# Patient Record
Sex: MALE | Race: BLACK OR AFRICAN AMERICAN | NOT HISPANIC OR LATINO | Employment: UNEMPLOYED | URBAN - METROPOLITAN AREA
[De-identification: names, ages, dates, MRNs, and addresses within clinical notes are randomized per-mention and may not be internally consistent; named-entity substitution may affect disease eponyms.]

---

## 2018-01-15 ENCOUNTER — HISTORICAL (OUTPATIENT)
Dept: ADMINISTRATIVE | Facility: HOSPITAL | Age: 38
End: 2018-01-15

## 2018-01-30 ENCOUNTER — HISTORICAL (OUTPATIENT)
Dept: RADIOLOGY | Facility: HOSPITAL | Age: 38
End: 2018-01-30

## 2018-05-04 ENCOUNTER — HISTORICAL (OUTPATIENT)
Dept: RADIOLOGY | Facility: HOSPITAL | Age: 38
End: 2018-05-04

## 2018-05-04 LAB — CORTIS SERPL-SCNC: 19 MCG/DL

## 2018-06-04 ENCOUNTER — HISTORICAL (OUTPATIENT)
Dept: RADIOLOGY | Facility: HOSPITAL | Age: 38
End: 2018-06-04

## 2018-06-18 ENCOUNTER — HISTORICAL (OUTPATIENT)
Dept: LAB | Facility: HOSPITAL | Age: 38
End: 2018-06-18

## 2018-06-18 LAB
BUN SERPL-MCNC: 9.6 MG/DL (ref 7–18)
CALCIUM SERPL-MCNC: 9.2 MG/DL (ref 8.5–10.1)
CHLORIDE SERPL-SCNC: 105 MMOL/L (ref 98–107)
CO2 SERPL-SCNC: 32.9 MMOL/L (ref 21–32)
CREAT SERPL-MCNC: 0.94 MG/DL (ref 0.6–1.3)
CREAT/UREA NIT SERPL: 10
GLUCOSE SERPL-MCNC: 73 MG/DL (ref 74–106)
POTASSIUM SERPL-SCNC: 4.2 MMOL/L (ref 3.5–5.1)
SODIUM SERPL-SCNC: 144 MMOL/L (ref 136–145)

## 2019-03-25 ENCOUNTER — HISTORICAL (OUTPATIENT)
Dept: LAB | Facility: HOSPITAL | Age: 39
End: 2019-03-25

## 2019-03-25 LAB
ABS NEUT (OLG): 4.48 X10(3)/MCL (ref 2.1–9.2)
ALBUMIN SERPL-MCNC: 4.4 GM/DL (ref 3.4–5)
ALBUMIN/GLOB SERPL: 1.3 RATIO (ref 1.1–2)
ALP SERPL-CCNC: 68 UNIT/L (ref 46–116)
ALT SERPL-CCNC: 31 UNIT/L (ref 12–78)
AST SERPL-CCNC: 11 UNIT/L (ref 15–37)
BASOPHILS # BLD AUTO: 0 X10(3)/MCL (ref 0–0.2)
BASOPHILS NFR BLD AUTO: 0 %
BILIRUB SERPL-MCNC: 0.8 MG/DL (ref 0.2–1)
BILIRUBIN DIRECT+TOT PNL SERPL-MCNC: 0.19 MG/DL (ref 0–0.2)
BILIRUBIN DIRECT+TOT PNL SERPL-MCNC: 0.61 MG/DL (ref 0–0.8)
BUN SERPL-MCNC: 22 MG/DL (ref 7–18)
CALCIUM SERPL-MCNC: 9.3 MG/DL (ref 8.5–10.1)
CHLORIDE SERPL-SCNC: 100 MMOL/L (ref 98–107)
CHOLEST SERPL-MCNC: 211 MG/DL (ref 0–200)
CHOLEST/HDLC SERPL: 5.1 {RATIO} (ref 0–5)
CO2 SERPL-SCNC: 32.1 MMOL/L (ref 21–32)
CREAT SERPL-MCNC: 0.94 MG/DL (ref 0.6–1.3)
EOSINOPHIL # BLD AUTO: 1 X10(3)/MCL (ref 0–0.9)
EOSINOPHIL NFR BLD AUTO: 11 %
ERYTHROCYTE [DISTWIDTH] IN BLOOD BY AUTOMATED COUNT: 12.8 % (ref 11.5–17)
GLOBULIN SER-MCNC: 3.3 GM/DL (ref 2.4–3.5)
GLUCOSE SERPL-MCNC: 105 MG/DL (ref 74–106)
HCT VFR BLD AUTO: 44.7 % (ref 42–52)
HDLC SERPL-MCNC: 41 MG/DL (ref 40–60)
HGB BLD-MCNC: 15.1 GM/DL (ref 14–18)
IMM GRANULOCYTES # BLD AUTO: 0.01 % (ref 0–0.02)
IMM GRANULOCYTES NFR BLD AUTO: 0.1 % (ref 0–0.43)
LDLC SERPL CALC-MCNC: 148 MG/DL (ref 0–129)
LYMPHOCYTES # BLD AUTO: 2.6 X10(3)/MCL (ref 0.6–4.6)
LYMPHOCYTES NFR BLD AUTO: 30 %
MCH RBC QN AUTO: 28.9 PG (ref 27–31)
MCHC RBC AUTO-ENTMCNC: 33.8 GM/DL (ref 33–36)
MCV RBC AUTO: 85.5 FL (ref 80–94)
MONOCYTES # BLD AUTO: 0.5 X10(3)/MCL (ref 0.1–1.3)
MONOCYTES NFR BLD AUTO: 6 %
NEUTROPHILS # BLD AUTO: 4.48 X10(3)/MCL (ref 1.4–7.9)
NEUTROPHILS NFR BLD AUTO: 52 %
PLATELET # BLD AUTO: 230 X10(3)/MCL (ref 130–400)
PMV BLD AUTO: 11.8 FL (ref 9.4–12.4)
POTASSIUM SERPL-SCNC: 4 MMOL/L (ref 3.5–5.1)
PROT SERPL-MCNC: 7.7 GM/DL (ref 6.4–8.2)
RBC # BLD AUTO: 5.23 X10(6)/MCL (ref 4.7–6.1)
SODIUM SERPL-SCNC: 139 MMOL/L (ref 136–145)
TRIGL SERPL-MCNC: 108 MG/DL
VLDLC SERPL CALC-MCNC: 22 MG/DL
WBC # SPEC AUTO: 8.6 X10(3)/MCL (ref 4.5–11.5)

## 2021-06-23 ENCOUNTER — HISTORICAL (OUTPATIENT)
Dept: ENDOSCOPY | Facility: HOSPITAL | Age: 41
End: 2021-06-23

## 2021-06-28 ENCOUNTER — HISTORICAL (OUTPATIENT)
Dept: ADMINISTRATIVE | Facility: HOSPITAL | Age: 41
End: 2021-06-28

## 2021-06-28 LAB
ABS NEUT (OLG): 4.97 X10(3)/MCL (ref 2.1–9.2)
BASOPHILS # BLD AUTO: 0 X10(3)/MCL (ref 0–0.2)
BASOPHILS NFR BLD AUTO: 0 %
EOSINOPHIL # BLD AUTO: 0.6 X10(3)/MCL (ref 0–0.9)
EOSINOPHIL NFR BLD AUTO: 8 %
ERYTHROCYTE [DISTWIDTH] IN BLOOD BY AUTOMATED COUNT: 14.1 % (ref 11.5–14.5)
FERRITIN SERPL-MCNC: 10.26 NG/ML (ref 21.81–274.66)
HCT VFR BLD AUTO: 35.6 % (ref 40–51)
HGB BLD-MCNC: 11.3 GM/DL (ref 13.5–17.5)
IMM GRANULOCYTES # BLD AUTO: 0.02 10*3/UL
IMM GRANULOCYTES NFR BLD AUTO: 0 %
IRON SATN MFR SERPL: 6 % (ref 20–50)
IRON SERPL-MCNC: 21 UG/DL (ref 65–175)
LYMPHOCYTES # BLD AUTO: 2.4 X10(3)/MCL (ref 0.6–4.6)
LYMPHOCYTES NFR BLD AUTO: 29 %
MCH RBC QN AUTO: 28 PG (ref 26–34)
MCHC RBC AUTO-ENTMCNC: 31.7 GM/DL (ref 31–37)
MCV RBC AUTO: 88.3 FL (ref 80–100)
MONOCYTES # BLD AUTO: 0.5 X10(3)/MCL (ref 0.1–1.3)
MONOCYTES NFR BLD AUTO: 5 %
NEUTROPHILS # BLD AUTO: 4.97 X10(3)/MCL (ref 2.1–9.2)
NEUTROPHILS NFR BLD AUTO: 58 %
NRBC BLD AUTO-RTO: 0 % (ref 0–0.2)
PLATELET # BLD AUTO: 348 X10(3)/MCL (ref 130–400)
PMV BLD AUTO: 11.7 FL (ref 7.4–10.4)
RBC # BLD AUTO: 4.03 X10(6)/MCL (ref 4.5–5.9)
TIBC SERPL-MCNC: 339 UG/DL (ref 69–240)
TIBC SERPL-MCNC: 360 UG/DL (ref 250–450)
TRANSFERRIN SERPL-MCNC: 319 MG/DL (ref 174–364)
WBC # SPEC AUTO: 8.6 X10(3)/MCL (ref 4.5–11)

## 2022-01-19 ENCOUNTER — PATIENT OUTREACH (OUTPATIENT)
Dept: EMERGENCY MEDICINE | Facility: HOSPITAL | Age: 42
End: 2022-01-19

## 2022-02-18 ENCOUNTER — PATIENT OUTREACH (OUTPATIENT)
Dept: EMERGENCY MEDICINE | Facility: HOSPITAL | Age: 42
End: 2022-02-18

## 2022-03-30 ENCOUNTER — HISTORICAL (OUTPATIENT)
Dept: ENDOSCOPY | Facility: HOSPITAL | Age: 42
End: 2022-03-30

## 2022-03-30 LAB — CRC RECOMMENDATION EXT: NORMAL

## 2022-04-10 ENCOUNTER — HISTORICAL (OUTPATIENT)
Dept: ADMINISTRATIVE | Facility: HOSPITAL | Age: 42
End: 2022-04-10
Payer: MEDICAID

## 2022-04-26 VITALS
SYSTOLIC BLOOD PRESSURE: 156 MMHG | BODY MASS INDEX: 28.93 KG/M2 | DIASTOLIC BLOOD PRESSURE: 98 MMHG | WEIGHT: 195.31 LBS | HEIGHT: 69 IN

## 2022-05-12 ENCOUNTER — HOSPITAL ENCOUNTER (EMERGENCY)
Facility: HOSPITAL | Age: 42
Discharge: HOME | End: 2022-05-13
Attending: FAMILY MEDICINE
Payer: MEDICAID

## 2022-05-12 VITALS
SYSTOLIC BLOOD PRESSURE: 152 MMHG | WEIGHT: 194 LBS | DIASTOLIC BLOOD PRESSURE: 116 MMHG | OXYGEN SATURATION: 97 % | RESPIRATION RATE: 18 BRPM | TEMPERATURE: 98 F | HEART RATE: 67 BPM | BODY MASS INDEX: 28.73 KG/M2 | HEIGHT: 69 IN

## 2022-05-12 DIAGNOSIS — R07.9 CHEST PAIN: ICD-10-CM

## 2022-05-12 DIAGNOSIS — I10 HYPERTENSION: ICD-10-CM

## 2022-05-12 DIAGNOSIS — R42 VERTIGO: Primary | ICD-10-CM

## 2022-05-12 LAB
ALBUMIN SERPL-MCNC: 4.2 GM/DL (ref 3.5–5)
ALBUMIN/GLOB SERPL: 1.2 RATIO (ref 1.1–2)
ALP SERPL-CCNC: 76 UNIT/L (ref 40–150)
ALT SERPL-CCNC: 30 UNIT/L (ref 0–55)
AST SERPL-CCNC: 21 UNIT/L (ref 5–34)
BASOPHILS # BLD AUTO: 0.06 X10(3)/MCL (ref 0–0.2)
BASOPHILS NFR BLD AUTO: 0.5 %
BILIRUBIN DIRECT+TOT PNL SERPL-MCNC: 0.3 MG/DL
BUN SERPL-MCNC: 15.6 MG/DL (ref 8.9–20.6)
CALCIUM SERPL-MCNC: 9.5 MG/DL (ref 8.4–10.2)
CHLORIDE SERPL-SCNC: 103 MMOL/L (ref 98–107)
CO2 SERPL-SCNC: 27 MMOL/L (ref 22–29)
CREAT SERPL-MCNC: 1.06 MG/DL (ref 0.73–1.18)
EOSINOPHIL # BLD AUTO: 0.76 X10(3)/MCL (ref 0–0.9)
EOSINOPHIL NFR BLD AUTO: 6.8 %
ERYTHROCYTE [DISTWIDTH] IN BLOOD BY AUTOMATED COUNT: 13.5 % (ref 11.5–17)
GLOBULIN SER-MCNC: 3.5 GM/DL (ref 2.4–3.5)
GLUCOSE SERPL-MCNC: 93 MG/DL (ref 74–100)
HCT VFR BLD AUTO: 42.6 % (ref 42–52)
HGB BLD-MCNC: 14 GM/DL (ref 14–18)
IMM GRANULOCYTES # BLD AUTO: 0.03 X10(3)/MCL (ref 0–0.02)
IMM GRANULOCYTES NFR BLD AUTO: 0.3 % (ref 0–0.43)
LYMPHOCYTES # BLD AUTO: 3.54 X10(3)/MCL (ref 0.6–4.6)
LYMPHOCYTES NFR BLD AUTO: 31.5 %
MCH RBC QN AUTO: 27.5 PG (ref 27–31)
MCHC RBC AUTO-ENTMCNC: 32.9 MG/DL (ref 33–36)
MCV RBC AUTO: 83.7 FL (ref 80–94)
MONOCYTES # BLD AUTO: 0.52 X10(3)/MCL (ref 0.1–1.3)
MONOCYTES NFR BLD AUTO: 4.6 %
NEUTROPHILS # BLD AUTO: 6.3 X10(3)/MCL (ref 2.1–9.2)
NEUTROPHILS NFR BLD AUTO: 56.3 %
NRBC BLD AUTO-RTO: 0 %
PLATELET # BLD AUTO: 293 X10(3)/MCL (ref 130–400)
PMV BLD AUTO: 12.1 FL (ref 9.4–12.4)
POTASSIUM SERPL-SCNC: 3.9 MMOL/L (ref 3.5–5.1)
PROT SERPL-MCNC: 7.7 GM/DL (ref 6.4–8.3)
RBC # BLD AUTO: 5.09 X10(6)/MCL (ref 4.7–6.1)
SODIUM SERPL-SCNC: 136 MMOL/L (ref 136–145)
TROPONIN I SERPL-MCNC: 0 NG/ML (ref 0–0.04)
WBC # SPEC AUTO: 11.2 X10(3)/MCL (ref 4.5–11.5)

## 2022-05-12 PROCEDURE — 99285 EMERGENCY DEPT VISIT HI MDM: CPT | Mod: 25

## 2022-05-12 PROCEDURE — 36415 COLL VENOUS BLD VENIPUNCTURE: CPT | Performed by: FAMILY MEDICINE

## 2022-05-12 PROCEDURE — 85025 COMPLETE CBC W/AUTO DIFF WBC: CPT | Performed by: FAMILY MEDICINE

## 2022-05-12 PROCEDURE — 93005 ELECTROCARDIOGRAM TRACING: CPT

## 2022-05-12 PROCEDURE — 84484 ASSAY OF TROPONIN QUANT: CPT | Performed by: FAMILY MEDICINE

## 2022-05-12 PROCEDURE — 25000003 PHARM REV CODE 250: Performed by: FAMILY MEDICINE

## 2022-05-12 PROCEDURE — 25500020 PHARM REV CODE 255: Performed by: FAMILY MEDICINE

## 2022-05-12 PROCEDURE — 80053 COMPREHEN METABOLIC PANEL: CPT | Performed by: FAMILY MEDICINE

## 2022-05-12 RX ORDER — PANTOPRAZOLE SODIUM 40 MG/1
40 TABLET, DELAYED RELEASE ORAL DAILY
COMMUNITY
Start: 2022-01-26 | End: 2023-01-03 | Stop reason: SDUPTHER

## 2022-05-12 RX ORDER — MECLIZINE HYDROCHLORIDE 25 MG/1
25 TABLET ORAL 3 TIMES DAILY PRN
Qty: 20 TABLET | Refills: 0 | Status: SHIPPED | OUTPATIENT
Start: 2022-05-12 | End: 2023-01-27 | Stop reason: ALTCHOICE

## 2022-05-12 RX ORDER — LISINOPRIL AND HYDROCHLOROTHIAZIDE 12.5; 2 MG/1; MG/1
1 TABLET ORAL DAILY
COMMUNITY
Start: 2022-01-31 | End: 2023-01-27 | Stop reason: ALTCHOICE

## 2022-05-12 RX ORDER — MECLIZINE HYDROCHLORIDE 25 MG/1
25 TABLET ORAL
Status: COMPLETED | OUTPATIENT
Start: 2022-05-12 | End: 2022-05-12

## 2022-05-12 RX ADMIN — IOPAMIDOL 100 ML: 755 INJECTION, SOLUTION INTRAVENOUS at 09:05

## 2022-05-12 RX ADMIN — MECLIZINE HYDROCHLORIDE 25 MG: 25 TABLET ORAL at 11:05

## 2022-05-13 NOTE — DISCHARGE INSTRUCTIONS
You came into the emergency department for vertigo and lightheadedness.  Your EKG, labs, chest x-ray were all normal.  We did a CT scan and a CT angio of your head and neck both of which were normal.  You will be discharged with a prescription call meclizine.    Follow-up with your primary care doctor for re-evaluation.    Return to the emergency department if you have double vision, constant ringing in the ears, numbness/tingling/weakness in your arms or legs, not able to walk like normal.

## 2022-05-13 NOTE — ED PROVIDER NOTES
Kidney Name: Fredi Ricci   Age: 42 y.o.  Sex: male    Chief complaint:   Chief Complaint   Patient presents with    Hypertension    Headache     Since Sunday, pt's blood pressure has been elevated with dizziness, headache, n/v. Current bp 192/123.       Patient arrived with: Private  History obtained from: Patient    Subjective:   42-year-old male with a history of hypertension that presents to the emergency department with multiple complaints.  Patient said his symptoms started approximately 5 days ago.  He says he has intermittent lightheadedness and vertigo like sensations where the room is spinning around him.  When he stands up he occasionally feels a loss of balance.  None of this has been normal form.  He also had some intermittent tingling in bilateral fingers.  Patient denies any falls or trauma.  Denies headache.  Denies blurry vision, double vision, tinnitus, numbness/tingling in extremities otherwise.  Denies fever, chills, sweats, cough, sore throat, runny nose, chest pain, shortness of breath, abdominal pain, nausea, vomiting, diarrhea, dysuria, hematuria.    Past Medical History:   Diagnosis Date    Hypertension      History reviewed. No pertinent surgical history.  Social History     Socioeconomic History    Marital status: Single     Review of patient's allergies indicates:  No Known Allergies     Review of Systems   Constitutional: Negative for diaphoresis and fever.   HENT: Negative for congestion and sore throat.    Eyes: Negative for pain and discharge.   Respiratory: Negative for cough and shortness of breath.    Cardiovascular: Negative for chest pain and palpitations.   Gastrointestinal: Negative for diarrhea and vomiting.   Genitourinary: Negative for dysuria and hematuria.   Musculoskeletal: Negative for back pain and myalgias.   Skin: Negative for itching and rash.   Neurological: Positive for dizziness. Negative for weakness and headaches.          Objective:     Vitals:     05/12/22 2230   BP: (!) 167/113   Pulse: 66   Resp:    Temp:         Physical Exam  Constitutional:       Appearance: He is not toxic-appearing.   HENT:      Head: Normocephalic and atraumatic.   Cardiovascular:      Rate and Rhythm: Regular rhythm.      Pulses: Normal pulses.   Pulmonary:      Effort: Pulmonary effort is normal.      Breath sounds: Normal breath sounds.   Abdominal:      General: Abdomen is flat. Bowel sounds are normal.      Palpations: Abdomen is soft.   Musculoskeletal:         General: No deformity. Normal range of motion.      Cervical back: Normal range of motion and neck supple.   Skin:     General: Skin is warm and dry.   Neurological:      General: No focal deficit present.      Mental Status: He is alert and oriented to person, place, and time.      Comments: Mental status: Alert, oriented x4 (person, place, time, situation). Normal speech and comprehension  Cranial nerves:  II, III - Pupils equal and reactive to light. Normal accommodation  III, IV, VI - EOMI  V - Normal facial sensation bilaterally  VII - Normal facial movement bilaterally in the upper and lower face   VIII - Normal hearing bilaterally  IX, X - Normal palate movement with no uvula deviation  XI - Normal strength while shrugging shoulders  XII - No tongue deviation  Motor: Normal motor and strength in the upper and lower ext bilaterally. Normal tone. No pronator drift.  Sensation: Normal sensation in upper and lower ext bilaterally  Coordination: Normal finger-nose-finger, rapid finger tapping bilaterally, and heel-shin bilaterally  Gait: Normal gait   Psychiatric:         Mood and Affect: Mood normal.         Behavior: Behavior normal.          Records:  Nursing records and triage records reviewed  Prior records reviewed    Labs:  Recent Results (from the past 24 hour(s))   Comprehensive Metabolic Panel    Collection Time: 05/12/22  7:00 PM   Result Value Ref Range    Sodium Level 136 136 - 145 mmol/L    Potassium Level  3.9 3.5 - 5.1 mmol/L    Chloride 103 98 - 107 mmol/L    Carbon Dioxide 27 22 - 29 mmol/L    Glucose Level 93 74 - 100 mg/dL    Blood Urea Nitrogen 15.6 8.9 - 20.6 mg/dL    Creatinine 1.06 0.73 - 1.18 mg/dL    Calcium Level Total 9.5 8.4 - 10.2 mg/dL    Protein Total 7.7 6.4 - 8.3 gm/dL    Albumin Level 4.2 3.5 - 5.0 gm/dL    Globulin 3.5 2.4 - 3.5 gm/dL    Albumin/Globulin Ratio 1.2 1.1 - 2.0 ratio    Bilirubin Total 0.3 <=1.5 mg/dL    Alkaline Phosphatase 76 40 - 150 unit/L    Alanine Aminotransferase 30 0 - 55 unit/L    Aspartate Aminotransferase 21 5 - 34 unit/L    Estimated GFR- >60 mls/min/1.73/m2   Troponin I    Collection Time: 05/12/22  7:00 PM   Result Value Ref Range    Troponin-I 0.003 0.000 - 0.045 ng/mL   CBC with Differential    Collection Time: 05/12/22  7:00 PM   Result Value Ref Range    WBC 11.2 4.5 - 11.5 x10(3)/mcL    RBC 5.09 4.70 - 6.10 x10(6)/mcL    Hgb 14.0 14.0 - 18.0 gm/dL    Hct 42.6 42.0 - 52.0 %    MCV 83.7 80.0 - 94.0 fL    MCH 27.5 27.0 - 31.0 pg    MCHC 32.9 (L) 33.0 - 36.0 mg/dL    RDW 13.5 11.5 - 17.0 %    Platelet 293 130 - 400 x10(3)/mcL    MPV 12.1 9.4 - 12.4 fL    Neut % 56.3 %    Lymph % 31.5 %    Mono Auto 4.6 %    Eos Auto 6.8 %    Basophil Auto 0.5 %    Lymph # 3.54 0.6 - 4.6 x10(3)/mcL    Neut # 6.3 2.1 - 9.2 x10(3)/mcL    Abs Mono 0.52 0.1 - 1.3 x10(3)/mcL    Abs Eos 0.76 0 - 0.9 x10(3)/mcL    Abs Baso 0.06 0 - 0.2 x10(3)/mcL    IG# 0.03 (H) 0 - 0.0155 x10(3)/mcL    IG% 0.3 0 - 0.43 %    NRBC% 0.0 %        Images:  CT Head Without Contrast    Result Date: 5/12/2022  Technique:CT of the head was performed without intravenous contrast with axial as well as coronal and sagittal images. Comparison:Comparison is with study dated ?2016-11-11 10:07:10?. Dosage Information:Automated exposure control was utilized.  DLP 9 Clinical history:Hypertension, headache. Findings: Hemorrhage:No acute intracranial hemorrhage is seen. CSF spaces:The ventricles sulci and basal  cisterns are within normal limits. Brain parenchyma:Unremarkable with preservation of the grey white junction throughout. Cerebellum:Unremarkable. Calvarium:No acute linear or depressed skull fracture is seen. Paranasal sinuses:The visualized paranasal sinuses appear clear with no mucoperiosteal thickening or air fluid levels identified.     Impression: No acute intracranial process identified. Details and other findings as noted above. No significant discrepancy with overnight report. Electronically signed by: Nishant Osullivan Date:    05/12/2022 Time:    20:33     Imaging Results          CTA Head and Neck (xpd) (Preliminary result)  Result time 05/12/22 21:41:20    Preliminary result by Seth Perez MD (05/12/22 21:41:20)                 Narrative:    START OF REPORT:  Technique: CT angiogram of the intracranial vessels was performed without and with intravenous contrast with direct axial as well as sagittal and coronal reformations. CT angiogram of the neck vessels was performed without and with intravenous contrast with direct axial as well as sagittal and coronal reformations.    Comparison: Comparison is with study dated2022-05-12 20:10:25.    Clinical history: Hypertension Headache (Since Sunday, pt's blood pressure has been elevated with dizziness, headache, n/v. Current bp 192/123. ).    Findings:  Intracranial Vascular structures:  Internal carotid arteries: Unremarkable.  Middle cerebral arteries: Unremarkable.  Anterior cerebral arteries: Mild hypoplasia of A1 segment of the right anterior cerebral artery is seen. The left anterior cerebral artery appears unremarkable.  Vertebral arteries: Left vertebral artery is dominant.  Basilar artery: Unremarkable.  Posterior cerebral arteries: Fetal origin of the right posterior cerebral artery is seen with hypoplastic P1 segment. The left posterior cerebral artery appears unremarkable.  Neck Vascular structures: The visualized aorta and origin of the great  vessels of the neck appear unremarkable.  Carotids:  Common carotid arteries: The right and the left common carotid arteries appear unremarkable.  Internal carotid artery: The right and the left internal carotid arteries appear unremarkable.  Vertebral arteries: Left vertebral artery is dominant. The right vertebral artery is patent.  Brain parenchyma: No abnormal enhancement is seen.      Impression:  1. Unremarkable CT angiogram of the head and neck. No acute intracranial process identified. Details and other findings as described above.                                 CT Head Without Contrast (Final result)  Result time 05/12/22 20:33:32    Final result by Nishant Osullivan MD (05/12/22 20:33:32)                 Impression:    Impression:    No acute intracranial process identified. Details and other findings as noted above.    No significant discrepancy with overnight report.      Electronically signed by: Nishant Osullivan  Date:    05/12/2022  Time:    20:33             Narrative:      Technique:CT of the head was performed without intravenous contrast with axial as well as coronal and sagittal images.    Comparison:Comparison is with study dated ?2016-11-11 10:07:10?.    Dosage Information:Automated exposure control was utilized.  DLP 9    Clinical history:Hypertension, headache.    Findings:    Hemorrhage:No acute intracranial hemorrhage is seen.    CSF spaces:The ventricles sulci and basal cisterns are within normal limits.    Brain parenchyma:Unremarkable with preservation of the grey white junction throughout.    Cerebellum:Unremarkable.    Calvarium:No acute linear or depressed skull fracture is seen.    Paranasal sinuses:The visualized paranasal sinuses appear clear with no mucoperiosteal thickening or air fluid levels identified.                    Preliminary result by Nishant Osullivan MD (05/12/22 20:12:05)                 Narrative:    START OF REPORT:  Technique: CT of the head was performed without  intravenous contrast with axial as well as coronal and sagittal images.    Comparison: Comparison is with study dated â2016-11-11 10:07:10â.    Dosage Information: Automated exposure control was utilized.    Clinical history: Hypertension, headache.    Findings:  Hemorrhage: No acute intracranial hemorrhage is seen.  CSF spaces: The ventricles sulci and basal cisterns are within normal limits.  Brain parenchyma: Unremarkable with preservation of the grey white junction throughout.  Cerebellum: Unremarkable.  Sella and skull base: The sella appears to be within normal limits for age.  Cerebellopontine angles: Within normal limits.  Herniation: None.  Intracranial calcifications: Incidental note is made of bilateral choroid plexus calcification. Incidental note is made of some pineal region calcification.  Calvarium: No acute linear or depressed skull fracture is seen.    Maxillofacial Structures:  Paranasal sinuses: The visualized paranasal sinuses appear clear with no mucoperiosteal thickening or air fluid levels identified.  Orbits: The orbits appear unremarkable.  Zygomatic arches: The zygomatic arches are intact and unremarkable.  Temporal bones and mastoids: The temporal bones and mastoids appear unremarkable.  TMJ: The mandibular condyles appear normally placed with respect to the mandibular fossa.      Impression:  1. No acute intracranial process identified. Details and other findings as noted above.                                 X-Ray Chest PA And Lateral (Preliminary result)  Result time 05/12/22 22:09:07    ED Interpretation by Evaristo Tobias MD (05/12/22 22:09:07, [facility], Emergency Medicine)    No cardiomegaly, pneumothorax, consolidations                                 Medical decision making:   Presents to the emergency department for vertigo, presyncopal episodes, loss of balance.  Patient is stable and nontoxic appearing.  Afebrile, not tachycardic,  hypertensive 175/111, saturating well on room air.  On physical exam patient has no neurological deficits.  Will get cardiac labs and CT head for further evaluation.  Patient will get meclizine seen for symptomatic management.    ED Course as of 05/12/22 2310   Thu May 12, 2022   2208 My interpretation of EKG and labs.  EKG is nonischemic.  Troponin negative.  No leukocytosis, anemia, thrombocytopenia.  No severe electrolyte abnormality.  No GALA hyperglycemia.  Liver enzymes within normal limits. [RK]   2209 Chest x-ray within normal limits.  CT head within normal limits.    Because patient had concerning history vertigo, presyncopal episodes, loss of balance will get a CTA of the head and neck for further evaluation.  Patient is okay with this plan. [RK]   2308 CTA shows no signs of dissection or any other acute abnormalities.  On re-evaluation patient said his symptoms her currently resolved, unknown of meclizine helped.    Patient will be okay for discharge and follow up with his primary care doctor for re-evaluation.  Will get a prescription for meclizine.  We discussed return precautions and a list of the discharge instructions.  Patient understands the plan, no further questions, felt safe for discharge. [RK]      ED Course User Index  [RK] Evaristo Tobias MD          EKG:  ECG Results          EKG 12-lead (Preliminary result)  Result time 05/12/22 22:08:24    ED Interpretation by Evaristo Tobias MD (05/12/22 22:08:24, [facility], Emergency Medicine)    EKG normal sinus rhythm at a rate of 65, no signs of ST elevation or depression, normal axis, normal intervals with a QTC of 422                               Procedures       Diagnosis:  Final diagnoses:  [R07.9] Chest pain  [I10] Hypertension  [R42] Vertigo (Primary)       Evaristo Tobias M.D.  Emergency Medicine Physician     (Please note that this chart was completed via voice to text dictation. There may be typographical  errors or substitutions that are unintentional, or uncorrected. Every attempt was made to proofread the chart prior to completion. If there are any questions, please contact the physician for final clarification).       Evaristo Tobias MD  05/12/22 7226       Evaristo Tobias MD  05/12/22 6032

## 2022-05-21 NOTE — HISTORICAL [facility] CERNER
This is a historical note converted from [facility]. Formatting and pictures may have been removed.  Please reference Elijah for original formatting and attached multimedia. History of Present Illness  Mr. Ricci is a 42 year old AAM with migraines, GERD here for an EGD and colonoscopy.  ?   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 ?He was taking 2-3 BC powders daily at that time for migraine headaches. ?He did not require blood transfusion during hospitalization.??He underwent EGD with Dr. Ashley Cooper that showed 8 mm clean based gastric ulcer, HP negative on biopsies.?He was started on Protonix 40 mg twice daily?and advised?avoidance of NSAIDs.??His hemoglobin was 10.5g/dL on discharge.  ?   He was started on iron supplementation due to persistent MALLORIE.? Most recent hemoglobin 12.9 g/dL, iron 44, iron sat 13, ferritin 37 in January 2022  ?   He continues to take Protonix?40 mg?twice daily.? He takes ferrous sulfate daily.?? His appetite is good and his weight is stable. ?He denies fever, chills, nausea, vomiting, hematemesis, odynophagia, dysphagia, acid reflux, heartburn,?or early satiety. ?He has 2 bowel movements daily without melena, hematochezia, fecal urgency, fecal incontinence, or pain with defecation. ?He continues to take BC powder although infrequently for migraine headaches.  ?   He takes aspirin 81 mg daily.? He denies tobacco use and drinks 1 beer?every other day. ?He denies illicit drug use. ?He states his father and grandfather had colon cancer.? His father was in his 50s?when he was diagnosed.  Review of Systems  Comprehensive Review of Systems performed with no exceptions other than as noted in HPI.  ?  Physical Exam  Vitals & Measurements  T:?36.6? ?C (Oral)? HR:?71(Monitored)? RR:?18? BP:?146/95? SpO2:?98%? WT:?84.6?kg? BMI:?27.43?  General:?well-developed well-nourished in no acute distress  Eye: PERRLA, EOMI, clear  conjunctiva  HENT:? oropharynx without erythema/exudate, oropharynx and nasal mucosal surfaces moist  Neck:? no thyromegaly or lymphadenopathy, trachea midline  Respiratory:?symmetrical chest expansion and respiratory effort, clear to auscultation bilaterally  Cardiovascular:?regular rate and rhythm without murmurs, gallops or rubs  Gastrointestinal:?soft, non-tender, non-distended with normal bowel sounds, without masses to palpation  Integumentary: no rashes or skin lesions present  Neurologic: cranial nerves intact, no asterixis, awake, alert, and oriented  Psych: good insight, appropriate mood, normal affect  ?  Assessment/Plan  Mr. Ricci is a 42 year old AAM with migraines, GERD here for an EGD and colonoscopy.  ?  Risks, benefits, and alternatives of the procedure discussed.?  Will proceed with endoscopic procedure as scheduled.   Problem List/Past Medical History  Ongoing  Elbow pain, right  Hypertension  Obesity  Historical  No qualifying data  Procedure/Surgical History  Biopsy Gastrointestinal (06/23/2021)  Esophagogastroduodenoscopy (06/23/2021)  Esophagogastroduodenoscopy, flexible, transoral; with biopsy, single or multiple (06/23/2021)  Excision of Stomach, Via Natural or Artificial Opening Endoscopic, Diagnostic (06/23/2021)  Inspection of Upper Intestinal Tract, Via Natural or Artificial Opening Endoscopic (06/23/2021)  Simple repair of superficial wounds of face, ears, eyelids, nose, lips and/or mucous membranes; 2.6 cm to 5.0 cm. (08/26/2014)  Suture of laceration of lip (08/26/2014)  Tonsillectomy (1994)   Medications  Inpatient  buffered lidocaine 2% - 0.5 ml syringe, 10 mg= 0.5 mL, Subcutaneous, As Directed  IVF Lactated Ringers LR Infusion 1,000 mL, 1000 mL, IV  Home  aspirin 81 mg oral tablet, 81 mg= 1 tab(s), Oral, Daily  ferrous sulfate 325 mg (65 mg elemental iron) oral delayed release tablet, 325 mg= 1 tab(s), Oral, Daily, 5 refills  hydrochlorothiazide-lisinopril 12.5 mg-20 mg oral  tablet, 1 tab(s), Oral, Daily  ketorolac 10 mg oral tablet, 10 mg= 1 tab(s), Oral, q6hr  Protonix 40 mg ORAL enteric coated tablet, 40 mg= 1 tab(s), Oral, BID, 5 refills  SUMAtriptan 50 mg oral tablet, 50 mg= 1 tab(s), Oral, Once  Topamax 25 mg oral tablet, 25 mg= 1 tab(s), Oral, Daily  Allergies  No Known Allergies  Social History  Abuse/Neglect  No, 03/30/2022  Alcohol - Denies Alcohol Use, 12/05/2013  Past, Beer, 06/28/2021  Daily, 06/10/2019  Current, Beer, 1-2 times per month, 10/05/2017  Substance Use - Denies Substance Abuse, 12/05/2013  Never, 10/05/2017  Tobacco - Denies Tobacco Use, 12/05/2013  Never (less than 100 in lifetime), N/A, 03/30/2022  Immunizations  Vaccine Date Status Comments   tetanus/diphtheria/pertussis, acel(Tdap) 08/26/2014 Given other (see comment)

## 2022-07-27 ENCOUNTER — PATIENT OUTREACH (OUTPATIENT)
Dept: EMERGENCY MEDICINE | Facility: HOSPITAL | Age: 42
End: 2022-07-27
Payer: MEDICAID

## 2022-07-27 NOTE — PROGRESS NOTES
Original encounter date 1/19/22 in [facility] EMR.  Information placed in Epic for continuity purposes.  HealthE Care Entered On:  1/19/2022 9:12 CST    Performed On:  1/19/2022 8:52 CST by Samantha Amaral LPN               Discharge Past 30 Days   Visit to the Hospital in the Last 30 Days :   Emergency department (ED) visit   Reason for Choosing ED for Care :   Convenience   Perception of Change in Health Status DC :   Improving   Discharged To :   Home independently   DC Instructions :   Received discharge instructions , Understands discharge instructions    Education Provided :   ED utilization, Importance of keeping appointments, Community resources, Medication Compliance, Diet Compliance, Other: BENEFITS OF PCP, ALL PROVIDERS AND ANCILLARY CARE. ORAL CARE/DENTAL PROVIDER OPTIONS   (Comment: DASH DIET [Samantha Amaral LPN - 1/19/2022 8:52 CST] )   Discharge Past 30 Days Addntl Comments :   SPOKE TO PT FOR F/U ED VISIT, PT REPORTS HE IS FEELING BETTER AND WENT TO [facility] AND HAD TWO TEETH PULLED ON YESTERDAY, AND WENT WELL. DISCUSSED MEDICATION AND DASH DIET COMPLIANCE AND BENEFITS OF PCP AND ALL PROVIDERS AND ANCILLARY CARE AND ORAL CARE.  ADVISED PT TO CALL AND OBTAIN PCP APPT, PT REPORTS LAST SEEN PCP OVER A YEAR AGO, REPORTS HE MEDICAL CONDITION OF HIGH BLOOD PRESSURE AND MIGRAINE AND IS ON LISINOPRIL AND HCTZ AND TOPAMAX FOR MIGRAINE WHEN NEEDED. HE ALSO REPORTS THAT HE IS RUNNING LOW IN RXS AND WILL CALL TO OBTAIN PCP APPT. STRESSED IMPORTANCE OF OBTAINING F/U APPT WITH PCP, ADVISED PT OF NOTED UPCOMING SCHEDULED APPT WITH [facility] ON 1/26/22 AT 9AM AND 3/30/22 AT 2:15PM WITH [facility]. ADVISED PT TO UTILIZE [facility] FOR NON-EMERGENCY ISSUES WHEN PCP IS NOT AVAILABLE. PT REQUEST TO MAIL [facility] EDUCATION AND RESOURCE DISCUSSED TO HIM, VERIFIED PT ADDRESS WITH PT. PT CONSENT TO ENROLL IN [facility] AND CONTINUE F/U CALLS. PT VOICES UNDERSTANDING TO INSTRUCTIONS GIVEN AND  APPRECIATION.     Cristin LPSamantha AGUILAR - 1/19/2022 8:52 CST   Barriers to Care   SDoH Eat Less Than You Should :   No   SDoH Shut Off Services to Your Home :   No   SDoH No Regular Place to Live :   No   SDoH Needed Provider but Costs too Much :   No   SDoH Help Reading and Writing Paper Work :   No   SDoH Feel Lonely Often :   No   SDoH Missed Appt/Meds- No Transportation :   No   SDoH Call Dr To Be Seen Right Away :   Yes   SDoH Medical Problems Cause ED Visit :   No   SDoH Other Problems Affecting Health :   No   SDoH Reviewed :   Yes   SDoH Dentist Seen in Last Year :   Yes   (Comment: PT REPORTS WAS SEEN AND DENTAL WORK DONE ON YESTERDAY 1/18/22 AT [facility], PT REQUEST TO MAIL DENTAL PROVIDER OPTIONS THAT ACCEPT INSURANCE [Cristin HONGSamantha - 1/19/2022 8:52 CST] )   Cristin MIGUELSamantha AGUILAR - 1/19/2022 8:52 CST   Prescriptions   Medication Reconcilation Completed :   Unknown   Medication Prescriptions Filled After DC :   Confirms all medications filled , Confirms taking medications as prescribed    Questions About Meds Prescribed at DC :   Denies any questions or concerns                    Cristin HONGSamantha - 1/19/2022 8:52 CST   Appointments   Follow-Up Appt Scheduled :   No   (Comment: PT REPORTS HIS PCP IS OTILIA ORTIZ NP AND HAS NOT SEEN IN ABOUT A YEAR, STRESSED IMPORTANCE TO CALL AND OBTAIN F/U APPT WITH PCP AND BENEFITS OF PCP [Cristin HONGSamantha - 1/19/2022 8:52 CST] )   Follow-Up Appointment Status :   Has not had opportunity to call for appointment   PCP Visit Within Year :   No   Follow-Up Specialist Appt Scheduled :   No   Cristin HONGSamantha - 1/19/2022 8:52 CST   Navigation   Initial Assesment Completed By :   Phone   ED FIN :   [id number]   MCIP Navigation Call Log :   Initial MCIP contact   Referral To  Care :   MCIP Navigation   Transportation Arrangements Made :   Yes   Providers Patient Visited Last Year :    [facility]   Root Causes for High-ED Utilization :   Lack of access to care   Plan: :   Educated patient on process of establishing PCP, Educated on appropriate ED utilization, Educated on alternate means of health care; ie urgent care when PCP not available, Educated on importance of follow up care with PCP, Educated on importance of follow up preventative dental care with dentist, Other: MAIL SDOH EDUCATON/RESOURCE TO PT PER PT REQUEST.   Samantha Amaral LPN - 1/19/2022 8:52 CST     Result type: HealthE Care - Text  Result date: January 19, 2022 8:52 CST  Result status: Auth (Verified)  Result title: HealthE Care  Performed by: Samantha Amaral LPN on January 19, 2022 8:52 CST  Verified by: Samantha Amaral LPN on January 19, 2022 8:52 CST  Encounter info: [id number], [facility] COMMUNITY CARE MANAGEMENT, Utilization Coordination, 1/19/2022 -

## 2022-07-27 NOTE — PATIENT INSTRUCTIONS
* Final Report *    Education Note (Verified)  Patient Education Materials Follows:  Why Should I Have My Own Doctor or Nurse Practitioner (PCP) to Take Care of Me  What is a PCP (Primary Care Provider)?                    A primary care provider is a doctor or nurse practitioner who you can call for an appointment and will see you when you are sick.    You will also be seen at scheduled appointment times during the year to check on your diabetes, or high blood pressure, or heart disease.    Why see the same PCP (doctor/nurse practitioner)?  You can be seen faster when you are sick                                                                                                                                                                                                                                                                                                          You, the PCP (doctor/nurse practitioner) and the office staff get to know each other; you begin to trust them to care for you. You take part in your health choices.   All of you together are a team.    Your medicine is looked at every time you visit, to be sure you are taking the medicine, as the PCP (doctor/nurse practitioner) ordered.  Your PCP (doctor/nurse practitioner) and their staff help keep you healthy and out of the hospital.  They can catch sicknesses earlier by ordering tests once a year to stop or prevent the sickness from getting worse.                                                     Your PCP (doctor/nurse practitioner) can send you to providers who specialize (heart/bone/lung) if you need.  They and their office staff help keep track of your seeing other providers (doctors/nurse practitioners) and tests (CT/ MRIs/ X Rays)) taken.                                          PCPs want you to stay healthy.  Let us care for you.                                           DASH Meal Plan  (Healthy eating plan)    Why use this meal plan?     This healthy meal plan lowers blood pressure.  This meal plan lowers the chance of you getting Diabetes, heart disease and stroke.  This meal plan also helps you lose weight.    DASH balanced meal plannin or more servings of fruits and vegetables every day.  At each meal, try to fill half of your plated with fruits and vegetables                    Eat 6-8 servings of whole grains every day.  Whole grains are:  Brown rice, oatmeal, whole wheat bread, whole grain, low salt cereals, Grace bread, whole wheat flour tortillas                5 ounces of meat, chicken, or fish each day (3-ounce size of serving of meat is the same size as a deck of playing cards)  Fish like sardines, salmon and mackerel are also good for your heart.            2 servings of low-fat dairy each day (Milk, cottage cheese, yogurt)          Do Not:  Use More than 1,500 milligrams of salt a day if you have high blood pressure (2/3 teaspoon)      You would look at labels and total milligrams of salt per day    Do Not Add salt when cooking      Do Not Salt food before eating    Do Not Eat fried foods  Do Not Cook using butter, stick margarine, lard, shortening, coconut oil    Do Not Buy regular canned vegetables, pickles, or olives (too much salt; use low salt or no salt)  Do not buy premade or frozen meals    Do Not Eat fast food/buffet           Speak with your Doctor/Nurse practitioner about exercising 30 minutes a day      Modified from DASH eating plan education    Hypertension    There is no specific number that should cause you to return to the emergency room. Only come back if you start to have any symptoms. Only take your blood pressure two or three times per day. Do not keep retaking your blood pressure over and over if you get an elevated reading.    Hypertension, commonly called high blood pressure, is when the force of blood pumping through your arteries is too strong. Your arteries are the blood vessels that carry blood from  your heart throughout your body. A blood pressure reading consists of a higher number over a lower number, such as 110/72. The higher number (systolic) is the pressure inside your arteries when your heart pumps. The lower number (diastolic) is the pressure inside your arteries when your heart relaxes. Ideally you want your blood pressure below 120/80.  Hypertension forces your heart to work harder to pump blood. Your arteries may become narrow or stiff. Having untreated or uncontrolled hypertension can cause heart attack, stroke, kidney disease, and other problems.        RISK FACTORS  Some risk factors for high blood pressure are controllable. Others are not.   Risk factors you cannot control include:     Race. You may be at higher risk if you are .    Age. Risk increases with age.     Gender. Men are at higher risk than women before age 45 years. After age 65, women are at higher risk than men.  Risk factors you can control include:    Not getting enough exercise or physical activity.    Being overweight.    Getting too much fat, sugar, calories, or salt in your diet.    Drinking too much alcohol.    SIGNS AND SYMPTOMS  Hypertension does not usually cause signs or symptoms. Extremely high blood pressure (hypertensive crisis) may cause headache, anxiety, shortness of breath, and nosebleed.    DIAGNOSIS  To check if you have hypertension, your health care provider will measure your blood pressure while you are seated, with your arm held at the level of your heart. It should be measured at least twice using the same arm. Certain conditions can cause a difference in blood pressure between your right and left arms. A blood pressure reading that is higher than normal on one occasion does not mean that you need treatment. If it is not clear whether you have high blood pressure, you may be asked to return on a different day to have your blood pressure checked again. Or, you may be asked to monitor your  blood pressure at home for 1 or more weeks.    TREATMENT  Treating high blood pressure includes making lifestyle changes and possibly taking medicine. Living a healthy lifestyle can help lower high blood pressure. You may need to change some of your habits.  Lifestyle changes may include:    Following the DASH diet. This diet is high in fruits, vegetables, and whole grains. It is low in salt, red meat, and added sugars.    Keep your sodium intake below 2,300 mg per day.    Getting at least 30-45 minutes of aerobic exercise at least 4 times per week.    Losing weight if necessary.    Not smoking.    Limiting alcoholic beverages.    Learning ways to reduce stress.  Your health care provider may prescribe medicine if lifestyle changes are not enough to get your blood pressure under control, and if one of the following is true:    You are 18-59 years of age and your systolic blood pressure is above 140.    You are 60 years of age or older, and your systolic blood pressure is above 150.    Your diastolic blood pressure is above 90.    You have diabetes, and your systolic blood pressure is over 140 or your diastolic blood pressure is over 90.    You have kidney disease and your blood pressure is above 140/90.    You have heart disease and your blood pressure is above 140/90.  Your personal target blood pressure may vary depending on your medical conditions, your age, and other factors.    HOME CARE INSTRUCTIONS    Have your blood pressure rechecked as directed by your health care provider.      Take medicines only as directed by your health care provider. Follow the directions carefully. Blood pressure medicines must be taken as prescribed. The medicine does not work as well when you skip doses. Skipping doses also puts you at risk for problems.     Do not smoke.      Monitor your blood pressure at home as directed by your health care provider.     SEEK MEDICAL CARE IF:    You think you are having a reaction to medicines  taken.    You have recurrent headaches or feel dizzy.    You have swelling in your ankles.    You have trouble with your vision.    SEEK IMMEDIATE MEDICAL CARE IF:    You develop a severe headache or confusion.    You have unusual weakness, numbness, or feel faint.    You have severe chest or abdominal pain.    You vomit repeatedly.    You have trouble breathing.    MAKE SURE YOU:    Understand these instructions.     Will watch your condition.    Will get help right away if you are not doing well or get worse.  This information is not intended to replace advice given to you by your health care provider. Make sure you discuss any questions you have with your health care provider.    Document Released: 12/18/2006 Document Revised: 05/03/2016 Document Reviewed: 10/10/2014  [email or web address] Interactive Patient Education ©2016 [email or web address]                    Why is taking care of your mouth/teeth/gums important?                                                                             Your mouth is the opening to your body.  If not kept clean, it can let in sickness to the rest of your body.                                                 Oral Health Care (Dentists)  [facility], [address] [address], [phone]  [facility],             [address] [place] [address] [phone]  [facility]            [address] [place] [address], [phone]  [facility]            [address] [place] [address], [phone]  [facility]            [address] [place] [address] [phone]  [address] [address] [phone]  [facility], [facility] [facility] [address], [place], [place] [id number] [phone]  [facility], [facility]  [address] [address] [phone]  [facility]             [address] [place] [address] [phone]  [facility]; [place]: [phone]   [facility]; [place]: [phone]    [facility], [address] [facility] [address], [phone]  (Accepts LHC, HB and [employer])  HELEN RODRIGUEZ DDS;[address] [place] [address]; [phone]  (ACCEPTS LHC, HB AND AETNA)             Forms  Blood Pressure Record Sheet  To take your blood pressure, you will need a blood pressure machine. You can buy a blood pressure machine (blood pressure monitor) at your clinic, drug store, or online. When choosing one, consider:   An automatic monitor that has an arm cuff.   A cuff that wraps snugly around your upper arm. You should be able to fit only one finger between your arm and the cuff.   A device that stores blood pressure reading results.   Do not choose a monitor that measures your blood pressure from your wrist or finger.  Follow your health care provider's instructions for how to take your blood pressure. To use this form:   Get one reading in the morning (a.m.) before you take any medicines.   Get one reading in the evening (p.m.) before supper.   Take at least 2 readings with each blood pressure check. This makes sure the results are correct. Wait 1-2 minutes between measurements.   Write down the results in the spaces on this form.   Repeat this once a week, or as told by your health care provider.   Make a follow-up appointment with your health care provider to discuss the results.    Blood pressure log  Date: _______________________   a.m. _____________________(1st reading) _____________________(2nd reading)   p.m. _____________________(1st reading) _____________________(2nd reading)  Date: _______________________   a.m. _____________________(1st reading) _____________________(2nd reading)   p.m.  _____________________(1st reading) _____________________(2nd reading)  Date: _______________________   a.m. _____________________(1st reading) _____________________(2nd reading)   p.m. _____________________(1st reading) _____________________(2nd reading)  Date: _______________________   a.m. _____________________(1st reading) _____________________(2nd reading)   p.m. _____________________(1st reading) _____________________(2nd reading)  Date: _______________________   a.m. _____________________(1st reading) _____________________(2nd reading)   p.m. _____________________(1st reading) _____________________(2nd reading)  This information is not intended to replace advice given to you by your health care provider. Make sure you discuss any questions you have with your health care provider.  Document Released: 09/15/2004 Document Revised: 12/18/2018 Document Reviewed: 12/18/2018  [employer] Interactive Patient Education © 2019 [employer]        Result type: Education Note  Result date: January 19, 2022 8:41 CST  Result status: Auth (Verified)  Result title: Education Note  Performed by: Samantha Amaral LPN on January 19, 2022 8:41 CST  Verified by: Samantha Amaral LPN on January 19, 2022 8:41 CST  Encounter info: [id number], [facility]NAG[facility], Utilization Coordination, 1/19/2022 -

## 2022-07-27 NOTE — PROGRESS NOTES
Original encounter date 2/18/2022 in [email or web address] EMR.  Information placed in Epic for continuity purposes.  HealthE Care Entered On:  2/18/2022 9:46 CST    Performed On:  2/18/2022 9:37 CST by Samantha Amaral LPN               Discharge Past 30 Days   Visit to the Hospital in the Last 30 Days :   None   Perception of Change in Health Status DC :   Improving   Education Provided :   Chronic disease process, ED utilization, Importance of keeping appointments, Community resources, Medication Compliance, Diet Compliance, Other: benefits of pcp and all providers and ancillary care.   (Comment: dash diet [Samantha Amaral LPN - 2/18/2022 9:37 CST] )   Discharge Past 30 Days Addntl Comments :   spoke to pt for f/u call, doing well, no compliants. discussed upcoming appts. stressed importance of f/u care with all providers. pt reports seen pcp on 1/31/22 and next appt 2/28/22. applaud pt on attending pcp appt, pt reports he did not check to see if mail came in, will check. pt voices understanding to instructions given and appreciation.     Patient pregnant :   N/A   Samantha Amaral LPN - 2/18/2022 9:37 CST   Barriers to Care   SDoH Eat Less Than You Should :   No   SDoH Shut Off Services to Your Home :   No   SDoH No Regular Place to Live :   No   SDoH Needed Provider but Costs too Much :   No   SDoH Help Reading and Writing Paper Work :   No   SDoH Feel Lonely Often :   No   SDoH Missed Appt/Meds- No Transportation :   No   SDoH Call Dr To Be Seen Right Away :   Yes   SDoH Medical Problems Cause ED Visit :   No   SDoH Other Problems Affecting Health :   No   SDoH Reviewed :   Yes   SDoH Dentist Seen in Last Year :   Yes   Samantha Amaral LPN - 2/18/2022 9:37 CST   Appointments   Follow-Up Appt Scheduled :   Yes   Follow-Up Provider Appt Scheduled By :   Other: office staff   PCP Name :   OTILIA ORTIZ NP    Follow-Up Date :   2/28/2022 CST   PCP Visit Within Year :   Yes   PCP Visit One  Year Date :   1/31/2022 CST   Follow-Up Specialist Appt Scheduled :   Yes   Type of Specialist :   [facility]      07/27/22 09:30[facility]   Samantha Amaral LPN - 2/18/2022 9:37 CST   Navigation   ED FIN :   [id number]   Providers Patient Visited Last Year :   [facility]   OTILIA ORTIZ NP  (PCP)   Root Causes for High-ED Utilization :   Lack of access to care   Plan: :   Educated on appropriate ED utilization, Educated on alternate means of health care; ie urgent care when PCP not available, Educated on importance of follow up care with PCP, Referred to community resources; ie [place], Educated on importance of follow up preventative dental care with dentist, Other: ENCOURAGED PT TO MONITOR AND LOG B/P   Participation in Activity Designed to Address Lack of Annual Ambulatory or Preventative Care Visit? :   Yes   Participation in Activity Designed to Address Avoidable ED Utilization? :   Yes   During Current Measurement Year, Did Enrollee Receive Education Regarding Outpatient Primary Care Options? :   Yes   During Current Measurement Year, Did Enrollee Receive an Appt Reminder 24-48 Hours Before a Scheduled Appt? :   Yes   During Current Measurement Year, Did the Network Provider Schedule and Appt or Provide a Referral to Enrollee? :   Yes   Education Provided :   Verbal   Samantha Amaral LPN - 2/18/2022 9:37 CST     Result type: HealthE Care - Text  Result date: February 18, 2022 9:37 CST  Result status: Auth (Verified)  Result title: HealthE Care  Performed by: Samantha Amaral LPN on February 18, 2022 9:37 CST  Verified by: Samantha Amaral LPN on February 18, 2022 9:37 CST  Encounter info: [id number], [facility] COMMUNITY CARE MANAGEMENT, Utilization Coordination, 1/19/2022 -

## 2022-07-31 ENCOUNTER — HOSPITAL ENCOUNTER (EMERGENCY)
Facility: HOSPITAL | Age: 42
Discharge: HOME | End: 2022-07-31
Attending: STUDENT IN AN ORGANIZED HEALTH CARE EDUCATION/TRAINING PROGRAM
Payer: MEDICAID

## 2022-07-31 VITALS
RESPIRATION RATE: 16 BRPM | TEMPERATURE: 98 F | WEIGHT: 191.38 LBS | SYSTOLIC BLOOD PRESSURE: 142 MMHG | HEIGHT: 69 IN | BODY MASS INDEX: 28.35 KG/M2 | HEART RATE: 74 BPM | DIASTOLIC BLOOD PRESSURE: 82 MMHG | OXYGEN SATURATION: 98 %

## 2022-07-31 DIAGNOSIS — M25.561 RIGHT KNEE PAIN: ICD-10-CM

## 2022-07-31 PROCEDURE — 99284 EMERGENCY DEPT VISIT MOD MDM: CPT | Mod: 25

## 2022-07-31 PROCEDURE — 25000003 PHARM REV CODE 250: Performed by: NURSE PRACTITIONER

## 2022-07-31 RX ORDER — BACLOFEN 10 MG/1
10 TABLET ORAL 3 TIMES DAILY
Qty: 21 TABLET | Refills: 0 | Status: SHIPPED | OUTPATIENT
Start: 2022-07-31 | End: 2023-01-27 | Stop reason: ALTCHOICE

## 2022-07-31 RX ORDER — INDOMETHACIN 25 MG/1
25 CAPSULE ORAL 2 TIMES DAILY WITH MEALS
Qty: 14 CAPSULE | Refills: 0 | Status: SHIPPED | OUTPATIENT
Start: 2022-07-31 | End: 2022-08-07

## 2022-07-31 RX ORDER — KETOROLAC TROMETHAMINE 10 MG/1
10 TABLET, FILM COATED ORAL
Status: COMPLETED | OUTPATIENT
Start: 2022-07-31 | End: 2022-07-31

## 2022-07-31 RX ADMIN — KETOROLAC TROMETHAMINE 10 MG: 10 TABLET, FILM COATED ORAL at 10:07

## 2022-08-01 NOTE — ED PROVIDER NOTES
Encounter Date: 7/31/2022       History     Chief Complaint   Patient presents with    Knee Pain     Pt reports right knee pain and swelling that started last Monday and has gradually gotten worse. Pt states he tried to rest, ice and elevate extremity and it has not helped. Pt states pain is sharp when trying to bend knee and is 10/10. Pt took 2 Aleve this AM and has been using biofreeze on knee every few hours with no relief.      Pt is a 42 y.o. male who presents to the [facility] ED complaining of Rt knee pain. Symptoms since Monday of this week. Denies relief with OTC medications. Denies trauma to affected joint, redness to affected joint, chest pain, SOB, weakness, or dizziness.         Review of patient's allergies indicates:  No Known Allergies  Past Medical History:   Diagnosis Date    Hypertension      No past surgical history on file.  No family history on file.     Review of Systems   Constitutional: Negative for chills, diaphoresis, fatigue and fever.   HENT: Negative for facial swelling, postnasal drip, rhinorrhea, sinus pressure, sinus pain, sore throat and trouble swallowing.    Respiratory: Negative for cough, chest tightness, shortness of breath and wheezing.    Cardiovascular: Negative for chest pain, palpitations and leg swelling.   Gastrointestinal: Negative for abdominal pain, diarrhea, nausea and vomiting.   Genitourinary: Negative for dysuria, flank pain, hematuria and urgency.   Musculoskeletal: Positive for arthralgias, joint swelling and myalgias. Negative for back pain.   Skin: Negative for color change and rash.   Neurological: Negative for dizziness, syncope, weakness and headaches.   Hematological: Does not bruise/bleed easily.   All other systems reviewed and are negative.      Physical Exam     Initial Vitals [07/31/22 2053]   BP Pulse Resp Temp SpO2   (!) 147/89 89 18 97.7 °F (36.5 °C) 97 %      MAP       --         Physical Exam    Nursing note and vitals reviewed.  Constitutional:  Vital signs are normal. He appears well-developed and well-nourished.   HENT:   Head: Normocephalic.   Nose: Nose normal.   Mouth/Throat: Oropharynx is clear and moist.   Eyes: Conjunctivae and EOM are normal. Pupils are equal, round, and reactive to light.   Neck: Neck supple.   Normal range of motion.  Cardiovascular: Normal rate, regular rhythm, normal heart sounds and intact distal pulses.   Pulmonary/Chest: Effort normal and breath sounds normal. No respiratory distress. He has no wheezes. He has no rhonchi. He has no rales. He exhibits no tenderness.   Abdominal: Abdomen is soft and flat. Bowel sounds are normal. There is no abdominal tenderness. There is no rebound, no guarding, no tenderness at McBurney's point and negative Choudhary's sign.   Musculoskeletal:         General: Normal range of motion.      Cervical back: Normal range of motion and neck supple.      Right knee: Swelling and crepitus present. Normal range of motion. Tenderness present. Normal pulse.     Neurological: He is alert and oriented to person, place, and time. He has normal strength.   Skin: Skin is warm and dry. Capillary refill takes less than 2 seconds.   Psychiatric: He has a normal mood and affect. His behavior is normal. Judgment and thought content normal.         ED Course   Procedures  Labs Reviewed - No data to display       Imaging Results          X-Ray Knee 3 View Right (Preliminary result)  Result time 07/31/22 22:06:48    ED Interpretation by Sudhakar Lima Jr., ROSELINE (07/31/22 22:06:48, [facility], Emergency Medicine)    No acute fracture noted.                               Medications   ketorolac tablet 10 mg (has no administration in time range)     Medical Decision Making:   Differential Diagnosis:   Muscle strain  Arthritis  Clinical Tests:   Radiological Study: Ordered and Reviewed  ED Management:  10:06 PM Reassessed patient at this time. Reports condition has improved. Discussed with patient  all pertinent ED information and results. Discussed diagnosis and treatment plan with patient. Follow up instructions and return to ED instruction have been given. All questions and concerns were addressed at this time. Patient voices understanding of information and instructions. Patient is comfortable with plan and discharge. Patient is stable for discharge.                         Clinical Impression:   Final diagnoses:  [M25.561] Right knee pain          ED Disposition Condition    Discharge Stable        ED Prescriptions     Medication Sig Dispense Start Date End Date Auth. Provider    indomethacin (INDOCIN) 25 MG capsule Take 1 capsule (25 mg total) by mouth 2 (two) times daily with meals. for 7 days 14 capsule 7/31/2022 8/7/2022 ROSELINE Fonseca Jr.    baclofen (LIORESAL) 10 MG tablet Take 1 tablet (10 mg total) by mouth 3 (three) times daily. for 7 days 21 tablet 7/31/2022 8/7/2022 ROSELINE Fonseca Jr.        Follow-up Information     Follow up With Specialties Details Why Contact Info    [facility] Emergency Dept Emergency Medicine In 3 days As needed, If symptoms worsen [address] [facility] [address] [phone]           ROSELINE Fonseca Jr.  07/31/22 [id number]

## 2022-09-12 ENCOUNTER — HOSPITAL ENCOUNTER (EMERGENCY)
Facility: HOSPITAL | Age: 42
Discharge: HOME | End: 2022-09-13
Attending: SPECIALIST
Payer: MEDICAID

## 2022-09-12 DIAGNOSIS — R52 GENERALIZED BODY ACHES: ICD-10-CM

## 2022-09-12 DIAGNOSIS — B34.9 VIRAL SYNDROME: Primary | ICD-10-CM

## 2022-09-12 LAB
ALBUMIN SERPL-MCNC: 4.3 GM/DL (ref 3.5–5)
ALBUMIN/GLOB SERPL: 1.2 RATIO (ref 1.1–2)
ALP SERPL-CCNC: 83 UNIT/L (ref 40–150)
ALT SERPL-CCNC: 41 UNIT/L (ref 0–55)
AST SERPL-CCNC: 23 UNIT/L (ref 5–34)
BASOPHILS # BLD AUTO: 0.03 X10(3)/MCL (ref 0–0.2)
BASOPHILS NFR BLD AUTO: 0.2 %
BILIRUBIN DIRECT+TOT PNL SERPL-MCNC: 0.6 MG/DL
BUN SERPL-MCNC: 12.8 MG/DL (ref 8.9–20.6)
CALCIUM SERPL-MCNC: 9.7 MG/DL (ref 8.4–10.2)
CHLORIDE SERPL-SCNC: 101 MMOL/L (ref 98–107)
CO2 SERPL-SCNC: 24 MMOL/L (ref 22–29)
CREAT SERPL-MCNC: 1.14 MG/DL (ref 0.73–1.18)
EOSINOPHIL # BLD AUTO: 0.12 X10(3)/MCL (ref 0–0.9)
EOSINOPHIL NFR BLD AUTO: 0.7 %
ERYTHROCYTE [DISTWIDTH] IN BLOOD BY AUTOMATED COUNT: 13.8 % (ref 11.5–17)
FLUAV AG UPPER RESP QL IA.RAPID: NOT DETECTED
FLUBV AG UPPER RESP QL IA.RAPID: NOT DETECTED
GFR SERPLBLD CREATININE-BSD FMLA CKD-EPI: >60 MLS/MIN/1.73/M2
GLOBULIN SER-MCNC: 3.7 GM/DL (ref 2.4–3.5)
GLUCOSE SERPL-MCNC: 110 MG/DL (ref 74–100)
HCT VFR BLD AUTO: 44.8 % (ref 42–52)
HGB BLD-MCNC: 14.1 GM/DL (ref 14–18)
IMM GRANULOCYTES # BLD AUTO: 0.03 X10(3)/MCL (ref 0–0.04)
IMM GRANULOCYTES NFR BLD AUTO: 0.2 %
LYMPHOCYTES # BLD AUTO: 0.99 X10(3)/MCL (ref 0.6–4.6)
LYMPHOCYTES NFR BLD AUTO: 5.8 %
MCH RBC QN AUTO: 27.6 PG (ref 27–31)
MCHC RBC AUTO-ENTMCNC: 31.5 MG/DL (ref 33–36)
MCV RBC AUTO: 87.7 FL (ref 80–94)
MONOCYTES # BLD AUTO: 0.92 X10(3)/MCL (ref 0.1–1.3)
MONOCYTES NFR BLD AUTO: 5.4 %
NEUTROPHILS # BLD AUTO: 15 X10(3)/MCL (ref 2.1–9.2)
NEUTROPHILS NFR BLD AUTO: 87.7 %
PLATELET # BLD AUTO: 249 X10(3)/MCL (ref 130–400)
PMV BLD AUTO: 10.8 FL (ref 7.4–10.4)
POTASSIUM SERPL-SCNC: 4 MMOL/L (ref 3.5–5.1)
PROT SERPL-MCNC: 8 GM/DL (ref 6.4–8.3)
RBC # BLD AUTO: 5.11 X10(6)/MCL (ref 4.7–6.1)
SARS-COV-2 RNA RESP QL NAA+PROBE: NOT DETECTED
SODIUM SERPL-SCNC: 138 MMOL/L (ref 136–145)
STREP A PCR (OHS): NOT DETECTED
WBC # SPEC AUTO: 17.1 X10(3)/MCL (ref 4.5–11.5)

## 2022-09-12 PROCEDURE — 99284 EMERGENCY DEPT VISIT MOD MDM: CPT | Mod: 25

## 2022-09-12 PROCEDURE — 85025 COMPLETE CBC W/AUTO DIFF WBC: CPT | Performed by: SPECIALIST

## 2022-09-12 PROCEDURE — 80053 COMPREHEN METABOLIC PANEL: CPT | Performed by: SPECIALIST

## 2022-09-12 PROCEDURE — 96361 HYDRATE IV INFUSION ADD-ON: CPT

## 2022-09-12 PROCEDURE — 36415 COLL VENOUS BLD VENIPUNCTURE: CPT | Performed by: SPECIALIST

## 2022-09-12 PROCEDURE — 63600175 PHARM REV CODE 636 W HCPCS: Performed by: SPECIALIST

## 2022-09-12 PROCEDURE — 87636 SARSCOV2 & INF A&B AMP PRB: CPT | Mod: 59 | Performed by: SPECIALIST

## 2022-09-12 PROCEDURE — 25000003 PHARM REV CODE 250: Performed by: SPECIALIST

## 2022-09-12 PROCEDURE — 96374 THER/PROPH/DIAG INJ IV PUSH: CPT

## 2022-09-12 PROCEDURE — 81001 URINALYSIS AUTO W/SCOPE: CPT | Performed by: SPECIALIST

## 2022-09-12 PROCEDURE — 87631 RESP VIRUS 3-5 TARGETS: CPT | Performed by: SPECIALIST

## 2022-09-12 RX ORDER — IBUPROFEN 800 MG/1
800 TABLET ORAL EVERY 6 HOURS PRN
Qty: 20 TABLET | Refills: 0 | OUTPATIENT
Start: 2022-09-12 | End: 2022-11-15

## 2022-09-12 RX ORDER — IBUPROFEN 800 MG/1
800 TABLET ORAL
Status: COMPLETED | OUTPATIENT
Start: 2022-09-12 | End: 2022-09-12

## 2022-09-12 RX ORDER — ACETAMINOPHEN 500 MG
1000 TABLET ORAL
Status: COMPLETED | OUTPATIENT
Start: 2022-09-12 | End: 2022-09-12

## 2022-09-12 RX ORDER — PROMETHAZINE HYDROCHLORIDE AND DEXTROMETHORPHAN HYDROBROMIDE 6.25; 15 MG/5ML; MG/5ML
5 SYRUP ORAL EVERY 4 HOURS PRN
Qty: 120 ML | Refills: 0 | Status: SHIPPED | OUTPATIENT
Start: 2022-09-12 | End: 2022-09-22

## 2022-09-12 RX ORDER — ONDANSETRON 2 MG/ML
4 INJECTION INTRAMUSCULAR; INTRAVENOUS
Status: COMPLETED | OUTPATIENT
Start: 2022-09-12 | End: 2022-09-12

## 2022-09-12 RX ORDER — ONDANSETRON 4 MG/1
4 TABLET, FILM COATED ORAL EVERY 6 HOURS PRN
Qty: 12 TABLET | Refills: 0 | Status: SHIPPED | OUTPATIENT
Start: 2022-09-12 | End: 2023-01-27 | Stop reason: ALTCHOICE

## 2022-09-12 RX ORDER — GUAIFENESIN/DEXTROMETHORPHAN 100-10MG/5
10 SYRUP ORAL ONCE
Status: COMPLETED | OUTPATIENT
Start: 2022-09-13 | End: 2022-09-12

## 2022-09-12 RX ADMIN — GUAIFENESIN AND DEXTROMETHORPHAN 10 ML: 100; 10 SYRUP ORAL at 11:09

## 2022-09-12 RX ADMIN — ACETAMINOPHEN 1000 MG: 500 TABLET, FILM COATED ORAL at 09:09

## 2022-09-12 RX ADMIN — ONDANSETRON 4 MG: 2 INJECTION INTRAMUSCULAR; INTRAVENOUS at 11:09

## 2022-09-12 RX ADMIN — SODIUM CHLORIDE 1000 ML: 9 INJECTION, SOLUTION INTRAVENOUS at 11:09

## 2022-09-12 RX ADMIN — IBUPROFEN 800 MG: 800 TABLET, FILM COATED ORAL at 11:09

## 2022-09-12 NOTE — Clinical Note
"Fredi Meza" Keiry was seen and treated in our emergency department on 9/12/2022.  He may return to work on 09/16/2022.       If you have any questions or concerns, please don't hesitate to call.       RN    "

## 2022-09-13 VITALS
OXYGEN SATURATION: 96 % | SYSTOLIC BLOOD PRESSURE: 140 MMHG | DIASTOLIC BLOOD PRESSURE: 84 MMHG | HEART RATE: 101 BPM | TEMPERATURE: 99 F | RESPIRATION RATE: 20 BRPM

## 2022-09-13 LAB
AMPHET UR QL SCN: NEGATIVE
APPEARANCE UR: CLEAR
BACTERIA #/AREA URNS AUTO: NORMAL /HPF
BARBITURATE SCN PRESENT UR: NEGATIVE
BENZODIAZ UR QL SCN: NEGATIVE
BILIRUB UR QL STRIP.AUTO: NEGATIVE MG/DL
CANNABINOIDS UR QL SCN: NEGATIVE
COCAINE UR QL SCN: NEGATIVE
COLOR UR AUTO: YELLOW
GLUCOSE UR QL STRIP.AUTO: NEGATIVE MG/DL
KETONES UR QL STRIP.AUTO: NEGATIVE MG/DL
LEUKOCYTE ESTERASE UR QL STRIP.AUTO: NEGATIVE UNIT/L
NITRITE UR QL STRIP.AUTO: NEGATIVE
OPIATES UR QL SCN: NEGATIVE
PCP UR QL: NEGATIVE
PH UR STRIP.AUTO: 8.5 [PH]
PH UR: 8.5 [PH] (ref 3–11)
PROT UR QL STRIP.AUTO: 30 MG/DL
RBC #/AREA URNS AUTO: NORMAL /HPF
RBC UR QL AUTO: NEGATIVE UNIT/L
SP GR UR STRIP.AUTO: 1.01
SPECIFIC GRAVITY, URINE AUTO (.000) (OHS): 1.01 (ref 1–1.03)
SQUAMOUS #/AREA URNS AUTO: NORMAL /HPF
UROBILINOGEN UR STRIP-ACNC: 2 MG/DL
WBC #/AREA URNS AUTO: NORMAL /HPF

## 2022-09-13 PROCEDURE — 25000003 PHARM REV CODE 250: Performed by: SPECIALIST

## 2022-09-13 PROCEDURE — 80307 DRUG TEST PRSMV CHEM ANLYZR: CPT | Performed by: SPECIALIST

## 2022-09-13 RX ORDER — KETOROLAC TROMETHAMINE 30 MG/ML
30 INJECTION, SOLUTION INTRAMUSCULAR; INTRAVENOUS
Status: DISCONTINUED | OUTPATIENT
Start: 2022-09-13 | End: 2022-09-13

## 2022-09-13 RX ORDER — HYDROCODONE BITARTRATE AND ACETAMINOPHEN 5; 325 MG/1; MG/1
1 TABLET ORAL
Status: DISCONTINUED | OUTPATIENT
Start: 2022-09-13 | End: 2022-09-13

## 2022-09-13 NOTE — ED NOTES
Patient now states in June he had a heat stroke sent to [facility] inpatient 3 days had kidney failure. States he was out in heat yesterday and started with headache and bilateral flank pain today.

## 2022-09-13 NOTE — ED PROVIDER NOTES
Encounter Date: 9/12/2022       History     Chief Complaint   Patient presents with    Generalized Body Aches     C/o sore throat started yesterday body aches started today      Patient presents with fever, dry cough, backache and generalized body aches that began today; slight cough, nausea and nasal congestion    The history is provided by the patient.   Review of patient's allergies indicates:  No Known Allergies  Past Medical History:   Diagnosis Date    Hypertension      No past surgical history on file.  No family history on file.     Review of Systems   Constitutional: Negative.    Respiratory: Negative.     Cardiovascular: Negative.    Gastrointestinal: Negative.    Genitourinary: Negative.    Musculoskeletal: Negative.    Neurological: Negative.      Physical Exam     Initial Vitals   BP Pulse Resp Temp SpO2   09/12/22 2150 09/12/22 2150 09/12/22 2150 09/12/22 2148 09/12/22 2150   (!) 173/105 (!) 125 20 (!) 102.7 °F (39.3 °C) 95 %      MAP       --                Physical Exam    Nursing note and vitals reviewed.  Constitutional: He appears well-developed and well-nourished.   HENT:   Head: Normocephalic and atraumatic.   Mouth/Throat: Oropharynx is clear and moist.   Mild-to-moderate nasal congestion   Eyes: EOM are normal. Pupils are equal, round, and reactive to light.   Neck: Neck supple.   Normal range of motion.  Cardiovascular:  Regular rhythm and normal heart sounds.   Tachycardia present.         Pulmonary/Chest: Breath sounds normal. No respiratory distress. He has no wheezes.   Abdominal: Abdomen is soft. Bowel sounds are normal. He exhibits no distension. There is no abdominal tenderness.   Musculoskeletal:         General: Normal range of motion.      Cervical back: Normal range of motion and neck supple.     Neurological: He is alert and oriented to person, place, and time.   Skin: Skin is warm and dry. No rash noted.       ED Course   Procedures  Labs Reviewed   COMPREHENSIVE METABOLIC PANEL  - Abnormal; Notable for the following components:       Result Value    Glucose Level 110 (*)     Globulin 3.7 (*)     All other components within normal limits   URINALYSIS, REFLEX TO URINE CULTURE - Abnormal; Notable for the following components:    Protein, UA 30 (*)     Urobilinogen, UA 2.0 (*)     All other components within normal limits   CBC WITH DIFFERENTIAL - Abnormal; Notable for the following components:    WBC 17.1 (*)     MCHC 31.5 (*)     MPV 10.8 (*)     Neut # 15.0 (*)     All other components within normal limits   COVID/FLU A&B PCR - Normal   STREP GROUP A BY PCR - Normal   URINALYSIS, MICROSCOPIC - Normal   CBC W/ AUTO DIFFERENTIAL    Narrative:     The following orders were created for panel order CBC auto differential.  Procedure                               Abnormality         Status                     ---------                               -----------         ------                     CBC with Differential[[id number]]        Abnormal            Final result                 Please view results for these tests on the individual orders.   DRUG SCREEN, URINE (BEAKER)          Imaging Results              X-Ray Chest AP Portable (Preliminary result)  Result time 09/12/22 23:13:27      ED Interpretation by Avery Collins MD (09/12/22 23:13:27, [facility], Emergency Medicine)    Lungs clear                                     Medications   sodium chloride 0.9% bolus 1,000 mL (0 mLs Intravenous Stopped 9/13/22 0046)   ketorolac injection 30 mg (has no administration in time range)   HYDROcodone-acetaminophen 5-325 mg per tablet 1 tablet (has no administration in time range)   acetaminophen tablet 1,000 mg (1,000 mg Oral Given 9/12/22 2148)   ibuprofen tablet 800 mg (800 mg Oral Given 9/12/22 2307)   dextromethorphan-guaiFENesin  mg/5 ml liquid 10 mL (10 mLs Oral Given 9/12/22 2329)   ondansetron injection 4 mg (4 mg Intravenous Given 9/12/22 2346)                           Patient Vitals for the past 24 hrs:   BP Temp Temp src Pulse Resp SpO2   09/13/22 0012 (!) 140/84 99.2 °F (37.3 °C) Oral 101 20 96 %   09/12/22 2307 -- (!) 102.8 °F (39.3 °C) -- -- -- --   09/12/22 2150 (!) 173/105 (!) 102.7 °F (39.3 °C) Oral (!) 125 20 95 %   09/12/22 2148 -- (!) 102.7 °F (39.3 °C) -- -- -- --         Clinical Impression:   Final diagnoses:  [B34.9] Viral syndrome (Primary)  [R52] Generalized body aches        ED Disposition Condition    Discharge Stable          ED Prescriptions       Medication Sig Dispense Start Date End Date Auth. Provider    ibuprofen (ADVIL,MOTRIN) 800 MG tablet Take 1 tablet (800 mg total) by mouth every 6 (six) hours as needed for Pain. 20 tablet 9/12/2022 -- Avery Collins MD    ondansetron (ZOFRAN) 4 MG tablet Take 1 tablet (4 mg total) by mouth every 6 (six) hours as needed for Nausea. 12 tablet 9/12/2022 -- Avery Collins MD    promethazine-dextromethorphan (PROMETHAZINE-DM) 6.25-15 mg/5 mL Syrp Take 5 mLs by mouth every 4 (four) hours as needed. 120 mL 9/12/2022 9/22/2022 Avery Collins MD          Follow-up Information       Follow up With Specialties Details Why Contact Info    [facility] Emergency Medicine  As needed [address] [facility] [address] [phone]             Avery Collins MD  09/13/22 0027

## 2022-11-15 ENCOUNTER — HOSPITAL ENCOUNTER (EMERGENCY)
Facility: HOSPITAL | Age: 42
Discharge: HOME | End: 2022-11-15
Attending: FAMILY MEDICINE
Payer: MEDICAID

## 2022-11-15 VITALS
DIASTOLIC BLOOD PRESSURE: 101 MMHG | WEIGHT: 189.63 LBS | BODY MASS INDEX: 28.08 KG/M2 | OXYGEN SATURATION: 97 % | TEMPERATURE: 98 F | SYSTOLIC BLOOD PRESSURE: 144 MMHG | RESPIRATION RATE: 16 BRPM | HEIGHT: 69 IN | HEART RATE: 77 BPM

## 2022-11-15 DIAGNOSIS — M79.673 HEEL PAIN: Primary | ICD-10-CM

## 2022-11-15 PROCEDURE — 63600175 PHARM REV CODE 636 W HCPCS: Performed by: PHYSICIAN ASSISTANT

## 2022-11-15 PROCEDURE — 99284 EMERGENCY DEPT VISIT MOD MDM: CPT | Mod: 25

## 2022-11-15 PROCEDURE — 96372 THER/PROPH/DIAG INJ SC/IM: CPT | Performed by: PHYSICIAN ASSISTANT

## 2022-11-15 RX ORDER — INDOMETHACIN 50 MG/1
50 CAPSULE ORAL
Qty: 15 CAPSULE | Refills: 0 | Status: SHIPPED | OUTPATIENT
Start: 2022-11-15 | End: 2023-01-27 | Stop reason: ALTCHOICE

## 2022-11-15 RX ORDER — METHYLPREDNISOLONE SOD SUCC 125 MG
125 VIAL (EA) INJECTION
Status: COMPLETED | OUTPATIENT
Start: 2022-11-15 | End: 2022-11-15

## 2022-11-15 RX ORDER — METHYLPREDNISOLONE 4 MG/1
TABLET ORAL
Qty: 21 TABLET | Refills: 0 | Status: SHIPPED | OUTPATIENT
Start: 2022-11-15 | End: 2023-01-27 | Stop reason: ALTCHOICE

## 2022-11-15 RX ORDER — KETOROLAC TROMETHAMINE 30 MG/ML
30 INJECTION, SOLUTION INTRAMUSCULAR; INTRAVENOUS
Status: COMPLETED | OUTPATIENT
Start: 2022-11-15 | End: 2022-11-15

## 2022-11-15 RX ADMIN — KETOROLAC TROMETHAMINE 30 MG: 30 INJECTION, SOLUTION INTRAMUSCULAR at 08:11

## 2022-11-15 RX ADMIN — METHYLPREDNISOLONE SODIUM SUCCINATE 125 MG: 125 INJECTION, POWDER, FOR SOLUTION INTRAMUSCULAR; INTRAVENOUS at 08:11

## 2022-11-15 NOTE — ED PROVIDER NOTES
Encounter Date: 11/15/2022       History     Chief Complaint   Patient presents with    Heel Pain     CO lt heel pain since yesterday, denies injury.       43 yo M presents to ED c/o 1 day hx of pain in L heel. Denies any known injury to heel. Reports pain is exacerbated w/ weight bearing & ambulation. Denies joint swelling/erythema, wound or systemic symptoms. VSS on arrival w/ NAD.    Review of patient's allergies indicates:  No Known Allergies  Past Medical History:   Diagnosis Date    Hypertension      No past surgical history on file.  No family history on file.  Social History     Tobacco Use    Smoking status: Never    Smokeless tobacco: Never     Review of Systems   Constitutional:  Negative for chills and fever.   HENT:  Negative for congestion, rhinorrhea and sore throat.    Respiratory:  Negative for cough and shortness of breath.    Cardiovascular:  Negative for chest pain, palpitations and leg swelling.   Gastrointestinal:  Negative for abdominal pain, diarrhea, nausea and vomiting.   Genitourinary:  Negative for dysuria and hematuria.   Musculoskeletal:  Positive for arthralgias. Negative for back pain, gait problem, joint swelling and myalgias.   Skin:  Negative for color change, pallor, rash and wound.   Allergic/Immunologic: Negative for immunocompromised state.   Neurological:  Negative for dizziness, syncope, numbness and headaches.   All other systems reviewed and are negative.    Physical Exam     Initial Vitals [11/15/22 0710]   BP Pulse Resp Temp SpO2   (!) 144/101 77 16 97.9 °F (36.6 °C) 97 %      MAP       --         Physical Exam    Nursing note and vitals reviewed.  Constitutional: He appears well-developed and well-nourished. He is not diaphoretic. No distress.   HENT:   Head: Normocephalic and atraumatic.   Eyes: Conjunctivae and EOM are normal. Pupils are equal, round, and reactive to light.   Neck: Neck supple.   Normal range of motion.  Cardiovascular:  Normal rate, regular rhythm,  normal heart sounds and intact distal pulses.     Exam reveals no gallop and no friction rub.       No murmur heard.  Pulmonary/Chest: Breath sounds normal. No respiratory distress. He has no wheezes. He has no rhonchi. He has no rales.   Abdominal: Abdomen is soft. Bowel sounds are normal. He exhibits no distension. There is no abdominal tenderness. There is no rebound and no guarding.   Musculoskeletal:         General: No edema. Normal range of motion.      Cervical back: Normal range of motion and neck supple.      Right foot: Normal.      Left foot: Normal range of motion and normal capillary refill. Tenderness and bony tenderness present. No swelling, deformity, laceration or crepitus. Normal pulse.        Feet:      Neurological: He is alert and oriented to person, place, and time. No cranial nerve deficit or sensory deficit.   Skin: Skin is warm and dry. Capillary refill takes less than 2 seconds. No rash noted. No erythema. No pallor.   Psychiatric: He has a normal mood and affect.       ED Course   Procedures  Labs Reviewed - No data to display       Imaging Results              X-Ray Foot Complete Left (Final result)  Result time 11/15/22 07:42:16      Final result by Palomo Ramos MD (11/15/22 07:42:16)                   Narrative:    EXAMINATION  XR FOOT COMPLETE 3 VIEW LEFT    CLINICAL HISTORY  Pain in unspecified foot    TECHNIQUE  A total of 3 images submitted of the foot.    COMPARISON  None available at the time of initial interpretation.    FINDINGS  No displaced fracture or dislocation is identified. The visualized joint spaces are preserved. No aggressive osseous lesion or periosteal reaction is evident.    The included soft tissues are without acute abnormality.    IMPRESSION  No evidence of acute abnormality.      Electronically signed by: Palomo Ramos  Date:    11/15/2022  Time:    07:42                                     Medications   ketorolac injection 30 mg (has no administration in  time range)   methylPREDNISolone sodium succinate injection 125 mg (has no administration in time range)     Medical Decision Making:   Clinical Tests:   Radiological Study: Ordered and Reviewed  XR unremarkable for acute abnormality. No evidence for infectious process, gout or DVT on physical exam. Presentation consistent w/ plantar fasciitis. Discussed stretches & cold therapy. Will discharge w/ meds for symptom relief. Will refer to IM clinic to establish PCP. ED precautions given.                        Clinical Impression:   Final diagnoses:  [M79.673] Heel pain (Primary)        ED Disposition Condition    Discharge Stable          ED Prescriptions       Medication Sig Dispense Start Date End Date Auth. Provider    methylPREDNISolone (MEDROL DOSEPACK) 4 mg tablet Take all tablets as directed on packaging 21 tablet 11/15/2022 -- PETER Torres    indomethacin (INDOCIN) 50 MG capsule Take 1 capsule (50 mg total) by mouth 3 (three) times daily with meals. 15 capsule 11/15/2022 -- PETER Torres          Follow-up Information       Follow up With Specialties Details Why Contact Info Additional Information    [facility] Internal Medicine In 2 weeks  [address] [facility] [address] [phone] Internal Medicine Clinic Entrance #1    [facility] Emergency Medicine  If symptoms worsen [address] [facility] [address] [phone]              PETER Torres  11/15/22 1771

## 2022-11-15 NOTE — DISCHARGE INSTRUCTIONS
Report to Emergency Department if symptoms return or worsen; [facility] - Medicine Clinic Within 1 to 2 days, It is important that you follow up with your primary care provider or specialist if indicated for further evaluation, workup, and treatment as necessary. The exam and treatment you received in Emergency Department was for an urgent problem and NOT INTENDED AS COMPLETE CARE. It is important that you FOLLOW UP with a doctor for ongoing care. If your symptoms become WORSE or you DO NOT IMPROVE and you are unable to reach your health care provider, you should RETURN to the Emergency Department. The Emergency Department provider has provided a PRELIMINARY INTERPRETATION of all your studies. A final interpretation may be done after you are discharged. If a change in your diagnosis or treatment is needed WE WILL CONTACT YOU. It is critical that we have a CURRENT PHONE NUMBER FOR YOU.

## 2022-11-23 ENCOUNTER — TELEPHONE (OUTPATIENT)
Dept: ADMINISTRATIVE | Facility: HOSPITAL | Age: 42
End: 2022-11-23
Payer: MEDICAID

## 2022-11-24 NOTE — TELEPHONE ENCOUNTER
Patient is on my template as a new patient 11/30/22. Per documentation, he follows Melanie Ellis NP. Last visit 6/21/22 with a RTC 6 months/around 12/21/22. This referral was from the ED. If patient still seeing Ms. Ellis, I'd recommend he contact her office for an ED f/u.    Thanks

## 2022-11-25 NOTE — TELEPHONE ENCOUNTER
Called pt to inform him and he stated that Melanie Ellis NP is retired which is why he was referred.    Please advise

## 2022-12-19 ENCOUNTER — OFFICE VISIT (OUTPATIENT)
Dept: GASTROENTEROLOGY | Facility: CLINIC | Age: 42
End: 2022-12-19
Payer: MEDICAID

## 2022-12-19 VITALS
SYSTOLIC BLOOD PRESSURE: 133 MMHG | HEIGHT: 68 IN | WEIGHT: 195 LBS | OXYGEN SATURATION: 96 % | DIASTOLIC BLOOD PRESSURE: 83 MMHG | HEART RATE: 78 BPM | BODY MASS INDEX: 29.55 KG/M2 | TEMPERATURE: 98 F | RESPIRATION RATE: 16 BRPM

## 2022-12-19 DIAGNOSIS — T39.395A GASTRIC ULCER DUE TO NONSTEROIDAL ANTIINFLAMMATORY DRUG (NSAID) THERAPY: ICD-10-CM

## 2022-12-19 DIAGNOSIS — K59.09 CHRONIC CONSTIPATION: ICD-10-CM

## 2022-12-19 DIAGNOSIS — K25.9 GASTRIC ULCER DUE TO NONSTEROIDAL ANTIINFLAMMATORY DRUG (NSAID) THERAPY: ICD-10-CM

## 2022-12-19 DIAGNOSIS — D64.9 NORMOCYTIC ANEMIA: Primary | ICD-10-CM

## 2022-12-19 PROCEDURE — 99214 OFFICE O/P EST MOD 30 MIN: CPT | Mod: S$PBB,,, | Performed by: NURSE PRACTITIONER

## 2022-12-19 PROCEDURE — 99214 PR OFFICE/OUTPT VISIT, EST, LEVL IV, 30-39 MIN: ICD-10-PCS | Mod: S$PBB,,, | Performed by: NURSE PRACTITIONER

## 2022-12-19 PROCEDURE — 4010F PR ACE/ARB THEARPY RXD/TAKEN: ICD-10-PCS | Mod: CPTII,,, | Performed by: NURSE PRACTITIONER

## 2022-12-19 PROCEDURE — 4010F ACE/ARB THERAPY RXD/TAKEN: CPT | Mod: CPTII,,, | Performed by: NURSE PRACTITIONER

## 2022-12-19 PROCEDURE — 3079F PR MOST RECENT DIASTOLIC BLOOD PRESSURE 80-89 MM HG: ICD-10-PCS | Mod: CPTII,,, | Performed by: NURSE PRACTITIONER

## 2022-12-19 PROCEDURE — 3075F SYST BP GE 130 - 139MM HG: CPT | Mod: CPTII,,, | Performed by: NURSE PRACTITIONER

## 2022-12-19 PROCEDURE — 3008F BODY MASS INDEX DOCD: CPT | Mod: CPTII,,, | Performed by: NURSE PRACTITIONER

## 2022-12-19 PROCEDURE — 3075F PR MOST RECENT SYSTOLIC BLOOD PRESS GE 130-139MM HG: ICD-10-PCS | Mod: CPTII,,, | Performed by: NURSE PRACTITIONER

## 2022-12-19 PROCEDURE — 1160F PR REVIEW ALL MEDS BY PRESCRIBER/CLIN PHARMACIST DOCUMENTED: ICD-10-PCS | Mod: CPTII,,, | Performed by: NURSE PRACTITIONER

## 2022-12-19 PROCEDURE — 99215 OFFICE O/P EST HI 40 MIN: CPT | Mod: PBBFAC | Performed by: NURSE PRACTITIONER

## 2022-12-19 PROCEDURE — 3008F PR BODY MASS INDEX (BMI) DOCUMENTED: ICD-10-PCS | Mod: CPTII,,, | Performed by: NURSE PRACTITIONER

## 2022-12-19 PROCEDURE — 1159F MED LIST DOCD IN RCRD: CPT | Mod: CPTII,,, | Performed by: NURSE PRACTITIONER

## 2022-12-19 PROCEDURE — 1160F RVW MEDS BY RX/DR IN RCRD: CPT | Mod: CPTII,,, | Performed by: NURSE PRACTITIONER

## 2022-12-19 PROCEDURE — 3079F DIAST BP 80-89 MM HG: CPT | Mod: CPTII,,, | Performed by: NURSE PRACTITIONER

## 2022-12-19 PROCEDURE — 1159F PR MEDICATION LIST DOCUMENTED IN MEDICAL RECORD: ICD-10-PCS | Mod: CPTII,,, | Performed by: NURSE PRACTITIONER

## 2022-12-19 NOTE — ASSESSMENT & PLAN NOTE
Hospitalized June 21, 2021 to June 23, 2021 due to GI bleed secondary NSAID use  Physical exam revealed moderate epigastric abdominal tenderness and positive guaiac.   Laboratory results revealed negative Covid; glucose 104, BUN 42, and otherwise unremarkable CMP; lipase 20; WBC 13.7 and otherwise unremarkable CBC; unremarkable urinalysis.   Chest x-ray was unremarkable.   CT scan abdomen and pelvis with contrast revealed a large amount of stool seen throughout the colon which may be due to constipation, questionable irregular wall thickening of the antrum of the stomach possibly related to peristalsis however gastritis is not excluded, and normal-appearing appendix.   Hemoglobin dropped 14.2--14.0--11.7--10.2--10.5.   He was transferred to [facility] for EGD due to no GI services available at [facility].   He underwent EGD with Dr. Ramirez June 23, 2021 with findings of normal esophagus, nonbleeding gastric ulcer with no stigmata of bleeding, and normal examined duodenum. Pathology revealed acute and chronic gastritis with ulceration and fibrinopurulent exudate, negative for intestinal metaplasia, dysplasia or Helicobacter pylori.   Laboratory results June 28, 2021 revealed iron level 21, transferrin 319, TIBC 360, iron saturation 6%, ferritin 10.26; hemoglobin 11.3, hematocrit 35.6, MPV 11.7, and otherwise unremarkable CBC.   Laboratory results July 2, 2021 revealed hemoglobin 11.3, hematocrit 34.9, platelets 403, MPV 11.3, and otherwise unremarkable CBC.  He underwent EGD and colonoscopy March 30, 2022.  EGD revealed scar in the gastric antrum and erythematous mucosa in the antrum with an otherwise unremarkable exam.  He was advised to decrease pantoprazole to 40 mg once daily at that time.  Colonoscopy revealed examined portion of ileum normal and internal hemorrhoids with an otherwise unremarkable exam and recommended recall of 5 years.  Hemoglobin and hematocrit were 14.1 and 44.8% September 12, 2022.  Continue pantoprazole  40 mg daily  Repeat CBC, CMP, iron profile, and ferritin  Hemorrhoidal banding discussed  Avoid NSAID use  GERD lifestyle modifications  Reflux precautions  Call with updates  F/u clinic visit with NP in 4 months

## 2022-12-19 NOTE — ASSESSMENT & PLAN NOTE
See above  Recommend soluble fiber supplementation  Avoid straining or sitting on the toilet for long periods of time  Linzess 145 mg daily  Call with updates  F/u clinic visit with NP in 4 months

## 2022-12-19 NOTE — PROGRESS NOTES
Subjective:       Patient ID: Fredi Ricci is a 42 y.o. male.    Chief Complaint: Abdominal Pain (Lower abdomen) and Anemia    This 42-year-old  male with a history of hypertension, migraine headaches, constipation, and GERD presents unaccompanied for a follow-up visit.  He presented to the ED at [facility] June 21, 2021 with reports of shortness of breath that started 2 hours prior to arrival with severe bilateral lower abdominal and epigastric abdominal pain that started the previous week.  The abdominal pain progressively worsened.  He denied nausea, vomiting, hematemesis, fever, dizziness, diarrhea, constipation, unintentional weight loss, dysphagia, chest pain, or palpitations.  His last bowel movement was earlier that morning and described as black in color and formed in consistency.  He has been feeling tired for approximately 1 week.  He took 1 BC powder for a migraine headache and Prilosec for abdominal pain.  He endorsed taking 2-3 BC powders daily for migraine headaches for the past 10 years.  Upon arrival to ED, blood pressure was mildly elevated and he was mildly tachycardic.  Physical exam revealed moderate epigastric abdominal tenderness and positive guaiac.  Laboratory results revealed negative Covid; glucose 104, BUN 42, and otherwise unremarkable CMP; lipase 20; WBC 13.7 and otherwise unremarkable CBC; unremarkable urinalysis.  Chest x-ray was unremarkable.  CT scan abdomen and pelvis with contrast revealed a large amount of stool seen throughout the colon which may be due to constipation, questionable irregular wall thickening of the antrum of the stomach possibly related to peristalsis however gastritis is not excluded, and normal-appearing appendix.  Hemoglobin dropped 14.2--14.0--11.7--10.2--10.5.  He was transferred to [facility] for EGD due to no GI services available at [facility].  He underwent EGD with Dr. Ramirez June 23, 2021 with findings of normal esophagus, nonbleeding gastric ulcer  with no stigmata of bleeding, and normal examined duodenum.  Pathology revealed acute and chronic gastritis with ulceration and fibrinopurulent exudate, negative for intestinal metaplasia, dysplasia or Helicobacter pylori.  He was started on Protonix 40 mg twice daily and advised avoidance of NSAIDs.  He was referred to GI clinic at [facility] for follow-up and ultimately discharged home June 23, 2021.    When seen June 28, 2021, he continued to take pantoprazole 40 mg twice daily.  He continued with intermittent postprandial mid epigastric abdominal burning and dark stools since leaving the hospital.  He no longer took NSAIDs regularly.  He had persistent intermittent fatigue and shortness of breath with exertion.  His appetite was good and his weight was stable.  He denied fever, chills, nausea, vomiting, hematemesis, odynophagia, dysphagia, acid reflux, heartburn, or early satiety.  He had 2 bowel movements daily without hematochezia, fecal urgency, fecal incontinence, or pain with defecation.    Laboratory results June 28, 2021 revealed iron level 21, transferrin 319, TIBC 360, iron saturation 6%, ferritin 10.26; hemoglobin 11.3, hematocrit 35.6, MPV 11.7, and otherwise unremarkable CBC.  Laboratory results July 2, 2021 revealed hemoglobin 11.3, hematocrit 34.9, platelets 403, MPV 11.3, and otherwise unremarkable CBC.    He was last seen for a follow-up visit January 26, 2022.  He continued to take Protonix 40 mg twice daily and requested a refill.  He had been out of oral iron supplementation for several weeks and also requested a refill.  He reported feeling well unless he did not take his PPI medication.  At that time, he would experience epigastric abdominal burning.  His appetite was good and his weight was stable.  He denied fever, chills, nausea, vomiting, hematemesis, odynophagia, dysphagia, acid reflux, heartburn, or early satiety.  He had 2 bowel movements daily without melena, hematochezia, fecal urgency,  fecal incontinence, or pain with defecation.      He underwent EGD and colonoscopy March 30, 2022.  EGD revealed scar in the gastric antrum and erythematous mucosa in the antrum with an otherwise unremarkable exam.  He was advised to decrease pantoprazole to 40 mg once daily at that time.  Colonoscopy revealed examined portion of ileum normal and internal hemorrhoids with an otherwise unremarkable exam and recommended recall of 5 years.    Hemoglobin and hematocrit were 14.1 and 44.8% September 12, 2022.    Today, he presents for a follow-up visit.  He reports intermittent mild lower abdominal pain for the past several months that is mostly relieved with defecation.  He reports one bowel movement every 1-2 days and not feeling completely evacuated with defecation.  He has frequent straining.  He has occasional red blood with bowel movements noted on the tissue after wiping at least every 1-2 weeks.  He denies taking anything for treatment of constipation.  He denies nocturnal symptoms, appetite changes, unintentional weight loss, fever, chills, nausea, vomiting, hematemesis, odynophagia, dysphagia, acid reflux, pyrosis, or early satiety.  He denies melena, fecal urgency, fecal incontinence, or pain with defecation.    He takes aspirin 81 mg daily.  He denies tobacco use and drinks 1 beer every other day.  He denies illicit drug use.  He denies a family history of IBD, colon polyps, or colon cancer.    Review of patient's allergies indicates:  No Known Allergies    Past Medical History:   Diagnosis Date    Anemia     Gastric ulcer     Hypertension      Past Surgical History:   Procedure Laterality Date    COLONOSCOPY      UPPER GASTROINTESTINAL ENDOSCOPY       Family History:   family history includes Diabetes in his father; Heart disease in his maternal grandfather.    Social History:    reports that he has never smoked. He has never used smokeless tobacco. He reports current alcohol use of about 14.0 standard  drinks per week. He reports that he does not currently use drugs.    Review of Systems  Negative except as noted in the HPI.      Objective:      Physical Exam  Constitutional:       Appearance: Normal appearance.   HENT:      Head: Normocephalic.      Mouth/Throat:      Mouth: Mucous membranes are moist.   Eyes:      Extraocular Movements: Extraocular movements intact.      Conjunctiva/sclera: Conjunctivae normal.      Pupils: Pupils are equal, round, and reactive to light.   Cardiovascular:      Rate and Rhythm: Normal rate and regular rhythm.      Pulses: Normal pulses.      Heart sounds: Normal heart sounds.   Pulmonary:      Effort: Pulmonary effort is normal.      Breath sounds: Normal breath sounds.   Abdominal:      General: Bowel sounds are normal.      Palpations: Abdomen is soft.      Comments: Mild lower abdominal tenderness noted with deep palpation, no rebound or guarding   Musculoskeletal:         General: Normal range of motion.      Cervical back: Normal range of motion and neck supple.   Skin:     General: Skin is warm and dry.   Neurological:      General: No focal deficit present.      Mental Status: He is alert and oriented to person, place, and time.   Psychiatric:         Mood and Affect: Mood normal.         Behavior: Behavior normal.         Thought Content: Thought content normal.         Judgment: Judgment normal.       Home Medications:     Current Outpatient Medications   Medication Sig    amLODIPine (NORVASC) 5 MG tablet Take 5 mg by mouth once daily.    butalbital-acetaminophen-caffeine -40 mg (FIORICET, ESGIC) -40 mg per tablet Take 1 tablet by mouth every 6 (six) hours as needed.    ferrous sulfate 325 (65 FE) MG EC tablet Take 1 tablet by mouth once daily.    lisinopriL-hydrochlorothiazide (PRINZIDE,ZESTORETIC) 20-12.5 mg per tablet Take 1 tablet by mouth once daily.    meclizine (ANTIVERT) 25 mg tablet Take 1 tablet (25 mg total) by mouth 3 (three) times daily as  needed.    pantoprazole (PROTONIX) 40 MG tablet Take 40 mg by mouth once daily.    propranoloL (INDERAL) 40 MG tablet Take 40 mg by mouth 2 (two) times daily.    baclofen (LIORESAL) 10 MG tablet Take 1 tablet (10 mg total) by mouth 3 (three) times daily. for 7 days    indomethacin (INDOCIN) 50 MG capsule Take 1 capsule (50 mg total) by mouth 3 (three) times daily with meals. (Patient not taking: Reported on 12/19/2022)    methylPREDNISolone (MEDROL DOSEPACK) 4 mg tablet Take all tablets as directed on packaging (Patient not taking: Reported on 12/19/2022)    ondansetron (ZOFRAN) 4 MG tablet Take 1 tablet (4 mg total) by mouth every 6 (six) hours as needed for Nausea. (Patient not taking: Reported on 12/19/2022)     Laboratory Results:     Recent Results (from the past 4032 hour(s))   COVID/FLU A&B PCR    Collection Time: 09/12/22 10:06 PM   Result Value Ref Range    Influenza A PCR Not Detected Not Detected    Influenza B PCR Not Detected Not Detected    SARS-CoV-2 PCR Not Detected Not Detected   Strep Group A by PCR    Collection Time: 09/12/22 10:06 PM   Result Value Ref Range    STREP A PCR ([facility]) Not Detected Not Detected   Comprehensive metabolic panel    Collection Time: 09/12/22 11:18 PM   Result Value Ref Range    Sodium Level 138 136 - 145 mmol/L    Potassium Level 4.0 3.5 - 5.1 mmol/L    Chloride 101 98 - 107 mmol/L    Carbon Dioxide 24 22 - 29 mmol/L    Glucose Level 110 (H) 74 - 100 mg/dL    Blood Urea Nitrogen 12.8 8.9 - 20.6 mg/dL    Creatinine 1.14 0.73 - 1.18 mg/dL    Calcium Level Total 9.7 8.4 - 10.2 mg/dL    Protein Total 8.0 6.4 - 8.3 gm/dL    Albumin Level 4.3 3.5 - 5.0 gm/dL    Globulin 3.7 (H) 2.4 - 3.5 gm/dL    Albumin/Globulin Ratio 1.2 1.1 - 2.0 ratio    Bilirubin Total 0.6 <=1.5 mg/dL    Alkaline Phosphatase 83 40 - 150 unit/L    Alanine Aminotransferase 41 0 - 55 unit/L    Aspartate Aminotransferase 23 5 - 34 unit/L    eGFR >60 mls/min/1.73/m2   CBC with Differential    Collection Time:  09/12/22 11:18 PM   Result Value Ref Range    WBC 17.1 (H) 4.5 - 11.5 x10(3)/mcL    RBC 5.11 4.70 - 6.10 x10(6)/mcL    Hgb 14.1 14.0 - 18.0 gm/dL    Hct 44.8 42.0 - 52.0 %    MCV 87.7 80.0 - 94.0 fL    MCH 27.6 27.0 - 31.0 pg    MCHC 31.5 (L) 33.0 - 36.0 mg/dL    RDW 13.8 11.5 - 17.0 %    Platelet 249 130 - 400 x10(3)/mcL    MPV 10.8 (H) 7.4 - 10.4 fL    Neut % 87.7 %    Lymph % 5.8 %    Mono % 5.4 %    Eos % 0.7 %    Basophil % 0.2 %    Lymph # 0.99 0.6 - 4.6 x10(3)/mcL    Neut # 15.0 (H) 2.1 - 9.2 x10(3)/mcL    Mono # 0.92 0.1 - 1.3 x10(3)/mcL    Eos # 0.12 0 - 0.9 x10(3)/mcL    Baso # 0.03 0 - 0.2 x10(3)/mcL    IG# 0.03 0 - 0.04 x10(3)/mcL    IG% 0.2 %   Urinalysis, Reflex to Urine Culture Urine, Clean Catch    Collection Time: 09/12/22 11:53 PM    Specimen: Urine   Result Value Ref Range    Color, UA Yellow Yellow, Colorless, Other, Clear    Appearance, UA Clear Clear    Specific Gravity, UA 1.015     pH, UA 8.5 5.0, 5.5, 6.0, 6.5, 7.0, 7.5, 8.0, 8.5    Protein, UA 30 (A) Negative, 300  mg/dL    Glucose, UA Negative Negative, Normal mg/dL    Ketones, UA Negative Negative, +1, +2, +3, +4, +5, >=160, >=80 mg/dL    Blood, UA Negative Negative unit/L    Bilirubin, UA Negative Negative mg/dL    Urobilinogen, UA 2.0 (A) 0.2, 1.0, Normal mg/dL    Nitrites, UA Negative Negative    Leukocyte Esterase, UA Negative Negative, 75  unit/L   Urinalysis, Microscopic    Collection Time: 09/12/22 11:53 PM   Result Value Ref Range    Bacteria, UA None Seen None Seen, Rare, Occasional /HPF    RBC, UA None Seen None Seen, 0-2, 3-5, 0-5 /HPF    WBC, UA 0-2 None Seen, 0-2, 3-5, 0-5 /HPF    Squamous Epithelial Cells, UA None Seen None Seen, Rare, Occasional, Occ /HPF   Drug Screen, Urine    Collection Time: 09/13/22 12:25 AM   Result Value Ref Range    Amphetamines, Urine Negative Negative    Barbituates, Urine Negative Negative    Benzodiazepine, Urine Negative Negative    Cannabinoids, Urine Negative Negative    Cocaine, Urine  Negative Negative    Opiates, Urine Negative Negative    Phencyclidine, Urine Negative Negative    pH, Urine 8.5 3.0 - 11.0    Specific Gravity, Urine Auto 1.015 1.001 - 1.035   SARS-COV-2 (COVID-19) QUALITATIVE PCR    Collection Time: 10/26/22  9:13 PM   Result Value Ref Range    SARS-CoV2 (COVID-19) Qualitative PCR Negative Negative, see note     Assessment/Plan:     Problem List Items Addressed This Visit          Oncology    Normocytic anemia - Primary     Hospitalized June 21, 2021 to June 23, 2021 due to GI bleed secondary NSAID use  Physical exam revealed moderate epigastric abdominal tenderness and positive guaiac.   Laboratory results revealed negative Covid; glucose 104, BUN 42, and otherwise unremarkable CMP; lipase 20; WBC 13.7 and otherwise unremarkable CBC; unremarkable urinalysis.   Chest x-ray was unremarkable.   CT scan abdomen and pelvis with contrast revealed a large amount of stool seen throughout the colon which may be due to constipation, questionable irregular wall thickening of the antrum of the stomach possibly related to peristalsis however gastritis is not excluded, and normal-appearing appendix.   Hemoglobin dropped 14.2--14.0--11.7--10.2--10.5.   He was transferred to [facility] for EGD due to no GI services available at [facility].   He underwent EGD with Dr. Ramirez June 23, 2021 with findings of normal esophagus, nonbleeding gastric ulcer with no stigmata of bleeding, and normal examined duodenum. Pathology revealed acute and chronic gastritis with ulceration and fibrinopurulent exudate, negative for intestinal metaplasia, dysplasia or Helicobacter pylori.   Laboratory results June 28, 2021 revealed iron level 21, transferrin 319, TIBC 360, iron saturation 6%, ferritin 10.26; hemoglobin 11.3, hematocrit 35.6, MPV 11.7, and otherwise unremarkable CBC.   Laboratory results July 2, 2021 revealed hemoglobin 11.3, hematocrit 34.9, platelets 403, MPV 11.3, and otherwise unremarkable CBC.  He underwent  EGD and colonoscopy March 30, 2022.  EGD revealed scar in the gastric antrum and erythematous mucosa in the antrum with an otherwise unremarkable exam.  He was advised to decrease pantoprazole to 40 mg once daily at that time.  Colonoscopy revealed examined portion of ileum normal and internal hemorrhoids with an otherwise unremarkable exam and recommended recall of 5 years.  Hemoglobin and hematocrit were 14.1 and 44.8% September 12, 2022.  Continue pantoprazole 40 mg daily  Repeat CBC, CMP, iron profile, and ferritin  Hemorrhoidal banding discussed  Avoid NSAID use  GERD lifestyle modifications  Reflux precautions  Call with updates  F/u clinic visit with NP in 4 months         Relevant Orders    CBC Auto Differential    Comprehensive Metabolic Panel    Ferritin    Iron and TIBC       GI    Gastric ulcer due to nonsteroidal antiinflammatory drug (NSAID) therapy     See above         Relevant Orders    CBC Auto Differential    Comprehensive Metabolic Panel    Ferritin    Iron and TIBC    Chronic constipation     See above  Recommend soluble fiber supplementation  Avoid straining or sitting on the toilet for long periods of time  Linzess 145 mg daily  Call with updates  F/u clinic visit with NP in 4 months         Relevant Medications    linaCLOtide (LINZESS) 145 mcg Cap capsule

## 2023-01-03 DIAGNOSIS — K25.9 GASTRIC ULCER DUE TO NONSTEROIDAL ANTIINFLAMMATORY DRUG (NSAID) THERAPY: ICD-10-CM

## 2023-01-03 DIAGNOSIS — D64.9 NORMOCYTIC ANEMIA: Primary | ICD-10-CM

## 2023-01-03 DIAGNOSIS — T39.395A GASTRIC ULCER DUE TO NONSTEROIDAL ANTIINFLAMMATORY DRUG (NSAID) THERAPY: ICD-10-CM

## 2023-01-03 RX ORDER — PANTOPRAZOLE SODIUM 40 MG/1
40 TABLET, DELAYED RELEASE ORAL DAILY
Qty: 30 TABLET | Refills: 5 | OUTPATIENT
Start: 2023-01-03 | End: 2023-06-26

## 2023-01-19 ENCOUNTER — TELEPHONE (OUTPATIENT)
Dept: GASTROENTEROLOGY | Facility: CLINIC | Age: 43
End: 2023-01-19
Payer: MEDICAID

## 2023-01-20 ENCOUNTER — LAB VISIT (OUTPATIENT)
Dept: LAB | Facility: HOSPITAL | Age: 43
End: 2023-01-20
Attending: NURSE PRACTITIONER
Payer: MEDICAID

## 2023-01-20 ENCOUNTER — TELEPHONE (OUTPATIENT)
Dept: GASTROENTEROLOGY | Facility: CLINIC | Age: 43
End: 2023-01-20
Payer: MEDICAID

## 2023-01-20 DIAGNOSIS — D64.9 NORMOCYTIC ANEMIA: ICD-10-CM

## 2023-01-20 DIAGNOSIS — K25.9 GASTRIC ULCER DUE TO NONSTEROIDAL ANTIINFLAMMATORY DRUG (NSAID) THERAPY: ICD-10-CM

## 2023-01-20 DIAGNOSIS — T39.395A GASTRIC ULCER DUE TO NONSTEROIDAL ANTIINFLAMMATORY DRUG (NSAID) THERAPY: ICD-10-CM

## 2023-01-20 LAB
ALBUMIN SERPL-MCNC: 4.1 G/DL (ref 3.5–5)
ALBUMIN/GLOB SERPL: 1.1 RATIO (ref 1.1–2)
ALP SERPL-CCNC: 72 UNIT/L (ref 40–150)
ALT SERPL-CCNC: 55 UNIT/L (ref 0–55)
AST SERPL-CCNC: 36 UNIT/L (ref 5–34)
BASOPHILS # BLD AUTO: 0.02 X10(3)/MCL (ref 0–0.2)
BASOPHILS NFR BLD AUTO: 0.2 %
BILIRUBIN DIRECT+TOT PNL SERPL-MCNC: 0.3 MG/DL
BUN SERPL-MCNC: 12.7 MG/DL (ref 8.9–20.6)
CALCIUM SERPL-MCNC: 9.4 MG/DL (ref 8.4–10.2)
CHLORIDE SERPL-SCNC: 104 MMOL/L (ref 98–107)
CO2 SERPL-SCNC: 27 MMOL/L (ref 22–29)
CREAT SERPL-MCNC: 0.89 MG/DL (ref 0.73–1.18)
EOSINOPHIL # BLD AUTO: 0.34 X10(3)/MCL (ref 0–0.9)
EOSINOPHIL NFR BLD AUTO: 4.1 %
ERYTHROCYTE [DISTWIDTH] IN BLOOD BY AUTOMATED COUNT: 13.6 % (ref 11.5–17)
FERRITIN SERPL-MCNC: 79.25 NG/ML (ref 21.81–274.66)
GFR SERPLBLD CREATININE-BSD FMLA CKD-EPI: >60 MLS/MIN/1.73/M2
GLOBULIN SER-MCNC: 3.7 GM/DL (ref 2.4–3.5)
GLUCOSE SERPL-MCNC: 117 MG/DL (ref 74–100)
HCT VFR BLD AUTO: 45.5 % (ref 42–52)
HGB BLD-MCNC: 14.5 GM/DL (ref 14–18)
IMM GRANULOCYTES # BLD AUTO: 0.02 X10(3)/MCL (ref 0–0.04)
IMM GRANULOCYTES NFR BLD AUTO: 0.2 %
IRON SATN MFR SERPL: 20 % (ref 20–50)
IRON SERPL-MCNC: 61 UG/DL (ref 65–175)
LYMPHOCYTES # BLD AUTO: 2.51 X10(3)/MCL (ref 0.6–4.6)
LYMPHOCYTES NFR BLD AUTO: 30.5 %
MCH RBC QN AUTO: 28.1 PG
MCHC RBC AUTO-ENTMCNC: 31.9 MG/DL (ref 33–36)
MCV RBC AUTO: 88.2 FL (ref 80–94)
MONOCYTES # BLD AUTO: 0.35 X10(3)/MCL (ref 0.1–1.3)
MONOCYTES NFR BLD AUTO: 4.3 %
NEUTROPHILS # BLD AUTO: 4.98 X10(3)/MCL (ref 2.1–9.2)
NEUTROPHILS NFR BLD AUTO: 60.7 %
NRBC BLD AUTO-RTO: 0 %
PLATELET # BLD AUTO: 326 X10(3)/MCL (ref 130–400)
PMV BLD AUTO: 11.7 FL (ref 7.4–10.4)
POTASSIUM SERPL-SCNC: 3.8 MMOL/L (ref 3.5–5.1)
PROT SERPL-MCNC: 7.8 GM/DL (ref 6.4–8.3)
RBC # BLD AUTO: 5.16 X10(6)/MCL (ref 4.7–6.1)
SODIUM SERPL-SCNC: 139 MMOL/L (ref 136–145)
TIBC SERPL-MCNC: 248 UG/DL (ref 69–240)
TIBC SERPL-MCNC: 309 UG/DL (ref 250–450)
TRANSFERRIN SERPL-MCNC: 278 MG/DL (ref 174–364)
WBC # SPEC AUTO: 8.2 X10(3)/MCL (ref 4.5–11.5)

## 2023-01-20 PROCEDURE — 36415 COLL VENOUS BLD VENIPUNCTURE: CPT

## 2023-01-20 PROCEDURE — 82728 ASSAY OF FERRITIN: CPT

## 2023-01-20 PROCEDURE — 80053 COMPREHEN METABOLIC PANEL: CPT

## 2023-01-20 PROCEDURE — 85025 COMPLETE CBC W/AUTO DIFF WBC: CPT

## 2023-01-20 PROCEDURE — 83550 IRON BINDING TEST: CPT

## 2023-01-20 NOTE — TELEPHONE ENCOUNTER
Results to pt, verbalized understanding.    ----- Message from ROSELINE Calloway sent at 1/20/2023 12:00 PM CST -----  Please notify labs okay. Thanks

## 2023-01-23 ENCOUNTER — DOCUMENTATION ONLY (OUTPATIENT)
Dept: INTERNAL MEDICINE | Facility: CLINIC | Age: 43
End: 2023-01-23
Payer: MEDICAID

## 2023-01-27 ENCOUNTER — APPOINTMENT (OUTPATIENT)
Dept: LAB | Facility: HOSPITAL | Age: 43
End: 2023-01-27
Attending: NURSE PRACTITIONER
Payer: MEDICAID

## 2023-01-27 ENCOUNTER — OFFICE VISIT (OUTPATIENT)
Dept: INTERNAL MEDICINE | Facility: CLINIC | Age: 43
End: 2023-01-27
Payer: MEDICAID

## 2023-01-27 VITALS
TEMPERATURE: 98 F | SYSTOLIC BLOOD PRESSURE: 150 MMHG | HEART RATE: 76 BPM | DIASTOLIC BLOOD PRESSURE: 100 MMHG | BODY MASS INDEX: 29.31 KG/M2 | RESPIRATION RATE: 20 BRPM | WEIGHT: 193.38 LBS | HEIGHT: 68 IN

## 2023-01-27 DIAGNOSIS — Z76.89 ENCOUNTER TO ESTABLISH CARE: Primary | ICD-10-CM

## 2023-01-27 DIAGNOSIS — M79.673 HEEL PAIN: ICD-10-CM

## 2023-01-27 DIAGNOSIS — E66.3 OVERWEIGHT WITH BODY MASS INDEX (BMI) OF 29 TO 29.9 IN ADULT: ICD-10-CM

## 2023-01-27 DIAGNOSIS — R73.01 IFG (IMPAIRED FASTING GLUCOSE): ICD-10-CM

## 2023-01-27 DIAGNOSIS — G44.229 CHRONIC TENSION-TYPE HEADACHE, NOT INTRACTABLE: ICD-10-CM

## 2023-01-27 DIAGNOSIS — I10 PRIMARY HYPERTENSION: ICD-10-CM

## 2023-01-27 DIAGNOSIS — Z12.5 SCREENING FOR MALIGNANT NEOPLASM OF PROSTATE: ICD-10-CM

## 2023-01-27 DIAGNOSIS — T39.395A GASTRIC ULCER DUE TO NONSTEROIDAL ANTIINFLAMMATORY DRUG (NSAID) THERAPY: ICD-10-CM

## 2023-01-27 DIAGNOSIS — K25.9 GASTRIC ULCER DUE TO NONSTEROIDAL ANTIINFLAMMATORY DRUG (NSAID) THERAPY: ICD-10-CM

## 2023-01-27 PROBLEM — R51.9 HEADACHE: Status: ACTIVE | Noted: 2023-01-27

## 2023-01-27 PROCEDURE — 99214 PR OFFICE/OUTPT VISIT, EST, LEVL IV, 30-39 MIN: ICD-10-PCS | Mod: S$PBB,,, | Performed by: NURSE PRACTITIONER

## 2023-01-27 PROCEDURE — 1160F PR REVIEW ALL MEDS BY PRESCRIBER/CLIN PHARMACIST DOCUMENTED: ICD-10-PCS | Mod: CPTII,,, | Performed by: NURSE PRACTITIONER

## 2023-01-27 PROCEDURE — 1159F PR MEDICATION LIST DOCUMENTED IN MEDICAL RECORD: ICD-10-PCS | Mod: CPTII,,, | Performed by: NURSE PRACTITIONER

## 2023-01-27 PROCEDURE — 3008F BODY MASS INDEX DOCD: CPT | Mod: CPTII,,, | Performed by: NURSE PRACTITIONER

## 2023-01-27 PROCEDURE — 99214 OFFICE O/P EST MOD 30 MIN: CPT | Mod: PBBFAC | Performed by: NURSE PRACTITIONER

## 2023-01-27 PROCEDURE — 3080F DIAST BP >= 90 MM HG: CPT | Mod: CPTII,,, | Performed by: NURSE PRACTITIONER

## 2023-01-27 PROCEDURE — 3077F SYST BP >= 140 MM HG: CPT | Mod: CPTII,,, | Performed by: NURSE PRACTITIONER

## 2023-01-27 PROCEDURE — 3008F PR BODY MASS INDEX (BMI) DOCUMENTED: ICD-10-PCS | Mod: CPTII,,, | Performed by: NURSE PRACTITIONER

## 2023-01-27 PROCEDURE — 3077F PR MOST RECENT SYSTOLIC BLOOD PRESSURE >= 140 MM HG: ICD-10-PCS | Mod: CPTII,,, | Performed by: NURSE PRACTITIONER

## 2023-01-27 PROCEDURE — 1159F MED LIST DOCD IN RCRD: CPT | Mod: CPTII,,, | Performed by: NURSE PRACTITIONER

## 2023-01-27 PROCEDURE — 1160F RVW MEDS BY RX/DR IN RCRD: CPT | Mod: CPTII,,, | Performed by: NURSE PRACTITIONER

## 2023-01-27 PROCEDURE — 99214 OFFICE O/P EST MOD 30 MIN: CPT | Mod: S$PBB,,, | Performed by: NURSE PRACTITIONER

## 2023-01-27 PROCEDURE — 3080F PR MOST RECENT DIASTOLIC BLOOD PRESSURE >= 90 MM HG: ICD-10-PCS | Mod: CPTII,,, | Performed by: NURSE PRACTITIONER

## 2023-01-27 PROCEDURE — 4010F PR ACE/ARB THEARPY RXD/TAKEN: ICD-10-PCS | Mod: CPTII,,, | Performed by: NURSE PRACTITIONER

## 2023-01-27 PROCEDURE — 4010F ACE/ARB THERAPY RXD/TAKEN: CPT | Mod: CPTII,,, | Performed by: NURSE PRACTITIONER

## 2023-01-27 RX ORDER — LOSARTAN POTASSIUM 25 MG/1
25 TABLET ORAL DAILY
Qty: 30 TABLET | Refills: 1 | Status: SHIPPED | OUTPATIENT
Start: 2023-01-27 | End: 2023-02-10 | Stop reason: SDUPTHER

## 2023-01-27 RX ORDER — NIFEDIPINE 60 MG/1
60 TABLET, EXTENDED RELEASE ORAL NIGHTLY
Qty: 30 TABLET | Refills: 1 | Status: SHIPPED | OUTPATIENT
Start: 2023-01-27 | End: 2023-02-10 | Stop reason: DRUGHIGH

## 2023-01-27 NOTE — PATIENT INSTRUCTIONS
Stop lisinopril-HCTZ and amlodipine.  Begin taking losartan in the morning and nifedipine in the evening.

## 2023-01-27 NOTE — PROGRESS NOTES
Jimena Carpenter, ROSELINE   [facility]  [address] [address]      PATIENT NAME: Fredi Ricci  : 1980  DATE: 23  MRN: [id number]      Reason for Visit / Chief Complaint: Establish Care (Needs refills, refused vaccines)       History of Present Illness / Problem Focused Workflow     Fredi Ricci is a 42 y.o. Black or  male presents to the clinic to establish PCP in [facility], formerly seeing Melanie Ellis NP for primary care (provider no longer in [place]). PMH HTN, migraines, constipation, M[place], GERD and gastric ulcer (chronic NSAID use ). Followed by [facility] clinic. Underwent EGD and colonoscopy per Dr. Schultz in 3/2022. Has GI visit scheduled in 2023.    Reports mostly compliant with meds. BP elevated; states did not take norvasc today. Endorses headache and took fioricet around 7:15 am. Denies dizziness, blurred vision or chest pain. Does not follow dash/low sodium diet. Endorses erectile dysfunction and persistent, dry cough since taking prinzide. Headaches generally relieve with fioricet. Declines vaccines today. Denies chest pain, shortness of breath, cough, fever, dizziness, weakness, abdominal pain, nausea, vomiting, diarrhea, constipation, dysuria, depression, anxiety, SI/HI.    Review of Systems     Review of Systems     See HPI for details    Constitutional: Denies Change in appetite. Denies Chills. Denies Fever. Denies Night sweats.  Eye: Denies Blurred vision. Denies Discharge. Denies Eye pain.  ENT: Denies Decreased hearing. Denies Sore throat. Denies Swollen glands.  Respiratory: Admits Cough. Denies Shortness of breath. Denies Shortness of breath with exertion. Denies Wheezing.  Cardiovascular: Denies Chest pain at rest. Denies Chest pain with exertion. Denies Irregular heartbeat. Denies Palpitations. Denies Edema.  Gastrointestinal: Denies Abdominal pain. Denies Diarrhea.  Denies Nausea. Denies Vomiting. Denies Hematemesis or Hematochezia.  Genitourinary: Denies Dysuria. Denies Urinary frequency. Denies Urinary urgency. Denies Blood in urine.  Endocrine: Denies Cold intolerance. Denies Excessive thirst. Denies Heat intolerance. Denies Weight loss. Denies Weight gain.  Musculoskeletal: Denies Painful joints. Denies Weakness.  Integumentary: Denies Rash. Denies Itching. Denies Dry skin.  Neurologic: Denies Dizziness. Denies Fainting. Admits Headache.  Psychiatric: Denies Depression. Denies Anxiety. Denies Suicidal/Homicidal ideations.    All Other ROS: Negative except as stated in HPI.     Medical / Surgical / Social / Family History       ----------------------------  Anemia  Gastric ulcer  Hypertension     Past Surgical History:   Procedure Laterality Date    COLONOSCOPY  2022    UPPER GASTROINTESTINAL ENDOSCOPY         Social History     Socioeconomic History    Marital status: Single   Tobacco Use    Smoking status: Never    Smokeless tobacco: Never   Substance and Sexual Activity    Alcohol use: Yes     Alcohol/week: 14.0 standard drinks     Types: 14 Cans of beer per week     Comment: 2 daily    Drug use: Not Currently     Social Determinants of Health     Transportation Needs: No Transportation Needs    Lack of Transportation (Medical): No    Lack of Transportation (Non-Medical): No   Housing Stability: Low Risk     Unable to Pay for Housing in the Last Year: No    Number of Places Lived in the Last Year: 1    Unstable Housing in the Last Year: No        Family History   Problem Relation Age of Onset    Diabetes Father     Heart disease Maternal Grandfather         Medications and Allergies     Medications  Current Outpatient Medications   Medication Instructions    butalbital-acetaminophen-caffeine -40 mg (FIORICET, ESGIC) -40 mg per tablet 1 tablet, Oral, Every 6 hours PRN    ferrous sulfate 325 (65 FE) MG EC tablet 1 tablet, Oral, Daily    losartan (COZAAR) 25 mg,  "Oral, Daily    NIFEdipine (PROCARDIA-XL) 60 mg, Oral, Nightly    pantoprazole (PROTONIX) 40 mg, Oral, Daily    propranoloL (INDERAL) 40 mg, Oral, 2 times daily         Allergies  Review of patient's allergies indicates:  No Known Allergies    Physical Examination     BP (!) 150/100 (BP Location: Left arm, Patient Position: Sitting, BP Method: Large (Manual))   Pulse 76   Temp 97.9 °F (36.6 °C) (Oral)   Resp 20   Ht 5' 7.99" (1.727 m)   Wt 87.7 kg (193 lb 6.4 oz)   BMI 29.41 kg/m²     Physical Exam    General: Alert and oriented, No acute distress.  Head: Normocephalic, Atraumatic.  Eye: Pupils are equal, round and reactive to light, Extraocular movements are intact, Sclera non-icteric.  Ears/Nose/Throat: Normal, Mucosa moist,Clear.  Neck/Thyroid: Supple, Non-tender, No carotid bruit, No lymphadenopathy, No JVD, Full range of motion.  Respiratory: Clear to auscultation bilaterally; No wheezes, rales or rhonchi,Non-labored respirations, Symmetrical chest wall expansion.  Cardiovascular: Regular rate and rhythm, S1/S2 normal, No murmurs, rubs or gallops.  Gastrointestinal: Soft, Non-tender, Non-distended, Normal bowel sounds, No palpable organomegaly.  Musculoskeletal: Normal range of motion.  Integumentary: Warm, Dry, Intact, No suspicious lesions or rashes.  Extremities: No clubbing, cyanosis or edema  Neurologic: No focal deficits, Cranial Nerves II-XII are grossly intact, Motor strength normal upper and lower extremities, Sensory exam intact.  Psychiatric: Normal interaction, Coherent speech, Euthymic mood, Appropriate affect       Results     Lab Results   Component Value Date    WBC 8.2 01/20/2023    HGB 14.5 01/20/2023    HCT 45.5 01/20/2023     01/20/2023    CHOL 207 (H) 01/27/2023    TRIG 130 01/27/2023    ALT 55 01/20/2023    AST 36 (H) 01/20/2023     01/20/2023    K 3.8 01/20/2023    CREATININE 0.89 01/20/2023    BUN 12.7 01/20/2023    CO2 27 01/20/2023    TSH 0.793 04/06/2018    INR 1.08 " 06/24/2021    HGBA1C 5.1 01/27/2023         Assessment and Plan (including Health Maintenance)     Plan:     1. Encounter to establish care  Labs ordered.    2. Primary hypertension  D/C lisinopril-HCTZ d/t cough and r/o ED.  D/C amlodipine.  Rx procardia 60 mg at bedtime.  Rx losartan 25 mg in am.  Stop using [employer] when cooking.  Follow a low sodium (less than 2 grams of sodium per day), DASH diet.   Continue medications as prescribed.  Monitor blood pressure and report any consistent values greater than 140/90 and keep a log.  Maintain healthy weight with a BMI goal of <30.   Aerobic exercise for 150 minutes per week (or 5 days a week for 30 minutes each day).    - Lipid Panel; Future  - Microalbumin/Creatinine Ratio, Urine; Future  - Urinalysis, Reflex to Urine Culture; Future  - TSH; Future  - T4, Free; Future    3. Chronic tension-type headache, not intractable  Continue fioricet prn.   Avoid triggers such as bright lights, alcohol, nicotine, aged cheeses, chocolate, caffeine, foods/drinks that contain nitrates or aspartame.  Take meds as prescribed.  Lie in a quiet, dark room if possible.  Limit stress and get at least 8 hours of sleep per night.      4. Gastric ulcer due to nonsteroidal antiinflammatory drug (NSAID) therapy  Keep GI appts as scheduled.  Continue protonix as prescribed.    5. IFG (impaired fasting glucose)   on recent labs.  A1C ordered.  Avoid soda, simple sweets, and limit rice/pasta/bread/starches and consume brown options when possible.   Maintain healthy weight with BMI goal <30.   Perform aerobic exercise for 150 minutes per week (or 5 days a week for 30 minutes each day).     - Hemoglobin A1C; Future    6. Overweight with body mass index (BMI) of 29 to 29.9 in adult  BMI 29. Goal BMI <25.  Aerobic exercise 150 minutes per week.  Avoid soda, simple sugars, sweets, excessive rice, pasta, potatoes or bread.   Choose brown options when available and portion control.  Limit fast foods  and fried foods.   Choose complex carbs in moderation (ex: green, leafy vegetables, beans, oatmeal).  Eat plenty of fresh fruits and vegetables with lean meats daily.   Consider permanent healthy lifestyle changes.      Problem List Items Addressed This Visit          Neuro    Headache       Cardiac/Vascular    Primary hypertension    Relevant Orders    Lipid Panel (Completed)    Microalbumin/Creatinine Ratio, Urine (Completed)    Urinalysis, Reflex to Urine Culture (Completed)    TSH    T4, Free       Endocrine    IFG (impaired fasting glucose)    Relevant Orders    Hemoglobin A1C (Completed)    Overweight with body mass index (BMI) of 29 to 29.9 in adult       GI    Gastric ulcer due to nonsteroidal antiinflammatory drug (NSAID) therapy       Other    Encounter to establish care - Primary     Other Visit Diagnoses       Heel pain        Screening for malignant neoplasm of prostate        Relevant Orders    PSA, Screening              Health Maintenance Due   Topic Date Due    Hepatitis C Screening  Never done    COVID-19 Vaccine (1) Never done    HIV Screening  Never done    Influenza Vaccine (1) Never done     RTC 4 weeks and prn. Labs must be completed prior to visit.       Signature:  ROSELINE Quintanilla  [facility]  [address] [address]

## 2023-02-03 ENCOUNTER — HOSPITAL ENCOUNTER (EMERGENCY)
Facility: HOSPITAL | Age: 43
Discharge: HOME | End: 2023-02-03
Attending: FAMILY MEDICINE
Payer: MEDICAID

## 2023-02-03 VITALS
HEIGHT: 69 IN | SYSTOLIC BLOOD PRESSURE: 144 MMHG | BODY MASS INDEX: 28.73 KG/M2 | RESPIRATION RATE: 19 BRPM | WEIGHT: 194 LBS | OXYGEN SATURATION: 97 % | HEART RATE: 70 BPM | TEMPERATURE: 98 F | DIASTOLIC BLOOD PRESSURE: 103 MMHG

## 2023-02-03 DIAGNOSIS — R07.9 CHEST PAIN: ICD-10-CM

## 2023-02-03 DIAGNOSIS — R07.89 ATYPICAL CHEST PAIN: Primary | ICD-10-CM

## 2023-02-03 DIAGNOSIS — Z63.79 STRESSFUL LIFE EVENTS AFFECTING FAMILY AND HOUSEHOLD: ICD-10-CM

## 2023-02-03 LAB
ALBUMIN SERPL-MCNC: 3.9 G/DL (ref 3.5–5)
ALBUMIN/GLOB SERPL: 1.3 RATIO (ref 1.1–2)
ALP SERPL-CCNC: 71 UNIT/L (ref 40–150)
ALT SERPL-CCNC: 26 UNIT/L (ref 0–55)
AST SERPL-CCNC: 15 UNIT/L (ref 5–34)
BASOPHILS # BLD AUTO: 0.04 X10(3)/MCL (ref 0–0.2)
BASOPHILS NFR BLD AUTO: 0.5 %
BILIRUBIN DIRECT+TOT PNL SERPL-MCNC: 0.5 MG/DL
BUN SERPL-MCNC: 14.6 MG/DL (ref 8.9–20.6)
CALCIUM SERPL-MCNC: 9.1 MG/DL (ref 8.4–10.2)
CHLORIDE SERPL-SCNC: 105 MMOL/L (ref 98–107)
CO2 SERPL-SCNC: 26 MMOL/L (ref 22–29)
CREAT SERPL-MCNC: 0.82 MG/DL (ref 0.73–1.18)
EOSINOPHIL # BLD AUTO: 0.58 X10(3)/MCL (ref 0–0.9)
EOSINOPHIL NFR BLD AUTO: 7.2 %
ERYTHROCYTE [DISTWIDTH] IN BLOOD BY AUTOMATED COUNT: 13.2 % (ref 11.5–17)
GFR SERPLBLD CREATININE-BSD FMLA CKD-EPI: >60 MLS/MIN/1.73/M2
GLOBULIN SER-MCNC: 3.1 GM/DL (ref 2.4–3.5)
GLUCOSE SERPL-MCNC: 106 MG/DL (ref 74–100)
HCT VFR BLD AUTO: 43 % (ref 42–52)
HGB BLD-MCNC: 14.2 GM/DL (ref 14–18)
IMM GRANULOCYTES # BLD AUTO: 0.02 X10(3)/MCL (ref 0–0.04)
IMM GRANULOCYTES NFR BLD AUTO: 0.2 %
LYMPHOCYTES # BLD AUTO: 2.75 X10(3)/MCL (ref 0.6–4.6)
LYMPHOCYTES NFR BLD AUTO: 34.3 %
MCH RBC QN AUTO: 28.7 PG
MCHC RBC AUTO-ENTMCNC: 33 MG/DL (ref 33–36)
MCV RBC AUTO: 86.9 FL (ref 80–94)
MONOCYTES # BLD AUTO: 0.5 X10(3)/MCL (ref 0.1–1.3)
MONOCYTES NFR BLD AUTO: 6.2 %
NEUTROPHILS # BLD AUTO: 4.13 X10(3)/MCL (ref 2.1–9.2)
NEUTROPHILS NFR BLD AUTO: 51.6 %
NRBC BLD AUTO-RTO: 0 %
PLATELET # BLD AUTO: 269 X10(3)/MCL (ref 130–400)
PMV BLD AUTO: 11.2 FL (ref 7.4–10.4)
POTASSIUM SERPL-SCNC: 3.9 MMOL/L (ref 3.5–5.1)
PROT SERPL-MCNC: 7 GM/DL (ref 6.4–8.3)
RBC # BLD AUTO: 4.95 X10(6)/MCL (ref 4.7–6.1)
SODIUM SERPL-SCNC: 139 MMOL/L (ref 136–145)
TROPONIN I SERPL-MCNC: <0.01 NG/ML (ref 0–0.04)
WBC # SPEC AUTO: 8 X10(3)/MCL (ref 4.5–11.5)

## 2023-02-03 PROCEDURE — 63600175 PHARM REV CODE 636 W HCPCS: Performed by: FAMILY MEDICINE

## 2023-02-03 PROCEDURE — 85025 COMPLETE CBC W/AUTO DIFF WBC: CPT | Performed by: FAMILY MEDICINE

## 2023-02-03 PROCEDURE — 80053 COMPREHEN METABOLIC PANEL: CPT | Performed by: FAMILY MEDICINE

## 2023-02-03 PROCEDURE — 99285 EMERGENCY DEPT VISIT HI MDM: CPT | Mod: 25

## 2023-02-03 PROCEDURE — 84484 ASSAY OF TROPONIN QUANT: CPT | Performed by: FAMILY MEDICINE

## 2023-02-03 PROCEDURE — 93005 ELECTROCARDIOGRAM TRACING: CPT

## 2023-02-03 PROCEDURE — 25000003 PHARM REV CODE 250: Performed by: FAMILY MEDICINE

## 2023-02-03 PROCEDURE — 96372 THER/PROPH/DIAG INJ SC/IM: CPT | Performed by: FAMILY MEDICINE

## 2023-02-03 RX ORDER — HYDROXYZINE PAMOATE 50 MG/1
50 CAPSULE ORAL EVERY 6 HOURS PRN
Qty: 15 CAPSULE | Refills: 0 | Status: SHIPPED | OUTPATIENT
Start: 2023-02-03 | End: 2023-02-10 | Stop reason: SDUPTHER

## 2023-02-03 RX ORDER — KETOROLAC TROMETHAMINE 30 MG/ML
30 INJECTION, SOLUTION INTRAMUSCULAR; INTRAVENOUS
Status: COMPLETED | OUTPATIENT
Start: 2023-02-03 | End: 2023-02-03

## 2023-02-03 RX ORDER — CLONIDINE HYDROCHLORIDE 0.1 MG/1
0.2 TABLET ORAL
Status: COMPLETED | OUTPATIENT
Start: 2023-02-03 | End: 2023-02-03

## 2023-02-03 RX ADMIN — CLONIDINE HYDROCHLORIDE 0.2 MG: 0.1 TABLET ORAL at 01:02

## 2023-02-03 RX ADMIN — KETOROLAC TROMETHAMINE 30 MG: 30 INJECTION, SOLUTION INTRAMUSCULAR; INTRAVENOUS at 01:02

## 2023-02-03 NOTE — DISCHARGE INSTRUCTIONS
It is important that you follow up with your primary care provider or specialist if indicated for further evaluation, workup, and treatment as necessary.  The exam and treatment you received in emergency department was for an urgent problem and not intended as complete care. If your symptoms, become worse or you do not improve and you are unable to reach your health care provider, you should return to the Emergency department . The emergency department provider  has provided a preliminary interpretation of all your studies. A final interpretation may be done after you are discharged. If a change in your diagnosis or treatment is needed, we will contact you. It is critical that we have a current phone number for you.

## 2023-02-03 NOTE — ED PROVIDER NOTES
"Encounter Date: 2/3/2023       History     Chief Complaint   Patient presents with    Chest Pain     Chest pain x3 days.HTN in triage     42-year-old male with history of hypertension presents to the ED with complaint of chest pain.  Onset 3 days ago.  Patient reports chest pain is located on left side of chest, nonradiating.  Patient reports "has been dealing with a lot of family stress " patient reports he is compliant with his blood pressure medications and thinks his blood pressure is high due to stress.  Patient denies shortness of breath, headache, fever, chills, weakness, dizziness.  Patient reports nothing makes it chest pain better or worse.    The history is provided by the patient. No  was used.   Review of patient's allergies indicates:  No Known Allergies  Past Medical History:   Diagnosis Date    Anemia     Gastric ulcer     Hypertension      Past Surgical History:   Procedure Laterality Date    COLONOSCOPY  2022    UPPER GASTROINTESTINAL ENDOSCOPY       Family History   Problem Relation Age of Onset    Diabetes Father     Heart disease Maternal Grandfather      Social History     Tobacco Use    Smoking status: Never    Smokeless tobacco: Never   Substance Use Topics    Alcohol use: Yes     Alcohol/week: 14.0 standard drinks     Types: 14 Cans of beer per week     Comment: 2 daily    Drug use: Not Currently     Review of Systems   Constitutional:  Negative for chills, fatigue and fever.   HENT:  Negative for sore throat.    Respiratory:  Negative for cough and shortness of breath.    Cardiovascular:  Positive for chest pain. Negative for palpitations and leg swelling.   Gastrointestinal:  Negative for nausea.   Genitourinary:  Negative for dysuria.   Musculoskeletal:  Negative for back pain.   Skin:  Negative for rash.   Neurological:  Negative for tremors, syncope and weakness.   Hematological:  Does not bruise/bleed easily.   Psychiatric/Behavioral:  Positive for sleep disturbance " (Due to family stress per patient).      Physical Exam     Initial Vitals [02/03/23 0049]   BP Pulse Resp Temp SpO2   (!) 180/121 72 20 97.5 °F (36.4 °C) 99 %      MAP       --         Physical Exam    Nursing note and vitals reviewed.  Constitutional: He appears well-developed and well-nourished. No distress.   HENT:   Head: Normocephalic and atraumatic.   Eyes: Conjunctivae are normal.   Cardiovascular:  Normal heart sounds and intact distal pulses.           Pulmonary/Chest: Breath sounds normal. No respiratory distress. He has no wheezes.   Left anterior chest wall tenderness to palpation which reproduces chest pain per patient.    Abdominal: Abdomen is soft. Bowel sounds are normal. There is no abdominal tenderness. There is no rebound and no guarding.   Musculoskeletal:         General: Normal range of motion.     Neurological: He is alert and oriented to person, place, and time. He has normal strength. No sensory deficit. Gait normal. GCS score is 15. GCS eye subscore is 4. GCS verbal subscore is 5. GCS motor subscore is 6.   Skin: Skin is warm and dry. Capillary refill takes less than 2 seconds.   Psychiatric: He has a normal mood and affect. His behavior is normal. Judgment and thought content normal.       ED Course   Procedures  Labs Reviewed   COMPREHENSIVE METABOLIC PANEL - Abnormal; Notable for the following components:       Result Value    Glucose Level 106 (*)     All other components within normal limits   CBC WITH DIFFERENTIAL - Abnormal; Notable for the following components:    MPV 11.2 (*)     All other components within normal limits   TROPONIN I - Normal   CBC W/ AUTO DIFFERENTIAL    Narrative:     The following orders were created for panel order CBC auto differential.  Procedure                               Abnormality         Status                     ---------                               -----------         ------                     CBC with Differential[[id number]]        Abnormal             Final result                 Please view results for these tests on the individual orders.     EKG Readings: (Independently Interpreted)   Initial Reading: No STEMI. Rhythm: Normal Sinus Rhythm. Heart Rate: 75. Ectopy: No Ectopy. Conduction: Normal. ST Segments: Normal ST Segments. T Waves: Normal.     Imaging Results              X-Ray Chest PA And Lateral (Preliminary result)  Result time 23 02:32:48      ED Interpretation by Felicita Son MD (23 02:32:48, [facility])    No acute cardiopulmonary process.                                     Medications   ketorolac injection 30 mg (30 mg Intramuscular Given 2/3/23 0121)   cloNIDine tablet 0.2 mg (0.2 mg Oral Given 2/3/23 0121)     Medical Decision Making:   ED Management:  ALEXIS Score for NSTEMI    Age > 65:  0  ASA daily (at least the last 7 days):  1  Known CAD (> 50% stenosis): 0  > 3 CAD Risk Factors: 0  2 episodes of Angina in 24 hrs: 0  ST changes on EK  Elevated Cardiac markers:  0    Total: 1        HEART Score    History (Slightly suspicious; 0 points; Moderately suspicious; 1 point; Highly suspicious; 2 points)    - 1  EKG (Normal; 0 points; Non specific Repo changes; 1 point; Significant ST deviation; 2 points)        -0  Age: (< 45 0 points; 45-64; 1 point; > 65; 2 points)                                                                               -0  Risk Factors: (None; 0 points; 1-2 factors; 1 point; > 2 factors; 2 points)                                              -1  Initial Troponin ( Normal; 0 point; 1-3X normal limit; 1 point; > 3 X normal limit; 2 points)                    -0    Total: 2      0-3: Low Risk  4-6: Admission for stress vs Cath)  >6: High Risk ( Cath)      Patient presents with atypical chest pain.  Physical exam pertinent for reproducible chest pain.  Patient reports he is feeling better treatment in ED. reviewed all labs and imaging with patient at bedside. The  patient is resting comfortably and is in no acute distress.  Fredi Ricci states that symptoms have improved after treatment within ER. Discussed test results, shared treatment plan, specific conditions for return, and importance of follow up with patient and family. The patient understands and agrees with the plan as discussed. Answered all patient questions at this time.  Fredi Ricci has remained hemodynamically stable throughout the ED course and is appropriate for discharge home.     This patient presents with chest pain that is very unlikely angina or acute coronary syndrome. The emergency department evaluation has not identified any cause for suspicion that this chest pain has a cardiac etiology. Based on their history, EKG (which showed no evidence of ischemia or infarction) and imaging, in addition to the patient's physical exam, I see no evidence at this time for a malignant etiology for the patient's chest pain. There is no acute evidence for pulmonary embolus, acute myocardial infarction, pneumothorax, Boerhaeve syndrome, cardiac tamponade, thoracic artery dissection, or any other emergent cardiac, pulmonary or aortic pathology. Given the low pre-test probability for cardiac etiology of chest pain and the absence of any sign of ischemia or infarction, discharge for outpatient follow-up and further evaluation is reasonable.    I have explained to the patient that even though a cardiac problem is very unlikely, follow-up and further testing is required to reduce further the already small uncertainty that exists. Other life-threatening diagnoses have been considered. The patient understands the need to return immediately if their symptoms worsen or they develop any new symptoms, and not to engage in any significant exertional activity until follow-up is obtained.                            Clinical Impression:   Final diagnoses:  [R07.89] Atypical chest pain (Primary)  [Z63.79] Stressful life events  affecting family and household  [R07.9] Chest pain        ED Disposition Condition    Discharge Stable          ED Prescriptions       Medication Sig Dispense Start Date End Date Auth. Provider    hydrOXYzine pamoate (VISTARIL) 50 MG Cap Take 1 capsule (50 mg total) by mouth every 6 (six) hours as needed (anxiety). 15 capsule 2/3/2023 -- Felicita Son MD          Follow-up Information       Follow up With Specialties Details Why Contact Info    ROSELINE Edmond Nurse Practitioner Schedule an appointment as soon as possible for a visit in 3 days  [address] [facility]  Internal Medicine Clinic  [facility] [address]  [phone]      [facility] Emergency [facility]  As needed, If symptoms worsen [facility] [facility] [address] [phone]             Felicita Son MD  02/03/23 0354

## 2023-02-10 ENCOUNTER — OFFICE VISIT (OUTPATIENT)
Dept: INTERNAL MEDICINE | Facility: CLINIC | Age: 43
End: 2023-02-10
Payer: MEDICAID

## 2023-02-10 VITALS
HEIGHT: 69 IN | HEART RATE: 84 BPM | DIASTOLIC BLOOD PRESSURE: 90 MMHG | TEMPERATURE: 98 F | RESPIRATION RATE: 20 BRPM | SYSTOLIC BLOOD PRESSURE: 140 MMHG | BODY MASS INDEX: 28.61 KG/M2 | WEIGHT: 193.19 LBS

## 2023-02-10 DIAGNOSIS — Z00.00 WELLNESS EXAMINATION: ICD-10-CM

## 2023-02-10 DIAGNOSIS — R73.01 IFG (IMPAIRED FASTING GLUCOSE): Chronic | ICD-10-CM

## 2023-02-10 DIAGNOSIS — I10 PRIMARY HYPERTENSION: Primary | Chronic | ICD-10-CM

## 2023-02-10 DIAGNOSIS — F41.9 ANXIETY: Chronic | ICD-10-CM

## 2023-02-10 PROBLEM — R51.9 HEADACHE: Chronic | Status: ACTIVE | Noted: 2023-01-27

## 2023-02-10 PROBLEM — E66.3 OVERWEIGHT WITH BODY MASS INDEX (BMI) OF 28 TO 28.9 IN ADULT: Chronic | Status: ACTIVE | Noted: 2023-01-27

## 2023-02-10 PROCEDURE — 4010F ACE/ARB THERAPY RXD/TAKEN: CPT | Mod: CPTII,,, | Performed by: NURSE PRACTITIONER

## 2023-02-10 PROCEDURE — 3008F BODY MASS INDEX DOCD: CPT | Mod: CPTII,,, | Performed by: NURSE PRACTITIONER

## 2023-02-10 PROCEDURE — 1159F MED LIST DOCD IN RCRD: CPT | Mod: CPTII,,, | Performed by: NURSE PRACTITIONER

## 2023-02-10 PROCEDURE — 1160F RVW MEDS BY RX/DR IN RCRD: CPT | Mod: CPTII,,, | Performed by: NURSE PRACTITIONER

## 2023-02-10 PROCEDURE — 3080F DIAST BP >= 90 MM HG: CPT | Mod: CPTII,,, | Performed by: NURSE PRACTITIONER

## 2023-02-10 PROCEDURE — 3077F SYST BP >= 140 MM HG: CPT | Mod: CPTII,,, | Performed by: NURSE PRACTITIONER

## 2023-02-10 PROCEDURE — 3066F NEPHROPATHY DOC TX: CPT | Mod: CPTII,,, | Performed by: NURSE PRACTITIONER

## 2023-02-10 PROCEDURE — 3077F PR MOST RECENT SYSTOLIC BLOOD PRESSURE >= 140 MM HG: ICD-10-PCS | Mod: CPTII,,, | Performed by: NURSE PRACTITIONER

## 2023-02-10 PROCEDURE — 3008F PR BODY MASS INDEX (BMI) DOCUMENTED: ICD-10-PCS | Mod: CPTII,,, | Performed by: NURSE PRACTITIONER

## 2023-02-10 PROCEDURE — 3066F PR DOCUMENTATION OF TREATMENT FOR NEPHROPATHY: ICD-10-PCS | Mod: CPTII,,, | Performed by: NURSE PRACTITIONER

## 2023-02-10 PROCEDURE — 99214 OFFICE O/P EST MOD 30 MIN: CPT | Mod: PBBFAC | Performed by: NURSE PRACTITIONER

## 2023-02-10 PROCEDURE — 99214 PR OFFICE/OUTPT VISIT, EST, LEVL IV, 30-39 MIN: ICD-10-PCS | Mod: 25,S$PBB,, | Performed by: NURSE PRACTITIONER

## 2023-02-10 PROCEDURE — 3080F PR MOST RECENT DIASTOLIC BLOOD PRESSURE >= 90 MM HG: ICD-10-PCS | Mod: CPTII,,, | Performed by: NURSE PRACTITIONER

## 2023-02-10 PROCEDURE — 3061F PR NEG MICROALBUMINURIA RESULT DOCUMENTED/REVIEW: ICD-10-PCS | Mod: CPTII,,, | Performed by: NURSE PRACTITIONER

## 2023-02-10 PROCEDURE — 3061F NEG MICROALBUMINURIA REV: CPT | Mod: CPTII,,, | Performed by: NURSE PRACTITIONER

## 2023-02-10 PROCEDURE — 1160F PR REVIEW ALL MEDS BY PRESCRIBER/CLIN PHARMACIST DOCUMENTED: ICD-10-PCS | Mod: CPTII,,, | Performed by: NURSE PRACTITIONER

## 2023-02-10 PROCEDURE — 99396 PR PREVENTIVE VISIT,EST,40-64: ICD-10-PCS | Mod: S$PBB,,, | Performed by: NURSE PRACTITIONER

## 2023-02-10 PROCEDURE — 99396 PREV VISIT EST AGE 40-64: CPT | Mod: S$PBB,,, | Performed by: NURSE PRACTITIONER

## 2023-02-10 PROCEDURE — 1159F PR MEDICATION LIST DOCUMENTED IN MEDICAL RECORD: ICD-10-PCS | Mod: CPTII,,, | Performed by: NURSE PRACTITIONER

## 2023-02-10 PROCEDURE — 4010F PR ACE/ARB THEARPY RXD/TAKEN: ICD-10-PCS | Mod: CPTII,,, | Performed by: NURSE PRACTITIONER

## 2023-02-10 PROCEDURE — 99214 OFFICE O/P EST MOD 30 MIN: CPT | Mod: 25,S$PBB,, | Performed by: NURSE PRACTITIONER

## 2023-02-10 RX ORDER — NIFEDIPINE 90 MG/1
90 TABLET, EXTENDED RELEASE ORAL DAILY
Qty: 30 TABLET | Refills: 1 | Status: SHIPPED | OUTPATIENT
Start: 2023-02-10 | End: 2023-03-30 | Stop reason: SDUPTHER

## 2023-02-10 RX ORDER — LOSARTAN POTASSIUM 25 MG/1
25 TABLET ORAL DAILY
Qty: 90 TABLET | Refills: 1 | Status: SHIPPED | OUTPATIENT
Start: 2023-02-10 | End: 2024-02-16 | Stop reason: SDUPTHER

## 2023-02-10 RX ORDER — HYDROXYZINE PAMOATE 50 MG/1
50 CAPSULE ORAL EVERY 6 HOURS PRN
Qty: 45 CAPSULE | Refills: 1 | Status: SHIPPED | OUTPATIENT
Start: 2023-02-10 | End: 2023-03-30 | Stop reason: ALTCHOICE

## 2023-02-10 RX ORDER — HYDROXYZINE PAMOATE 50 MG/1
50 CAPSULE ORAL EVERY 6 HOURS PRN
Qty: 45 CAPSULE | Refills: 1 | Status: SHIPPED | OUTPATIENT
Start: 2023-02-10 | End: 2023-02-10 | Stop reason: SDUPTHER

## 2023-02-10 NOTE — PROGRESS NOTES
ROSELINE Quintanilla   [facility]  [address] [facility] [address]      PATIENT NAME: Fredi Ricci  : 1980  DATE: 2/10/23  MRN: [id number]      Reason for Visit / Chief Complaint: Follow-up (Lab results, needs refills, denies CP, refused flu vaccine)       History of Present Illness / Problem Focused Workflow     Fredi Ricci is a 42 y.o. Black or  male presents to the clinic for HTN and ED f/u. PMH HTN, IFG, migraines, constipation, MALLORIE, GERD and gastric ulcer (chronic NSAID use ). Followed by [facility] GI clinic. Underwent EGD and colonoscopy per Dr. Schultz in 3/2022. Has GI visit scheduled in 2023.     Pt reported to ED on 2/3/23 w/ c/o non radiating left chest pain. Workup was unremarkable and pain was reproducible with tenderness upon palpation. BP was elevated at that time. Pt was prescribed vistaril prn.     Today, pt reports med compliance. BP borderline today. Headaches have improved with better BP control. Denies any further c/o CP. Reports family stress. Has taken vistaril with improvement of stress/anxiety. Has been  attempting low sodium diet; has stopped using accent with cooking. Labs reviewed with pt. Drinks 2 cans strawberitas daily.  Declines flu vaccine today. Denies chest pain, shortness of breath, cough, fever, headache, dizziness, weakness, abdominal pain, nausea, vomiting, diarrhea, constipation, dysuria, depression, SI/HI.      Review of Systems     Review of Systems     See HPI for details    Constitutional: Denies Change in appetite. Denies Chills. Denies Fever. Denies Night sweats.  Eye: Denies Blurred vision. Denies Discharge. Denies Eye pain.  ENT: Denies Decreased hearing. Denies Sore throat. Denies Swollen glands.  Respiratory: Denies Cough. Denies Shortness of breath. Denies Shortness of breath with exertion. Denies Wheezing.  Cardiovascular: DeniesChest pain at rest. Denies Chest  pain with exertion. Denies Irregular heartbeat. Denies Palpitations. Denies Edema.  Gastrointestinal: Denies Abdominal pain. Denies Diarrhea. Denies Nausea. Denies Vomiting. Denies Hematemesis or Hematochezia.  Genitourinary: Denies Dysuria. Denies Urinary frequency. Denies Urinary urgency. Denies Blood in urine.  Endocrine: Denies Cold intolerance. Denies Excessive thirst. Denies Heat intolerance. Denies Weight loss. Denies Weight gain.  Musculoskeletal: Denies Painful joints. Denies Weakness.  Integumentary: Denies Rash. Denies Itching. Denies Dry skin.  Neurologic: Denies Dizziness. Denies Fainting. Admits Headache.  Psychiatric: Denies Depression. Admits Anxiety. Denies Suicidal/Homicidal ideations.    All Other ROS: Negative except as stated in HPI.     Medical / Surgical / Social / Family History       ----------------------------  Anemia  Gastric ulcer  Hypertension     Past Surgical History:   Procedure Laterality Date    COLONOSCOPY  2022    UPPER GASTROINTESTINAL ENDOSCOPY         Social History     Socioeconomic History    Marital status: Single   Tobacco Use    Smoking status: Never    Smokeless tobacco: Never   Substance and Sexual Activity    Alcohol use: Yes     Alcohol/week: 14.0 standard drinks     Types: 14 Cans of beer per week     Comment: 2 daily    Drug use: Not Currently     Social Determinants of Health     Transportation Needs: No Transportation Needs    Lack of Transportation (Medical): No    Lack of Transportation (Non-Medical): No   Housing Stability: Low Risk     Unable to Pay for Housing in the Last Year: No    Number of Places Lived in the Last Year: 1    Unstable Housing in the Last Year: No        Family History   Problem Relation Age of Onset    Diabetes Father     Heart disease Maternal Grandfather         Medications and Allergies     Medications  Current Outpatient Medications   Medication Instructions    butalbital-acetaminophen-caffeine -40 mg (FIORICET, ESGIC) -40  "mg per tablet 1 tablet, Oral, Every 6 hours PRN    ferrous sulfate 325 (65 FE) MG EC tablet 1 tablet, Oral, Daily    hydrOXYzine pamoate (VISTARIL) 50 mg, Oral, Every 6 hours PRN    losartan (COZAAR) 25 mg, Oral, Daily    NIFEdipine (PROCARDIA-XL) 90 mg, Oral, Daily    pantoprazole (PROTONIX) 40 mg, Oral, Daily    propranoloL (INDERAL) 40 mg, Oral, 2 times daily         Allergies  Review of patient's allergies indicates:  No Known Allergies    Physical Examination     BP (!) 140/90 (BP Location: Left arm, Patient Position: Sitting, BP Method: Small (Manual))   Pulse 84   Temp 97.7 °F (36.5 °C) (Oral)   Resp 20   Ht 5' 9.02" (1.753 m)   Wt 87.6 kg (193 lb 3.2 oz)   BMI 28.52 kg/m²     Physical Exam    General: Alert and oriented, No acute distress.  Head: Normocephalic, Atraumatic.  Eye: Pupils are equal, round and reactive to light, Extraocular movements are intact, Sclera non-icteric.  Ears/Nose/Throat: Normal, Mucosa moist,Clear.  Neck/Thyroid: Supple, Non-tender, No carotid bruit, No lymphadenopathy, No JVD, Full range of motion.  Respiratory: Clear to auscultation bilaterally; No wheezes, rales or rhonchi,Non-labored respirations, Symmetrical chest wall expansion.  Cardiovascular: Regular rate and rhythm, S1/S2 normal, No murmurs, rubs or gallops.  Gastrointestinal: Soft, Non-tender, Non-distended, Normal bowel sounds, No palpable organomegaly.  Musculoskeletal: Normal range of motion.  Integumentary: Warm, Dry, Intact, No suspicious lesions or rashes.  Extremities: No clubbing, cyanosis or edema  Neurologic: No focal deficits, Cranial Nerves II-XII are grossly intact, Motor strength normal upper and lower extremities, Sensory exam intact.  Psychiatric: Normal interaction, Coherent speech, Euthymic mood, Appropriate affect       Results     Lab Results   Component Value Date    WBC 8.0 02/03/2023    HGB 14.2 02/03/2023    HCT 43.0 02/03/2023     02/03/2023    CHOL 207 (H) 01/27/2023    TRIG 130 " 01/27/2023    ALT 26 02/03/2023    AST 15 02/03/2023     02/03/2023    K 3.9 02/03/2023    CREATININE 0.82 02/03/2023    BUN 14.6 02/03/2023    CO2 26 02/03/2023    TSH 0.634 01/27/2023    PSA 0.80 01/27/2023    INR 1.08 06/24/2021    HGBA1C 5.1 01/27/2023     Narrative & Impression  EXAMINATION:  Chest two views     CLINICAL HISTORY:  Chest pain     COMPARISON:  09/12/2022     FINDINGS:  Cardiac silhouette is normal size.  Central vessels are within normal limits.  No confluent airspace disease.  No visible pneumothorax or pleural effusion.  No acute osseous abnormality     Impression:     No acute cardiopulmonary process        Electronically signed by: Bert Mancini MD  Date:                                            02/03/2023  Time:                                           05:48    Narrative  Performed by: [employer]  Test Reason : R07.9,     Vent. Rate : 075 BPM     Atrial Rate : 075 BPM      P-R Int : 188 ms          QRS Dur : 084 ms       QT Int : 390 ms       P-R-T Axes : 030 027 033 degrees      QTc Int : 435 ms     Normal sinus rhythm   Normal ECG   When compared with ECG of 12-MAY-2022 20:01,   No significant change was found   Confirmed by Jose Luis De MD ([id number]) on 2/3/2023 11:36:18 AM     Referred By: [email or web address]    SELF           Confirmed By:Jose Luis De MD      Latest Reference Range & Units 02/03/23 01:17   Troponin I 0.000 - 0.045 ng/mL <0.010       Assessment and Plan (including Health Maintenance)     Plan:     1. Wellness examination  Prostate Cancer Screening - No prior screening. PSA ordered. Follow up annually.  Colon Cancer Screening - Begin at age 45.  Eye Exam - Last Eye Exam 11/2022 at [facility].  Dental Exam - 2022 at [facility]. Has top partial.  Vaccinations: Flu - declines / Covid - states took at [facility] / Pneumonia - declines / Tetanus - UTD (8/26/14)  Labwork - UTD     2. Primary hypertension  Borderline.  Increase procardia to 90 mg.  Continue  losartan.  Follow a low sodium (less than 2 grams of sodium per day), DASH diet.   Continue medications as prescribed.  Monitor blood pressure and report any consistent values greater than 140/90 and keep a log.  Maintain healthy weight with a BMI goal of <30.   Aerobic exercise for 150 minutes per week (or 5 days a week for 30 minutes each day).    - losartan (COZAAR) 25 MG tablet; Take 1 tablet (25 mg total) by mouth once daily.  Dispense: 90 tablet; Refill: 1  - NIFEdipine (PROCARDIA-XL) 90 MG (OSM) 24 hr tablet; Take 1 tablet (90 mg total) by mouth once daily.  Dispense: 30 tablet; Refill: 1    3. Anxiety  Refilled vistaril.  Take meds as prescribed.  Practice deep breathing or abdominal breathing exercises when anxiety occurs.  Exercise daily. Get sunlight daily.  Avoid caffeine, alcohol and stimulants.  Do not use illicit drugs.  Practice positive phrases and repeat throughout the day, yoga, lavender scents or Chamomile tea will help anxiety.  Set healthy boundaries, avoid people and conversations that increase stress.  Reports any symptoms of suicidal or homicidal ideations immediately, go to nearest emergency room.    - hydrOXYzine pamoate (VISTARIL) 50 MG Cap; Take 1 capsule (50 mg total) by mouth every 6 (six) hours as needed (anxiety).  Dispense: 45 capsule; Refill: 1    4. IFG (impaired fasting glucose)  Fasting glucose 117.  A1C 5.1.   Avoid soda, simple sweets, and limit rice/pasta/bread/starches and consume brown options when possible.   Maintain healthy weight with BMI goal <30.   Perform aerobic exercise for 150 minutes per week (or 5 days a week for 30 minutes each day).     Problem List Items Addressed This Visit          Psychiatric    Anxiety (Chronic)    Relevant Medications    hydrOXYzine pamoate (VISTARIL) 50 MG Cap       Cardiac/Vascular    Primary hypertension - Primary (Chronic)    Relevant Medications    losartan (COZAAR) 25 MG tablet    NIFEdipine (PROCARDIA-XL) 90 MG (OSM) 24 hr tablet        Endocrine    IFG (impaired fasting glucose) (Chronic)       Other    Wellness examination         Health Maintenance Due   Topic Date Due    Hepatitis C Screening  Never done    COVID-19 Vaccine (1) Never done    HIV Screening  Never done    Influenza Vaccine (1) Never done       Follow up in about 4 weeks (around 3/10/2023) for BP Check, Anxiety.        Signature:  ROSELINE Quintanilla  [facility]  [address] [facility] [address]

## 2023-03-01 ENCOUNTER — HOSPITAL ENCOUNTER (EMERGENCY)
Facility: HOSPITAL | Age: 43
Discharge: HOME | End: 2023-03-01
Attending: STUDENT IN AN ORGANIZED HEALTH CARE EDUCATION/TRAINING PROGRAM
Payer: MEDICAID

## 2023-03-01 VITALS
WEIGHT: 185.19 LBS | TEMPERATURE: 98 F | SYSTOLIC BLOOD PRESSURE: 125 MMHG | BODY MASS INDEX: 29.07 KG/M2 | HEART RATE: 80 BPM | OXYGEN SATURATION: 99 % | DIASTOLIC BLOOD PRESSURE: 88 MMHG | RESPIRATION RATE: 18 BRPM | HEIGHT: 67 IN

## 2023-03-01 DIAGNOSIS — R07.9 CHEST PAIN, UNSPECIFIED TYPE: Primary | ICD-10-CM

## 2023-03-01 LAB
ALBUMIN SERPL-MCNC: 4.1 G/DL (ref 3.5–5)
ALBUMIN/GLOB SERPL: 1.2 RATIO (ref 1.1–2)
ALP SERPL-CCNC: 71 UNIT/L (ref 40–150)
ALT SERPL-CCNC: 24 UNIT/L (ref 0–55)
AST SERPL-CCNC: 19 UNIT/L (ref 5–34)
BASOPHILS # BLD AUTO: 0.02 X10(3)/MCL (ref 0–0.2)
BASOPHILS NFR BLD AUTO: 0.2 %
BILIRUBIN DIRECT+TOT PNL SERPL-MCNC: 0.3 MG/DL
BUN SERPL-MCNC: 7.5 MG/DL (ref 8.9–20.6)
CALCIUM SERPL-MCNC: 9.2 MG/DL (ref 8.4–10.2)
CHLORIDE SERPL-SCNC: 109 MMOL/L (ref 98–107)
CO2 SERPL-SCNC: 25 MMOL/L (ref 22–29)
CREAT SERPL-MCNC: 0.81 MG/DL (ref 0.73–1.18)
EOSINOPHIL # BLD AUTO: 0.59 X10(3)/MCL (ref 0–0.9)
EOSINOPHIL NFR BLD AUTO: 7.3 %
ERYTHROCYTE [DISTWIDTH] IN BLOOD BY AUTOMATED COUNT: 13.1 % (ref 11.5–17)
FLUAV AG UPPER RESP QL IA.RAPID: NOT DETECTED
FLUBV AG UPPER RESP QL IA.RAPID: NOT DETECTED
GFR SERPLBLD CREATININE-BSD FMLA CKD-EPI: >60 MLS/MIN/1.73/M2
GLOBULIN SER-MCNC: 3.3 GM/DL (ref 2.4–3.5)
GLUCOSE SERPL-MCNC: 98 MG/DL (ref 74–100)
HCT VFR BLD AUTO: 42.7 % (ref 42–52)
HGB BLD-MCNC: 14.4 G/DL (ref 14–18)
IMM GRANULOCYTES # BLD AUTO: 0.02 X10(3)/MCL (ref 0–0.04)
IMM GRANULOCYTES NFR BLD AUTO: 0.2 %
LYMPHOCYTES # BLD AUTO: 1.93 X10(3)/MCL (ref 0.6–4.6)
LYMPHOCYTES NFR BLD AUTO: 23.9 %
MCH RBC QN AUTO: 29.4 PG
MCHC RBC AUTO-ENTMCNC: 33.7 G/DL (ref 33–36)
MCV RBC AUTO: 87.1 FL (ref 80–94)
MONOCYTES # BLD AUTO: 0.47 X10(3)/MCL (ref 0.1–1.3)
MONOCYTES NFR BLD AUTO: 5.8 %
NEUTROPHILS # BLD AUTO: 5.04 X10(3)/MCL (ref 2.1–9.2)
NEUTROPHILS NFR BLD AUTO: 62.6 %
NRBC BLD AUTO-RTO: 0 %
PLATELET # BLD AUTO: 316 X10(3)/MCL (ref 130–400)
PMV BLD AUTO: 11.3 FL (ref 7.4–10.4)
POTASSIUM SERPL-SCNC: 4 MMOL/L (ref 3.5–5.1)
PROT SERPL-MCNC: 7.4 GM/DL (ref 6.4–8.3)
RBC # BLD AUTO: 4.9 X10(6)/MCL (ref 4.7–6.1)
RSV A 5' UTR RNA NPH QL NAA+PROBE: NOT DETECTED
SARS-COV-2 RNA RESP QL NAA+PROBE: NOT DETECTED
SODIUM SERPL-SCNC: 141 MMOL/L (ref 136–145)
TROPONIN I SERPL-MCNC: <0.01 NG/ML (ref 0–0.04)
WBC # SPEC AUTO: 8.1 X10(3)/MCL (ref 4.5–11.5)

## 2023-03-01 PROCEDURE — 99285 EMERGENCY DEPT VISIT HI MDM: CPT | Mod: 25

## 2023-03-01 PROCEDURE — 85025 COMPLETE CBC W/AUTO DIFF WBC: CPT | Performed by: STUDENT IN AN ORGANIZED HEALTH CARE EDUCATION/TRAINING PROGRAM

## 2023-03-01 PROCEDURE — 93005 ELECTROCARDIOGRAM TRACING: CPT

## 2023-03-01 PROCEDURE — 0241U COVID/RSV/FLU A&B PCR: CPT | Performed by: STUDENT IN AN ORGANIZED HEALTH CARE EDUCATION/TRAINING PROGRAM

## 2023-03-01 PROCEDURE — 80053 COMPREHEN METABOLIC PANEL: CPT | Performed by: STUDENT IN AN ORGANIZED HEALTH CARE EDUCATION/TRAINING PROGRAM

## 2023-03-01 PROCEDURE — 84484 ASSAY OF TROPONIN QUANT: CPT | Performed by: STUDENT IN AN ORGANIZED HEALTH CARE EDUCATION/TRAINING PROGRAM

## 2023-03-01 NOTE — ED PROVIDER NOTES
Encounter Date: 3/1/2023       History     Chief Complaint   Patient presents with    Chest Pain     Pt reports left sided chest pain that radiates down the left arm that started last night.      42-year-old male presents to ED for chest pain.  Patient states for the last week he is had intermittent left-sided chest pain.  Woke up today and noticed some left hand tingling.  Denies any weakness, no trauma, no right-sided symptoms.  States he is otherwise healthy besides hypertension.  Denies any associated diaphoresis nausea or vomiting.  No fever or chills.  Did see a cardiologist years ago with no acute findings.  Patient states his symptoms are minimal at this time.  No other complaints or concerns at this time.    Review of patient's allergies indicates:  No Known Allergies  Past Medical History:   Diagnosis Date    Anemia     Gastric ulcer     Hypertension      Past Surgical History:   Procedure Laterality Date    COLONOSCOPY  2022    UPPER GASTROINTESTINAL ENDOSCOPY       Family History   Problem Relation Age of Onset    Diabetes Father     Heart disease Maternal Grandfather      Social History     Tobacco Use    Smoking status: Never    Smokeless tobacco: Never   Substance Use Topics    Alcohol use: Yes     Alcohol/week: 14.0 standard drinks     Types: 14 Cans of beer per week     Comment: 2 daily    Drug use: Not Currently     Review of Systems   Constitutional:  Negative for chills and fever.   HENT:  Negative for congestion, rhinorrhea and sore throat.    Eyes:  Negative for pain, discharge and itching.   Respiratory:  Negative for chest tightness and shortness of breath.    Cardiovascular:  Positive for chest pain. Negative for palpitations.   Gastrointestinal:  Negative for abdominal pain, nausea and vomiting.   Genitourinary:  Negative for dysuria and hematuria.   Musculoskeletal:  Negative for myalgias and neck pain.   Skin:  Negative for color change and rash.   Neurological:  Negative for dizziness,  weakness and headaches.        Left hand paresthesias   Psychiatric/Behavioral:  Negative for confusion. The patient is not hyperactive.      Physical Exam     Initial Vitals [03/01/23 0828]   BP Pulse Resp Temp SpO2   128/82 76 16 98.2 °F (36.8 °C) 99 %      MAP       --         Physical Exam    Constitutional: He appears well-developed and well-nourished. He is not diaphoretic. No distress.   HENT:   Head: Normocephalic and atraumatic.   Eyes: Conjunctivae and EOM are normal. Pupils are equal, round, and reactive to light.   Neck: Neck supple. No tracheal deviation present.   Normal range of motion.  Cardiovascular:  Normal rate, regular rhythm and normal heart sounds.           Pulmonary/Chest: Breath sounds normal. No respiratory distress.   Abdominal: Abdomen is soft. There is no abdominal tenderness. There is no rebound.   Musculoskeletal:         General: No tenderness. Normal range of motion.      Cervical back: Normal range of motion and neck supple.     Neurological: He is alert and oriented to person, place, and time. He has normal strength. GCS score is 15. GCS eye subscore is 4. GCS verbal subscore is 5. GCS motor subscore is 6.   Skin: Skin is warm and dry. Capillary refill takes less than 2 seconds. No rash noted.   Psychiatric: He has a normal mood and affect. His behavior is normal. Judgment and thought content normal.       ED Course   Procedures  Labs Reviewed   COMPREHENSIVE METABOLIC PANEL - Abnormal; Notable for the following components:       Result Value    Chloride 109 (*)     Blood Urea Nitrogen 7.5 (*)     All other components within normal limits   CBC WITH DIFFERENTIAL - Abnormal; Notable for the following components:    MPV 11.3 (*)     All other components within normal limits   TROPONIN I - Normal   COVID/RSV/FLU A&B PCR - Normal    Narrative:     The Xpert Xpress SARS-CoV-2/FLU/RSV plus is a rapid, multiplexed real-time PCR test intended for the simultaneous qualitative detection and  differentiation of SARS-CoV-2, Influenza A, Influenza B, and respiratory syncytial virus (RSV) viral RNA in either nasopharyngeal swab or nasal swab specimens.         CBC W/ AUTO DIFFERENTIAL    Narrative:     The following orders were created for panel order CBC auto differential.  Procedure                               Abnormality         Status                     ---------                               -----------         ------                     CBC with Differential[[id number]]        Abnormal            Final result                 Please view results for these tests on the individual orders.   EXTRA TUBES    Narrative:     The following orders were created for panel order EXTRA TUBES.  Procedure                               Abnormality         Status                     ---------                               -----------         ------                     Light Blue Top Hold[[id number]]                              In process                   Please view results for these tests on the individual orders.   LIGHT BLUE TOP HOLD     EKG Readings: (Independently Interpreted)   Initial Reading: No STEMI. Rhythm: Normal Sinus Rhythm. Ectopy: No Ectopy. Conduction: Normal. Axis: Normal. Clinical Impression: Normal Sinus Rhythm   ECG Results              EKG 12-lead (Chest Pain) Age >30 (Final result)  Result time 03/01/23 17:41:04      Final result by Interface, Lab In [facility] (03/01/23 17:41:04)                   Narrative:    Test Reason : R07.9,    Vent. Rate : 075 BPM     Atrial Rate : 075 BPM     P-R Int : 172 ms          QRS Dur : 090 ms      QT Int : 370 ms       P-R-T Axes : 015 026 021 degrees     QTc Int : 413 ms    Normal sinus rhythm  Normal ECG  When compared with ECG of 03-FEB-2023 00:59,  No significant change was found  Confirmed by Jose Luis De MD ([id number]) on 3/1/2023 5:40:55 PM    Referred By:             Confirmed By:Jose Luis De MD                                  Imaging Results               X-Ray Chest PA And Lateral (Final result)  Result time 03/01/23 10:27:07      Final result by Shara Briggs MD (03/01/23 10:27:07)                   Impression:      No acute abnormality of the chest.      Electronically signed by: Shara Briggs  Date:    03/01/2023  Time:    10:27               Narrative:    EXAMINATION:  XR CHEST PA AND LATERAL    CLINICAL HISTORY:  Chest pain, unspecified    TECHNIQUE:  PA and lateral chest radiographs    COMPARISON:  Chest x-ray dated 02/03/2023    FINDINGS:  The heart is normal in size.  The lungs are clear.  There is no pleural effusion or pneumothorax.                                       Medications - No data to display  Medical Decision Making:   History:   Old Medical Records: I decided to obtain old medical records.  Initial Assessment:   Left sided chest pain with paresthesia  Differential Diagnosis:   Myocardial ischemia  Arterial thrombus  Paresthesia  Viral syndrome  Nerve compression  Trauma  Clinical Tests:   Lab Tests: Reviewed and Ordered  Radiological Study: Reviewed and Ordered  Medical Tests: Reviewed and Ordered  ED Management:  Patient in department grossly asymptomatic.  Vitals stable.  EKG nonischemic.  Chest x-ray grossly unremarkable.  Patient's arm demonstrates full range of motion without difficulty, no loss of sensation appreciated, excellent radial pulse on that side.  No weakness or deficit appreciated.  Electrolytes grossly unremarkable, cardiac enzymes negative.  Delay in viral panel results which resulted negative as well.  Ultimately at this time patient is stable for outpatient follow-up.  Provided very strict return precautions and discharged stable. (Leah)            ED Course as of 03/04/23 0711   Wed Mar 01, 2023   1052 Delay in PCR results. [RZ]      ED Course User Index  [RZ] Melo Lynn MD                 Clinical Impression:   Final diagnoses:  [R07.9] Chest pain, unspecified type (Primary)        ED Disposition  Condition    Discharge Stable          ED Prescriptions    None       Follow-up Information       Follow up With Specialties Details Why Contact Info    ROSELINE Edmond Nurse Practitioner Schedule an appointment as soon as possible for a visit in 1 week  [address] [facility]  Internal Medicine Clinic  [facility] [address]  [phone]      [facility] Emergency Medicine  As needed, If symptoms worsen [address] [facility] [id number] [phone]    Cardiology  Schedule an appointment as soon as possible for a visit in 1 week               Melo Lynn MD  03/04/23 0787

## 2023-03-30 ENCOUNTER — OFFICE VISIT (OUTPATIENT)
Dept: INTERNAL MEDICINE | Facility: CLINIC | Age: 43
End: 2023-03-30
Payer: MEDICAID

## 2023-03-30 VITALS
SYSTOLIC BLOOD PRESSURE: 130 MMHG | TEMPERATURE: 98 F | HEART RATE: 74 BPM | HEIGHT: 67 IN | BODY MASS INDEX: 29.35 KG/M2 | DIASTOLIC BLOOD PRESSURE: 82 MMHG | RESPIRATION RATE: 20 BRPM | WEIGHT: 187 LBS

## 2023-03-30 DIAGNOSIS — F32.A ANXIETY AND DEPRESSION: Chronic | ICD-10-CM

## 2023-03-30 DIAGNOSIS — R07.89 OTHER CHEST PAIN: ICD-10-CM

## 2023-03-30 DIAGNOSIS — F41.9 ANXIETY AND DEPRESSION: Chronic | ICD-10-CM

## 2023-03-30 DIAGNOSIS — I10 PRIMARY HYPERTENSION: Primary | Chronic | ICD-10-CM

## 2023-03-30 DIAGNOSIS — G44.229 CHRONIC TENSION-TYPE HEADACHE, NOT INTRACTABLE: Chronic | ICD-10-CM

## 2023-03-30 PROCEDURE — 99214 OFFICE O/P EST MOD 30 MIN: CPT | Mod: S$PBB,,, | Performed by: NURSE PRACTITIONER

## 2023-03-30 PROCEDURE — 99214 OFFICE O/P EST MOD 30 MIN: CPT | Mod: PBBFAC | Performed by: NURSE PRACTITIONER

## 2023-03-30 PROCEDURE — 1160F RVW MEDS BY RX/DR IN RCRD: CPT | Mod: CPTII,,, | Performed by: NURSE PRACTITIONER

## 2023-03-30 PROCEDURE — 1159F PR MEDICATION LIST DOCUMENTED IN MEDICAL RECORD: ICD-10-PCS | Mod: CPTII,,, | Performed by: NURSE PRACTITIONER

## 2023-03-30 PROCEDURE — 3061F NEG MICROALBUMINURIA REV: CPT | Mod: CPTII,,, | Performed by: NURSE PRACTITIONER

## 2023-03-30 PROCEDURE — 3008F PR BODY MASS INDEX (BMI) DOCUMENTED: ICD-10-PCS | Mod: CPTII,,, | Performed by: NURSE PRACTITIONER

## 2023-03-30 PROCEDURE — 99214 PR OFFICE/OUTPT VISIT, EST, LEVL IV, 30-39 MIN: ICD-10-PCS | Mod: S$PBB,,, | Performed by: NURSE PRACTITIONER

## 2023-03-30 PROCEDURE — 3008F BODY MASS INDEX DOCD: CPT | Mod: CPTII,,, | Performed by: NURSE PRACTITIONER

## 2023-03-30 PROCEDURE — 3075F SYST BP GE 130 - 139MM HG: CPT | Mod: CPTII,,, | Performed by: NURSE PRACTITIONER

## 2023-03-30 PROCEDURE — 1160F PR REVIEW ALL MEDS BY PRESCRIBER/CLIN PHARMACIST DOCUMENTED: ICD-10-PCS | Mod: CPTII,,, | Performed by: NURSE PRACTITIONER

## 2023-03-30 PROCEDURE — 3066F PR DOCUMENTATION OF TREATMENT FOR NEPHROPATHY: ICD-10-PCS | Mod: CPTII,,, | Performed by: NURSE PRACTITIONER

## 2023-03-30 PROCEDURE — 3079F PR MOST RECENT DIASTOLIC BLOOD PRESSURE 80-89 MM HG: ICD-10-PCS | Mod: CPTII,,, | Performed by: NURSE PRACTITIONER

## 2023-03-30 PROCEDURE — 3066F NEPHROPATHY DOC TX: CPT | Mod: CPTII,,, | Performed by: NURSE PRACTITIONER

## 2023-03-30 PROCEDURE — 4010F ACE/ARB THERAPY RXD/TAKEN: CPT | Mod: CPTII,,, | Performed by: NURSE PRACTITIONER

## 2023-03-30 PROCEDURE — 4010F PR ACE/ARB THEARPY RXD/TAKEN: ICD-10-PCS | Mod: CPTII,,, | Performed by: NURSE PRACTITIONER

## 2023-03-30 PROCEDURE — 1159F MED LIST DOCD IN RCRD: CPT | Mod: CPTII,,, | Performed by: NURSE PRACTITIONER

## 2023-03-30 PROCEDURE — 3079F DIAST BP 80-89 MM HG: CPT | Mod: CPTII,,, | Performed by: NURSE PRACTITIONER

## 2023-03-30 PROCEDURE — 3061F PR NEG MICROALBUMINURIA RESULT DOCUMENTED/REVIEW: ICD-10-PCS | Mod: CPTII,,, | Performed by: NURSE PRACTITIONER

## 2023-03-30 PROCEDURE — 3075F PR MOST RECENT SYSTOLIC BLOOD PRESS GE 130-139MM HG: ICD-10-PCS | Mod: CPTII,,, | Performed by: NURSE PRACTITIONER

## 2023-03-30 RX ORDER — SUMATRIPTAN SUCCINATE 25 MG/1
25 TABLET ORAL DAILY PRN
Qty: 9 TABLET | Refills: 1 | Status: SHIPPED | OUTPATIENT
Start: 2023-03-30

## 2023-03-30 RX ORDER — PROPRANOLOL HYDROCHLORIDE 40 MG/1
40 TABLET ORAL 2 TIMES DAILY
Qty: 180 TABLET | Refills: 1 | Status: SHIPPED | OUTPATIENT
Start: 2023-03-30 | End: 2024-02-16 | Stop reason: SDUPTHER

## 2023-03-30 RX ORDER — SERTRALINE HYDROCHLORIDE 25 MG/1
25 TABLET, FILM COATED ORAL NIGHTLY
Qty: 30 TABLET | Refills: 1 | Status: SHIPPED | OUTPATIENT
Start: 2023-03-30 | End: 2023-05-26 | Stop reason: SDUPTHER

## 2023-03-30 RX ORDER — FERROUS SULFATE 325(65) MG
325 TABLET, DELAYED RELEASE (ENTERIC COATED) ORAL DAILY
Qty: 90 TABLET | Refills: 1 | Status: SHIPPED | OUTPATIENT
Start: 2023-03-30

## 2023-03-30 RX ORDER — NIFEDIPINE 90 MG/1
90 TABLET, EXTENDED RELEASE ORAL DAILY
Qty: 90 TABLET | Refills: 1 | Status: SHIPPED | OUTPATIENT
Start: 2023-03-30 | End: 2024-02-16 | Stop reason: SDUPTHER

## 2023-03-30 RX ORDER — HYDROXYZINE PAMOATE 50 MG/1
50 CAPSULE ORAL EVERY 6 HOURS PRN
COMMUNITY

## 2023-03-30 RX ORDER — BUTALBITAL, ACETAMINOPHEN AND CAFFEINE 50; 325; 40 MG/1; MG/1; MG/1
1 TABLET ORAL EVERY 6 HOURS PRN
Qty: 45 TABLET | Refills: 1 | Status: SHIPPED | OUTPATIENT
Start: 2023-03-30 | End: 2023-05-26 | Stop reason: SDUPTHER

## 2023-03-30 NOTE — PROGRESS NOTES
Jimena Carpenter, ROSELINE   [facility]  [address] [facility] [address]      PATIENT NAME: Fredi Ricci  : 1980  DATE: 3/30/23  MRN: [id number]      Reason for Visit / Chief Complaint: Hypertension (B/P check, anxiety eval)       History of Present Illness / Problem Focused Workflow     Fredi Ricci is a 43 y.o. Black or  male presents to the clinic for HTN and anxiety f/u. PMH HTN, migraines, constipation, MALLORIE, GERD and gastric ulcer (chronic NSAID use ). Followed by [facility] GI clinic. Underwent EGD and colonoscopy per Dr. Schultz in 3/2022. Has GI visit scheduled on 23.    Reported to ED on 3/1/23 with c/o chest pain and left arm paresthesia. Cardiac workup was unremarkable and has cardio visit on 23. Denies any further CP or left arm paresthesia. Continues to report anxiety but is only taking vistaril 2x/week. Reports daily life stressors with significant other and some depression. Is effecting children and is causing him worsening stress. BP at goal today. Also reports worsening headaches; somewhat improved with fioricet but not relieved. Declines flu vaccine today. Denies chest pain, shortness of breath, cough, fever, headache, dizziness, weakness, abdominal pain, nausea, vomiting, diarrhea, constipation, dysuria, SI/HI.      Review of Systems     Review of Systems     See HPI for details    Constitutional: Denies Change in appetite. Denies Chills. Denies Fever. Denies Night sweats.  Eye: Denies Blurred vision. Denies Discharge. Denies Eye pain.  ENT: Denies Decreased hearing. Denies Sore throat. Denies Swollen glands.  Respiratory: Denies Cough. Denies Shortness of breath. Denies Shortness of breath with exertion. Denies Wheezing.  Cardiovascular: Denies Chest pain at rest. Denies Chest pain with exertion. Denies Irregular heartbeat. Denies Palpitations. Denies Edema.  Gastrointestinal: Denies Abdominal  pain. Denies Diarrhea. Denies Nausea. Denies Vomiting. Denies Hematemesis or Hematochezia.  Genitourinary: Denies Dysuria. Denies Urinary frequency. Denies Urinary urgency. Denies Blood in urine.  Endocrine: Denies Cold intolerance. Denies Excessive thirst. Denies Heat intolerance. Denies Weight loss. Denies Weight gain.  Musculoskeletal: Denies Painful joints. Denies Weakness.  Integumentary: Denies Rash. Denies Itching. Denies Dry skin.  Neurologic: Denies Dizziness. Denies Fainting. Admits Headache.  Psychiatric: Admits Depression. Admits Anxiety. Denies Suicidal/Homicidal ideations.    All Other ROS: Negative except as stated in HPI.     Medical / Surgical / Social / Family History       ----------------------------  Anemia  Gastric ulcer  Hypertension     Past Surgical History:   Procedure Laterality Date    COLONOSCOPY  2022    UPPER GASTROINTESTINAL ENDOSCOPY         Social History     Socioeconomic History    Marital status: Single   Tobacco Use    Smoking status: Never    Smokeless tobacco: Never   Substance and Sexual Activity    Alcohol use: Yes     Alcohol/week: 14.0 standard drinks     Types: 14 Cans of beer per week     Comment: 2 daily    Drug use: Not Currently     Social Determinants of Health     Transportation Needs: No Transportation Needs    Lack of Transportation (Medical): No    Lack of Transportation (Non-Medical): No   Housing Stability: Low Risk     Unable to Pay for Housing in the Last Year: No    Number of Places Lived in the Last Year: 1    Unstable Housing in the Last Year: No        Family History   Problem Relation Age of Onset    Diabetes Father     Heart disease Maternal Grandfather         Medications and Allergies     Medications  Current Outpatient Medications   Medication Instructions    butalbital-acetaminophen-caffeine -40 mg (FIORICET, ESGIC) -40 mg per tablet 1 tablet, Oral, Every 6 hours PRN    ferrous sulfate 325 mg, Oral, Daily    losartan (COZAAR) 25 mg,  "Oral, Daily    NIFEdipine (PROCARDIA-XL) 90 mg, Oral, Daily    pantoprazole (PROTONIX) 40 mg, Oral, Daily    propranoloL (INDERAL) 40 mg, Oral, 2 times daily    sertraline (ZOLOFT) 25 mg, Oral, Nightly    sumatriptan (IMITREX) 25 mg, Oral, Daily PRN, May repeat in 2 hrs x 1 dose if initial dose ineffective         Allergies  Review of patient's allergies indicates:  No Known Allergies    Physical Examination     /82 (BP Location: Right arm, Patient Position: Sitting, BP Method: Medium (Manual))   Pulse 74   Temp 97.8 °F (36.6 °C) (Oral)   Resp 20   Ht 5' 6.93" (1.7 m)   Wt 84.8 kg (187 lb)   BMI 29.35 kg/m²     Physical Exam  Psychiatric:         Mood and Affect: Affect is blunt.       General: Alert and oriented, No acute distress.  Head: Normocephalic, Atraumatic.  Eye: Pupils are equal, round and reactive to light, Extraocular movements are intact, Sclera non-icteric.  Ears/Nose/Throat: Normal, Mucosa moist,Clear.  Neck/Thyroid: Supple, Non-tender, No carotid bruit, No lymphadenopathy, No JVD, Full range of motion.  Respiratory: Clear to auscultation bilaterally; No wheezes, rales or rhonchi,Non-labored respirations, Symmetrical chest wall expansion.  Cardiovascular: Regular rate and rhythm, S1/S2 normal, No murmurs, rubs or gallops.  Gastrointestinal: Soft, Non-tender, Non-distended, Normal bowel sounds, No palpable organomegaly.  Musculoskeletal: Normal range of motion.  Integumentary: Warm, Dry, Intact, No suspicious lesions or rashes.  Extremities: No clubbing, cyanosis or edema  Neurologic: No focal deficits, Cranial Nerves II-XII are grossly intact, Motor strength normal upper and lower extremities, Sensory exam intact.  Psychiatric: Normal interaction, Coherent speech, Appropriate affect       Results     Lab Results   Component Value Date    WBC 8.1 03/01/2023    HGB 14.4 03/01/2023    HCT 42.7 03/01/2023     03/01/2023    CHOL 207 (H) 01/27/2023    TRIG 130 01/27/2023    ALT 24 " 03/01/2023    AST 19 03/01/2023     03/01/2023    K 4.0 03/01/2023    CREATININE 0.81 03/01/2023    BUN 7.5 (L) 03/01/2023    CO2 25 03/01/2023    TSH 0.634 01/27/2023    PSA 0.80 01/27/2023    INR 1.08 06/24/2021    HGBA1C 5.1 01/27/2023         Assessment and Plan (including Health Maintenance)     Plan:     1. Primary hypertension  At goal.  Continue losartan and procardia.  Follow a low sodium (less than 2 grams of sodium per day), DASH diet.   Continue medications as prescribed.  Monitor blood pressure and report any consistent values greater than 140/90 and keep a log.  Maintain healthy weight with a BMI goal of <30.   Aerobic exercise for 150 minutes per week (or 5 days a week for 30 minutes each day).      - NIFEdipine (PROCARDIA-XL) 90 MG (OSM) 24 hr tablet; Take 1 tablet (90 mg total) by mouth once daily.  Dispense: 90 tablet; Refill: 1    2. Anxiety and depression  Rx zoloft.  Continue vistaril prn.  Denies SI/HI.  Read positive daily meditations, avoid negative media, set healthy boundaries.  Exercise daily, keep consistent sleep pattern, eat a healthy diet.  Establish good social support, make changes to reduce stress.  Do not drink alcohol or use illicit drugs.  Reports any symptoms of suicidal/homicidal ideations or self harm immediately, go to nearest emergency room.      3. Chronic tension-type headache, not intractable  Rx imitrex prn.  Continue fioricet prn.  Avoid triggers such as bright lights, alcohol, nicotine, aged cheeses, chocolate, caffeine, foods/drinks that contain nitrates or aspartame.  Take meds as prescribed.  Lie in a quiet, dark room if possible.  Limit stress and get at least 8 hours of sleep per night.      4. Other chest pain  Keep cardio appt as scheduled.  ED precautions.      Problem List Items Addressed This Visit          Psychiatric    Anxiety and depression (Chronic)       Cardiac/Vascular    Primary hypertension - Primary (Chronic)    Relevant Medications     NIFEdipine (PROCARDIA-XL) 90 MG (OSM) 24 hr tablet    Other chest pain       Endocrine    Overweight with body mass index (BMI) of 29 to 29.9 in adult (Chronic)         Health Maintenance Due   Topic Date Due    Hepatitis C Screening  Never done    COVID-19 Vaccine (1) Never done    HIV Screening  Never done    Influenza Vaccine (1) Never done       Follow up in about 3 weeks (around 4/20/2023) for Anxiety and migraine f/u.        Signature:  ROSELINE Quintanilla  [facility]  [address] [facility] [address]

## 2023-05-15 PROBLEM — Z00.00 WELLNESS EXAMINATION: Status: RESOLVED | Noted: 2023-01-27 | Resolved: 2023-05-15

## 2023-05-26 ENCOUNTER — OFFICE VISIT (OUTPATIENT)
Dept: INTERNAL MEDICINE | Facility: CLINIC | Age: 43
End: 2023-05-26
Payer: MEDICAID

## 2023-05-26 VITALS
HEIGHT: 67 IN | RESPIRATION RATE: 20 BRPM | WEIGHT: 191.13 LBS | HEART RATE: 64 BPM | DIASTOLIC BLOOD PRESSURE: 95 MMHG | BODY MASS INDEX: 30 KG/M2 | SYSTOLIC BLOOD PRESSURE: 153 MMHG | TEMPERATURE: 98 F

## 2023-05-26 DIAGNOSIS — F32.A ANXIETY AND DEPRESSION: Primary | Chronic | ICD-10-CM

## 2023-05-26 DIAGNOSIS — I10 PRIMARY HYPERTENSION: Chronic | ICD-10-CM

## 2023-05-26 DIAGNOSIS — F41.9 ANXIETY AND DEPRESSION: Primary | Chronic | ICD-10-CM

## 2023-05-26 DIAGNOSIS — G44.229 CHRONIC TENSION-TYPE HEADACHE, NOT INTRACTABLE: Chronic | ICD-10-CM

## 2023-05-26 PROCEDURE — 4010F PR ACE/ARB THEARPY RXD/TAKEN: ICD-10-PCS | Mod: CPTII,,, | Performed by: NURSE PRACTITIONER

## 2023-05-26 PROCEDURE — 1160F RVW MEDS BY RX/DR IN RCRD: CPT | Mod: CPTII,,, | Performed by: NURSE PRACTITIONER

## 2023-05-26 PROCEDURE — 99214 OFFICE O/P EST MOD 30 MIN: CPT | Mod: S$PBB,,, | Performed by: NURSE PRACTITIONER

## 2023-05-26 PROCEDURE — 3066F NEPHROPATHY DOC TX: CPT | Mod: CPTII,,, | Performed by: NURSE PRACTITIONER

## 2023-05-26 PROCEDURE — 3080F DIAST BP >= 90 MM HG: CPT | Mod: CPTII,,, | Performed by: NURSE PRACTITIONER

## 2023-05-26 PROCEDURE — 3061F PR NEG MICROALBUMINURIA RESULT DOCUMENTED/REVIEW: ICD-10-PCS | Mod: CPTII,,, | Performed by: NURSE PRACTITIONER

## 2023-05-26 PROCEDURE — 4010F ACE/ARB THERAPY RXD/TAKEN: CPT | Mod: CPTII,,, | Performed by: NURSE PRACTITIONER

## 2023-05-26 PROCEDURE — 1159F PR MEDICATION LIST DOCUMENTED IN MEDICAL RECORD: ICD-10-PCS | Mod: CPTII,,, | Performed by: NURSE PRACTITIONER

## 2023-05-26 PROCEDURE — 3077F SYST BP >= 140 MM HG: CPT | Mod: CPTII,,, | Performed by: NURSE PRACTITIONER

## 2023-05-26 PROCEDURE — 1159F MED LIST DOCD IN RCRD: CPT | Mod: CPTII,,, | Performed by: NURSE PRACTITIONER

## 2023-05-26 PROCEDURE — 99215 OFFICE O/P EST HI 40 MIN: CPT | Mod: PBBFAC | Performed by: NURSE PRACTITIONER

## 2023-05-26 PROCEDURE — 3077F PR MOST RECENT SYSTOLIC BLOOD PRESSURE >= 140 MM HG: ICD-10-PCS | Mod: CPTII,,, | Performed by: NURSE PRACTITIONER

## 2023-05-26 PROCEDURE — 3008F PR BODY MASS INDEX (BMI) DOCUMENTED: ICD-10-PCS | Mod: CPTII,,, | Performed by: NURSE PRACTITIONER

## 2023-05-26 PROCEDURE — 3061F NEG MICROALBUMINURIA REV: CPT | Mod: CPTII,,, | Performed by: NURSE PRACTITIONER

## 2023-05-26 PROCEDURE — 99214 PR OFFICE/OUTPT VISIT, EST, LEVL IV, 30-39 MIN: ICD-10-PCS | Mod: S$PBB,,, | Performed by: NURSE PRACTITIONER

## 2023-05-26 PROCEDURE — 3008F BODY MASS INDEX DOCD: CPT | Mod: CPTII,,, | Performed by: NURSE PRACTITIONER

## 2023-05-26 PROCEDURE — 3066F PR DOCUMENTATION OF TREATMENT FOR NEPHROPATHY: ICD-10-PCS | Mod: CPTII,,, | Performed by: NURSE PRACTITIONER

## 2023-05-26 PROCEDURE — 3080F PR MOST RECENT DIASTOLIC BLOOD PRESSURE >= 90 MM HG: ICD-10-PCS | Mod: CPTII,,, | Performed by: NURSE PRACTITIONER

## 2023-05-26 PROCEDURE — 1160F PR REVIEW ALL MEDS BY PRESCRIBER/CLIN PHARMACIST DOCUMENTED: ICD-10-PCS | Mod: CPTII,,, | Performed by: NURSE PRACTITIONER

## 2023-05-26 RX ORDER — TOPIRAMATE 25 MG/1
25 TABLET ORAL 2 TIMES DAILY
Qty: 60 TABLET | Refills: 1 | Status: SHIPPED | OUTPATIENT
Start: 2023-05-26

## 2023-05-26 RX ORDER — SERTRALINE HYDROCHLORIDE 25 MG/1
25 TABLET, FILM COATED ORAL NIGHTLY
Qty: 90 TABLET | Refills: 1 | Status: SHIPPED | OUTPATIENT
Start: 2023-05-26

## 2023-05-26 RX ORDER — BUTALBITAL, ACETAMINOPHEN AND CAFFEINE 50; 325; 40 MG/1; MG/1; MG/1
1 TABLET ORAL EVERY 6 HOURS PRN
Qty: 45 TABLET | Refills: 1 | Status: SHIPPED | OUTPATIENT
Start: 2023-05-26

## 2023-05-26 NOTE — PROGRESS NOTES
Jimena Carpenter, ROSELINE   [facility]  [address] [facility] [address]      PATIENT NAME: Fredi Ricci  : 1980  DATE: 23  MRN: [id number]      Reason for Visit / Chief Complaint: Follow-up (Medication eval., states meds are working, needs refills )       History of Present Illness / Problem Focused Workflow     Fredi Ricci is a 43 y.o. Black or  male presents to the clinic for HTN and anxiety/depression f/u. PMH HTN, migraines, constipation, MALLORIE, GERD and gastric ulcer (chronic NSAID use ). Followed by [facility] GI clinic.     Today, bp above goal. Reports at goal when checked QOD at home. Reports has headache this morning and took fioricet. Has had headache about 3-4 x/week. Reports med compliance. Attempts low sodium diet. Reports zoloft is helping with daily stressors; can handle better. Others have noticed he's more calm. Continues to take vistaril prn. Denies chest pain, shortness of breath, cough, fever, headache, dizziness, weakness, abdominal pain, nausea, vomiting, diarrhea, constipation, dysuria, SI/HI.    Review of Systems     Review of Systems     See HPI for details    Constitutional: Denies Change in appetite. Denies Chills. Denies Fever. Denies Night sweats.  Eye: Denies Blurred vision. Denies Discharge. Denies Eye pain.  ENT: Denies Decreased hearing. Denies Sore throat. Denies Swollen glands.  Respiratory: Denies Cough. Denies Shortness of breath. Denies Shortness of breath with exertion. Denies Wheezing.  Cardiovascular: Denies Chest pain at rest. Denies Chest pain with exertion. Denies Irregular heartbeat. Denies Palpitations. Denies Edema.  Gastrointestinal: Denies Abdominal pain. Denies Diarrhea. Denies Nausea. Denies Vomiting. Denies Hematemesis or Hematochezia.  Genitourinary: Denies Dysuria. Denies Urinary frequency. Denies Urinary urgency. Denies Blood in urine.  Endocrine: Denies Cold  intolerance. Denies Excessive thirst. Denies Heat intolerance. Denies Weight loss. Denies Weight gain.  Musculoskeletal: Denies Painful joints. Denies Weakness.  Integumentary: Denies Rash. Denies Itching. Denies Dry skin.  Neurologic: Denies Dizziness. Denies Fainting. Admits Headache.  Psychiatric: Denies Depression. Denies Anxiety. Denies Suicidal/Homicidal ideations.    All Other ROS: Negative except as stated in HPI.     Medical / Surgical / Social / Family History       ----------------------------  Anemia  Gastric ulcer  Hypertension     Past Surgical History:   Procedure Laterality Date    COLONOSCOPY  2022    UPPER GASTROINTESTINAL ENDOSCOPY         Social History     Socioeconomic History    Marital status: Single   Tobacco Use    Smoking status: Never     Passive exposure: Never    Smokeless tobacco: Never   Substance and Sexual Activity    Alcohol use: Yes     Alcohol/week: 14.0 standard drinks     Types: 14 Cans of beer per week     Comment: 2 daily    Drug use: Not Currently     Social Determinants of Health     Transportation Needs: No Transportation Needs    Lack of Transportation (Medical): No    Lack of Transportation (Non-Medical): No   Housing Stability: Low Risk     Unable to Pay for Housing in the Last Year: No    Number of Places Lived in the Last Year: 1    Unstable Housing in the Last Year: No        Family History   Problem Relation Age of Onset    Diabetes Father     Heart disease Maternal Grandfather         Medications and Allergies     Medications  Current Outpatient Medications   Medication Instructions    butalbital-acetaminophen-caffeine -40 mg (FIORICET, ESGIC) -40 mg per tablet 1 tablet, Oral, Every 6 hours PRN    ferrous sulfate 325 mg, Oral, Daily    hydrOXYzine pamoate (VISTARIL) 50 mg, Oral, Every 6 hours PRN    losartan (COZAAR) 25 mg, Oral, Daily    NIFEdipine (PROCARDIA-XL) 90 mg, Oral, Daily    pantoprazole (PROTONIX) 40 mg, Oral, Daily    propranoloL (INDERAL)  "40 mg, Oral, 2 times daily    sertraline (ZOLOFT) 25 mg, Oral, Nightly    sumatriptan (IMITREX) 25 mg, Oral, Daily PRN, May repeat in 2 hrs x 1 dose if initial dose ineffective    topiramate (TOPAMAX) 25 mg, Oral, 2 times daily, Take at bedtime for the first 7 days and then increase to BID.         Allergies  Review of patient's allergies indicates:  No Known Allergies    Physical Examination     BP (!) 153/95 (BP Location: Left arm, Patient Position: Sitting, BP Method: Large (Automatic))   Pulse 64   Temp 97.5 °F (36.4 °C) (Oral)   Resp 20   Ht 5' 6.93" (1.7 m)   Wt 86.7 kg (191 lb 2.2 oz)   BMI 30.00 kg/m²     Physical Exam    General: Alert and oriented, No acute distress.  Head: Normocephalic, Atraumatic.  Eye: Pupils are equal, round and reactive to light, Extraocular movements are intact, Sclera non-icteric.  Ears/Nose/Throat: Normal, Mucosa moist,Clear.  Neck/Thyroid: Supple, Non-tender, No carotid bruit, No lymphadenopathy, No JVD, Full range of motion.  Respiratory: Clear to auscultation bilaterally; No wheezes, rales or rhonchi,Non-labored respirations, Symmetrical chest wall expansion.  Cardiovascular: Regular rate and rhythm, S1/S2 normal, No murmurs, rubs or gallops.  Gastrointestinal: Soft, Non-tender, Non-distended, Normal bowel sounds, No palpable organomegaly.  Musculoskeletal: Normal range of motion.  Integumentary: Warm, Dry, Intact, No suspicious lesions or rashes.  Extremities: No clubbing, cyanosis or edema  Neurologic: No focal deficits, Cranial Nerves II-XII are grossly intact, Motor strength normal upper and lower extremities, Sensory exam intact.  Psychiatric: Normal interaction, Coherent speech, Appropriate affect       Results     Lab Results   Component Value Date    WBC 8.1 03/01/2023    HGB 14.4 03/01/2023    HCT 42.7 03/01/2023     03/01/2023    CHOL 207 (H) 01/27/2023    TRIG 130 01/27/2023    ALT 24 03/01/2023    AST 19 03/01/2023     03/01/2023    K 4.0 " 03/01/2023    CREATININE 0.81 03/01/2023    BUN 7.5 (L) 03/01/2023    CO2 25 03/01/2023    TSH 0.634 01/27/2023    PSA 0.80 01/27/2023    INR 1.08 06/24/2021    HGBA1C 5.1 01/27/2023         Assessment and Plan (including Health Maintenance)     Plan:     1. Primary hypertension  Elevated; attributes to headache.  Continue losartan, procardia and propanolol.  Follow a low sodium (less than 2 grams of sodium per day), DASH diet.   Continue medications as prescribed.  Monitor blood pressure and report any consistent values greater than 140/90 and keep a log.  Maintain healthy weight with a BMI goal of <30.   Aerobic exercise for 150 minutes per week (or 5 days a week for 30 minutes each day).      2. Chronic tension-type headache, not intractable  Rx topamax.  Begin at bedtime x 7 days then increase to BID.  Continue fioricet and imitrex prn.   Avoid triggers such as bright lights, alcohol, nicotine, aged cheeses, chocolate, caffeine, foods/drinks that contain nitrates or aspartame.  Take meds as prescribed.  Lie in a quiet, dark room if possible.  Limit stress and get at least 8 hours of sleep per night.      3. Anxiety and depression  Continue zoloft daily and vistaril prn.  Denies SI/HI.  Read positive daily meditations, avoid negative media, set healthy boundaries.  Exercise daily, keep consistent sleep pattern, eat a healthy diet.  Establish good social support, make changes to reduce stress.  Do not drink alcohol or use illicit drugs.  Reports any symptoms of suicidal/homicidal ideations or self harm immediately, go to nearest emergency room.        Problem List Items Addressed This Visit          Neuro    Headache (Chronic)       Psychiatric    Anxiety and depression - Primary (Chronic)       Cardiac/Vascular    Primary hypertension (Chronic)         Health Maintenance Due   Topic Date Due    Hepatitis C Screening  Never done    COVID-19 Vaccine (1) Never done    HIV Screening  Never done       Follow up in  about 4 weeks (around 6/23/2023) for Follow up.        Signature:  ROSELINE Quintanilla  [facility]  [address] [facility] [address]

## 2023-06-16 ENCOUNTER — OFFICE VISIT (OUTPATIENT)
Dept: CARDIOLOGY | Facility: CLINIC | Age: 43
End: 2023-06-16
Payer: MEDICAID

## 2023-06-16 VITALS
BODY MASS INDEX: 26.84 KG/M2 | HEART RATE: 73 BPM | OXYGEN SATURATION: 99 % | WEIGHT: 181.19 LBS | HEIGHT: 69 IN | RESPIRATION RATE: 17 BRPM | DIASTOLIC BLOOD PRESSURE: 108 MMHG | SYSTOLIC BLOOD PRESSURE: 150 MMHG

## 2023-06-16 DIAGNOSIS — R07.89 ATYPICAL CHEST PAIN: Primary | ICD-10-CM

## 2023-06-16 DIAGNOSIS — R07.9 CHEST PAIN, UNSPECIFIED TYPE: ICD-10-CM

## 2023-06-16 DIAGNOSIS — I10 PRIMARY HYPERTENSION: Chronic | ICD-10-CM

## 2023-06-16 PROCEDURE — 99215 OFFICE O/P EST HI 40 MIN: CPT | Mod: PBBFAC | Performed by: INTERNAL MEDICINE

## 2023-06-16 NOTE — PROGRESS NOTES
[facility] Clinic Visit    Chief Complaint:      Follow-up (New pt ref for CP; midsternal stabbing pain; work up in ED was (-); states he has SOB at rest)     Subjective:     HPI:  Fredi Ricci is a 43 y.o. male who has a past medical history of Anemia, Gastric ulcer, and Hypertension.  He was referred to cardiology clinic after being seen in the emergency department 03/01/2023 for chest pain. Patient states the sx onset 2-3 weeks prior to the ED visit, and lasted for roughly a week afterwards.  States he has not had similar symptoms prior and, he has not had these symptoms since.  Patient describes symptoms as right-sided stabbing and sharp rated 10/10 in severity.  Pain occurred at night around 0100 and lasts to roughly 0500.  Associated symptoms include dyspnea while at rest, leg swelling. Patient reports he would get winded after walking roughly a half mile to a mile and would take an hour or two to recover. Patient states he does have a lot of stress with young kids at home, difficulty finding  to get work and bills to pay.  He denies any palpitations, orthopnea, PND, cough.  EKG done at that emergency department visit showed normal sinus rhythm with a rate of 75, normal axis no acute ST changes.  Troponins were negative.      Denies smoking. Does drink alcohol, 1-2 beers a day. Denies use of any illicit substances.   Does report unspecified family history in maternal granmothers side.       Past Medical History:   Diagnosis Date    Anemia     Gastric ulcer     Hypertension        Past Surgical History:   Procedure Laterality Date    COLONOSCOPY  2022    UPPER GASTROINTESTINAL ENDOSCOPY         Social History     Socioeconomic History    Marital status: Single   Tobacco Use    Smoking status: Never     Passive exposure: Never    Smokeless tobacco: Never   Substance and Sexual Activity    Alcohol use: Yes     Alcohol/week: 14.0 standard drinks     Types: 14 Cans of beer per week      "Comment: 2 daily    Drug use: Not Currently     Social Determinants of Health     Transportation Needs: No Transportation Needs    Lack of Transportation (Medical): No    Lack of Transportation (Non-Medical): No   Housing Stability: Low Risk     Unable to Pay for Housing in the Last Year: No    Number of Places Lived in the Last Year: 1    Unstable Housing in the Last Year: No         Current Outpatient Medications   Medication Instructions    butalbital-acetaminophen-caffeine -40 mg (FIORICET, ESGIC) -40 mg per tablet 1 tablet, Oral, Every 6 hours PRN    ferrous sulfate 325 mg, Oral, Daily    hydrOXYzine pamoate (VISTARIL) 50 mg, Oral, Every 6 hours PRN    losartan (COZAAR) 25 mg, Oral, Daily    NIFEdipine (PROCARDIA-XL) 90 mg, Oral, Daily    pantoprazole (PROTONIX) 40 mg, Oral, Daily    propranoloL (INDERAL) 40 mg, Oral, 2 times daily    sertraline (ZOLOFT) 25 mg, Oral, Nightly    sumatriptan (IMITREX) 25 mg, Oral, Daily PRN, May repeat in 2 hrs x 1 dose if initial dose ineffective    topiramate (TOPAMAX) 25 mg, Oral, 2 times daily, Take at bedtime for the first 7 days and then increase to BID.       Review of Systems  Review of Systems   Constitutional:  Negative for chills and fever.   Respiratory:  Positive for shortness of breath. Negative for cough, hemoptysis and sputum production.    Cardiovascular:  Positive for chest pain and leg swelling. Negative for palpitations, orthopnea and PND.   Gastrointestinal:  Negative for abdominal pain, diarrhea, nausea and vomiting.   Genitourinary:  Negative for flank pain and hematuria.   Skin:  Negative for itching and rash.   Neurological:  Negative for weakness and headaches.   Endo/Heme/Allergies:  Does not bruise/bleed easily.       Objective:   Last 24 Hour Vital Signs:  Vitals  BP: (!) 150/108  Pulse: 73  Resp: 17  SpO2: 99 %  Height: 5' 9" (175.3 cm)  Weight: 82.2 kg (181 lb 3.2 oz)    Physical Examination:  Vital signs and nursing notes " reviewed.  Constitutional: Patient is in NAD. Awake and alert.    Head: Atraumatic. Normocephalic.  Eyes: Conjunctivae nl. No scleral icterus.  ENT: Mucous membranes are moist. Oropharynx is clear.  Neck: Supple. Full ROM.  No JVD.  Cardiovascular: Regular rate and rhythm. No murmurs, rubs, or gallops. Distal pulses are 2+ and symmetric .  Pulmonary/Chest: No respiratory distress. Clear to auscultation bilaterally. No wheezing, rales, or rhonchi.  Abdominal: Soft. Non-distended. No TTP. No rebound, guarding, or rigidity.   Musculoskeletal: Moves all extremities. No edema.    Skin: Warm and dry.  Neurological: Awake and alert. No acute focal neurological deficits are appreciated.       Assessment & Plan:     Atypical chest pain  - has been evaluated in the emergency department for this.  Negative troponins and normal EKG.  - has been asymptomatic for the past couple of months.  - we will assess with an outpatient treadmill stress test.    Hypertension  - patient is on Procardia 90 mg, losartan 25, propranolol 40      Headaches  - patient on Topamax, Fioricet, and sumatriptan.        RCRI is 0.  Based on the RCRI This patient is 30 day cardiac risk of death, MI, or cardiac arrest is low for low risk surgery.    F/u in clinic 4 months    Abdulaziz Suarez DO  [facility], [facility]

## 2023-06-16 NOTE — PROGRESS NOTES
Cardiology Attending    I evaluated Fredi Ricci and discussed the patient's symptoms, findings, and management plan with the resident.  Please see the Cardiology note for details.

## 2023-06-26 ENCOUNTER — HOSPITAL ENCOUNTER (EMERGENCY)
Facility: HOSPITAL | Age: 43
Discharge: HOME | End: 2023-06-26
Attending: EMERGENCY MEDICINE
Payer: MEDICAID

## 2023-06-26 VITALS
OXYGEN SATURATION: 99 % | BODY MASS INDEX: 29.99 KG/M2 | WEIGHT: 180 LBS | DIASTOLIC BLOOD PRESSURE: 92 MMHG | HEIGHT: 65 IN | RESPIRATION RATE: 17 BRPM | SYSTOLIC BLOOD PRESSURE: 158 MMHG | HEART RATE: 74 BPM | TEMPERATURE: 98 F

## 2023-06-26 DIAGNOSIS — K21.9 GASTROESOPHAGEAL REFLUX DISEASE, UNSPECIFIED WHETHER ESOPHAGITIS PRESENT: Primary | ICD-10-CM

## 2023-06-26 DIAGNOSIS — I10 HYPERTENSION, UNSPECIFIED TYPE: ICD-10-CM

## 2023-06-26 DIAGNOSIS — R07.9 CHEST PAIN: ICD-10-CM

## 2023-06-26 LAB
ALBUMIN SERPL-MCNC: 4.5 G/DL (ref 3.5–5)
ALBUMIN/GLOB SERPL: 1.3 RATIO (ref 1.1–2)
ALP SERPL-CCNC: 81 UNIT/L (ref 40–150)
ALT SERPL-CCNC: 57 UNIT/L (ref 0–55)
AST SERPL-CCNC: 26 UNIT/L (ref 5–34)
BASOPHILS # BLD AUTO: 0.04 X10(3)/MCL
BASOPHILS NFR BLD AUTO: 0.4 %
BILIRUBIN DIRECT+TOT PNL SERPL-MCNC: 0.3 MG/DL
BUN SERPL-MCNC: 13.3 MG/DL (ref 8.9–20.6)
CALCIUM SERPL-MCNC: 9.7 MG/DL (ref 8.4–10.2)
CHLORIDE SERPL-SCNC: 104 MMOL/L (ref 98–107)
CO2 SERPL-SCNC: 26 MMOL/L (ref 22–29)
CREAT SERPL-MCNC: 0.85 MG/DL (ref 0.73–1.18)
EOSINOPHIL # BLD AUTO: 0.79 X10(3)/MCL (ref 0–0.9)
EOSINOPHIL NFR BLD AUTO: 7.6 %
ERYTHROCYTE [DISTWIDTH] IN BLOOD BY AUTOMATED COUNT: 13.2 % (ref 11.5–17)
GFR SERPLBLD CREATININE-BSD FMLA CKD-EPI: >60 MLS/MIN/1.73/M2
GLOBULIN SER-MCNC: 3.5 GM/DL (ref 2.4–3.5)
GLUCOSE SERPL-MCNC: 100 MG/DL (ref 74–100)
HCT VFR BLD AUTO: 48.2 % (ref 42–52)
HGB BLD-MCNC: 15.3 G/DL (ref 14–18)
IMM GRANULOCYTES # BLD AUTO: 0.01 X10(3)/MCL (ref 0–0.04)
IMM GRANULOCYTES NFR BLD AUTO: 0.1 %
LIPASE SERPL-CCNC: 16 U/L
LYMPHOCYTES # BLD AUTO: 2.33 X10(3)/MCL (ref 0.6–4.6)
LYMPHOCYTES NFR BLD AUTO: 22.4 %
MCH RBC QN AUTO: 28.3 PG (ref 27–31)
MCHC RBC AUTO-ENTMCNC: 31.7 G/DL (ref 33–36)
MCV RBC AUTO: 89.1 FL (ref 80–94)
MONOCYTES # BLD AUTO: 0.4 X10(3)/MCL (ref 0.1–1.3)
MONOCYTES NFR BLD AUTO: 3.9 %
NEUTROPHILS # BLD AUTO: 6.81 X10(3)/MCL (ref 2.1–9.2)
NEUTROPHILS NFR BLD AUTO: 65.6 %
PLATELET # BLD AUTO: 302 X10(3)/MCL (ref 130–400)
PMV BLD AUTO: 11 FL (ref 7.4–10.4)
POC CARDIAC TROPONIN I: 0 NG/ML (ref 0–0.08)
POTASSIUM SERPL-SCNC: 4.3 MMOL/L (ref 3.5–5.1)
PROT SERPL-MCNC: 8 GM/DL (ref 6.4–8.3)
RBC # BLD AUTO: 5.41 X10(6)/MCL (ref 4.7–6.1)
SAMPLE: NORMAL
SODIUM SERPL-SCNC: 139 MMOL/L (ref 136–145)
WBC # SPEC AUTO: 10.38 X10(3)/MCL (ref 4.5–11.5)

## 2023-06-26 PROCEDURE — 85025 COMPLETE CBC W/AUTO DIFF WBC: CPT | Performed by: EMERGENCY MEDICINE

## 2023-06-26 PROCEDURE — 93010 EKG 12-LEAD: ICD-10-PCS | Mod: ,,, | Performed by: STUDENT IN AN ORGANIZED HEALTH CARE EDUCATION/TRAINING PROGRAM

## 2023-06-26 PROCEDURE — 83690 ASSAY OF LIPASE: CPT | Performed by: EMERGENCY MEDICINE

## 2023-06-26 PROCEDURE — 93005 ELECTROCARDIOGRAM TRACING: CPT

## 2023-06-26 PROCEDURE — 99284 EMERGENCY DEPT VISIT MOD MDM: CPT

## 2023-06-26 PROCEDURE — 80053 COMPREHEN METABOLIC PANEL: CPT | Performed by: EMERGENCY MEDICINE

## 2023-06-26 PROCEDURE — 84484 ASSAY OF TROPONIN QUANT: CPT

## 2023-06-26 PROCEDURE — 25000003 PHARM REV CODE 250: Performed by: EMERGENCY MEDICINE

## 2023-06-26 PROCEDURE — 93010 ELECTROCARDIOGRAM REPORT: CPT | Mod: ,,, | Performed by: STUDENT IN AN ORGANIZED HEALTH CARE EDUCATION/TRAINING PROGRAM

## 2023-06-26 RX ORDER — LIDOCAINE HYDROCHLORIDE 20 MG/ML
15 SOLUTION OROPHARYNGEAL ONCE
Status: DISCONTINUED | OUTPATIENT
Start: 2023-06-26 | End: 2023-06-26

## 2023-06-26 RX ORDER — PANTOPRAZOLE SODIUM 40 MG/1
40 TABLET, DELAYED RELEASE ORAL DAILY
Qty: 30 TABLET | Refills: 1 | Status: SHIPPED | OUTPATIENT
Start: 2023-06-26 | End: 2023-10-16

## 2023-06-26 RX ORDER — MAG HYDROX/ALUMINUM HYD/SIMETH 200-200-20
30 SUSPENSION, ORAL (FINAL DOSE FORM) ORAL ONCE
Status: DISCONTINUED | OUTPATIENT
Start: 2023-06-26 | End: 2023-06-26

## 2023-06-26 RX ORDER — MAG HYDROX/ALUMINUM HYD/SIMETH 200-200-20
30 SUSPENSION, ORAL (FINAL DOSE FORM) ORAL ONCE
Status: COMPLETED | OUTPATIENT
Start: 2023-06-26 | End: 2023-06-26

## 2023-06-26 RX ADMIN — ALUMINUM HYDROXIDE, MAGNESIUM HYDROXIDE, AND SIMETHICONE 30 ML: 200; 200; 20 SUSPENSION ORAL at 09:06

## 2023-06-26 NOTE — Clinical Note
"Fredi Meza" Keiry was seen and treated in our emergency department on 6/26/2023.  He may return to work on 06/27/2023.       If you have any questions or concerns, please don't hesitate to call.      Dr Rivera/KIM RN    "

## 2023-06-26 NOTE — ED PROVIDER NOTES
Encounter Date: 6/26/2023       History     Chief Complaint   Patient presents with    Chest Pain     For 3 days getting mid chest pain at night mostly/ worse with deep breath and movement/ at the end of triage states he also has  mid abd pain with a lot of gas for 3 days     This 43-year-old man presents to the emergency room with midsternal chest pain which has been occurring at night for the past 3 days.  He states that he has had to get up and has been unable to go back to bed.  This morning it continued and so he came in.  He also reports a lot of flatulence.  He states the pain is worse with deep breaths and movements.  He denies fever or cough.   He states the pain is worse with deep breaths and movement  Review of patient's allergies indicates:  No Known Allergies  Past Medical History:   Diagnosis Date    Anemia     Gastric ulcer     Hypertension      Past Surgical History:   Procedure Laterality Date    COLONOSCOPY  2022    UPPER GASTROINTESTINAL ENDOSCOPY       Family History   Problem Relation Age of Onset    Diabetes Father     Heart disease Maternal Grandfather      Social History     Tobacco Use    Smoking status: Never     Passive exposure: Never    Smokeless tobacco: Never   Substance Use Topics    Alcohol use: Yes     Alcohol/week: 14.0 standard drinks     Types: 14 Cans of beer per week     Comment: 2 daily    Drug use: Not Currently     Review of Systems   Constitutional:  Negative for diaphoresis and fever.   HENT:  Negative for sore throat.    Respiratory:  Negative for cough and shortness of breath.    Cardiovascular:  Positive for chest pain.   Gastrointestinal:  Positive for abdominal pain (epigastric). Negative for constipation, diarrhea, nausea and vomiting.   Genitourinary:  Negative for dysuria.   Musculoskeletal:  Negative for back pain.   Skin:  Negative for rash.   Neurological:  Negative for weakness.   Hematological:  Does not bruise/bleed easily.     Physical Exam     Initial  Vitals [06/26/23 0833]   BP Pulse Resp Temp SpO2   (!) 168/114 77 20 97.6 °F (36.4 °C) 97 %      MAP       --         Physical Exam    Constitutional: He appears well-developed and well-nourished.   HENT:   Head: Normocephalic and atraumatic.   Mouth/Throat: Mucous membranes are normal.   Eyes: EOM are normal. Pupils are equal, round, and reactive to light.   Neck: Neck supple.   Normal range of motion.  Cardiovascular:  Normal rate, regular rhythm, normal heart sounds and intact distal pulses.           Pulmonary/Chest: Breath sounds normal.   Abdominal: Abdomen is soft. Bowel sounds are normal. There is abdominal tenderness (RUQ, Epigastric & LUQ).   Musculoskeletal:         General: Normal range of motion.      Cervical back: Normal range of motion and neck supple.     Neurological: He is alert and oriented to person, place, and time. He has normal strength.   Skin: Skin is warm and dry. Capillary refill takes less than 2 seconds.   Psychiatric: He has a normal mood and affect. His behavior is normal. Judgment and thought content normal.       ED Course   Procedures  Labs Reviewed   COMPREHENSIVE METABOLIC PANEL - Abnormal; Notable for the following components:       Result Value    Alanine Aminotransferase 57 (*)     All other components within normal limits   CBC WITH DIFFERENTIAL - Abnormal; Notable for the following components:    MCHC 31.7 (*)     MPV 11.0 (*)     All other components within normal limits   LIPASE - Normal   CBC W/ AUTO DIFFERENTIAL    Narrative:     The following orders were created for panel order CBC auto differential.  Procedure                               Abnormality         Status                     ---------                               -----------         ------                     CBC with Differential[[id number]]        Abnormal            Final result                 Please view results for these tests on the individual orders.   TROPONIN ISTAT   POCT TROPONIN     EKG Readings:  (Independently Interpreted)   Rhythm: Normal Sinus Rhythm. Heart Rate: 71. Ectopy: No Ectopy. Conduction: Normal. ST Segments: Normal ST Segments. T Waves: Normal. Clinical Impression: Normal Sinus Rhythm   6/26/23 0835   ECG Results              EKG 12-lead (In process)  Result time 06/26/23 10:05:50      In process by Interface, Lab In [facility] (06/26/23 10:05:50)                   Narrative:    Test Reason : R07.9,    Vent. Rate : 071 BPM     Atrial Rate : 071 BPM     P-R Int : 180 ms          QRS Dur : 086 ms      QT Int : 382 ms       P-R-T Axes : 005 016 033 degrees     QTc Int : 415 ms    Normal sinus rhythm  Normal ECG  When compared with ECG of 01-MAR-2023 08:26,  No significant change was found    Referred By: [email or web address]   SELF           Confirmed By:                                   Imaging Results    None          Medications   aluminum-magnesium hydroxide-simethicone 200-200-20 mg/5 mL suspension 30 mL (30 mLs Oral Given 6/26/23 0935)                              Clinical Impression:   Final diagnoses:  [R07.9] Chest pain  [K21.9] Gastroesophageal reflux disease, unspecified whether esophagitis present (Primary)  [I10] Hypertension, unspecified type        ED Disposition Condition    Discharge Stable          ED Prescriptions       Medication Sig Dispense Start Date End Date Auth. Provider    pantoprazole (PROTONIX) 40 MG tablet Take 1 tablet (40 mg total) by mouth once daily. 30 tablet 6/26/2023 8/25/2023 David Rivera MD          Follow-up Information       Follow up With Specialties Details Why Contact Info    ROSELINE Edmond Nurse Practitioner Schedule an appointment as soon as possible for a visit   [address] [facility]  Internal Medicine Clinic  [facility] [address] [phone]               David Rivera MD  06/26/23 3300

## 2023-07-06 ENCOUNTER — HOSPITAL ENCOUNTER (OUTPATIENT)
Dept: CARDIOLOGY | Facility: HOSPITAL | Age: 43
Discharge: HOME | End: 2023-07-06
Payer: MEDICAID

## 2023-07-06 VITALS
HEIGHT: 65 IN | BODY MASS INDEX: 29.97 KG/M2 | HEART RATE: 78 BPM | SYSTOLIC BLOOD PRESSURE: 148 MMHG | WEIGHT: 179.88 LBS | RESPIRATION RATE: 20 BRPM | DIASTOLIC BLOOD PRESSURE: 103 MMHG

## 2023-07-06 DIAGNOSIS — R07.89 ATYPICAL CHEST PAIN: ICD-10-CM

## 2023-07-06 PROCEDURE — 93017 CV STRESS TEST TRACING ONLY: CPT

## 2023-07-07 LAB
CV STRESS BASE HR: 78 BPM
DIASTOLIC BLOOD PRESSURE: 103 MMHG
OHS CV CPX 85 PERCENT MAX PREDICTED HEART RATE MALE: 152
OHS CV CPX ESTIMATED METS: 15
OHS CV CPX MAX PREDICTED HEART RATE: 179
OHS CV CPX PATIENT IS FEMALE: 0
OHS CV CPX PATIENT IS MALE: 1
OHS CV CPX PEAK DIASTOLIC BLOOD PRESSURE: 105 MMHG
OHS CV CPX PEAK HEAR RATE: 162 BPM
OHS CV CPX PEAK RATE PRESSURE PRODUCT: NORMAL
OHS CV CPX PEAK SYSTOLIC BLOOD PRESSURE: 208 MMHG
OHS CV CPX PERCENT MAX PREDICTED HEART RATE ACHIEVED: 91
OHS CV CPX RATE PRESSURE PRODUCT PRESENTING: NORMAL
STRESS ECHO POST EXERCISE DUR MIN: 12 MINUTES
STRESS ECHO POST EXERCISE DUR SEC: 24 SECONDS
SYSTOLIC BLOOD PRESSURE: 148 MMHG

## 2023-07-11 ENCOUNTER — HOSPITAL ENCOUNTER (EMERGENCY)
Facility: HOSPITAL | Age: 43
Discharge: HOME | End: 2023-07-11
Attending: FAMILY MEDICINE
Payer: MEDICAID

## 2023-07-11 VITALS
HEART RATE: 78 BPM | DIASTOLIC BLOOD PRESSURE: 99 MMHG | RESPIRATION RATE: 18 BRPM | BODY MASS INDEX: 27.11 KG/M2 | HEIGHT: 69 IN | TEMPERATURE: 98 F | OXYGEN SATURATION: 95 % | WEIGHT: 183 LBS | SYSTOLIC BLOOD PRESSURE: 158 MMHG

## 2023-07-11 DIAGNOSIS — I10 PRIMARY HYPERTENSION: ICD-10-CM

## 2023-07-11 DIAGNOSIS — H65.01 NON-RECURRENT ACUTE SEROUS OTITIS MEDIA OF RIGHT EAR: Primary | ICD-10-CM

## 2023-07-11 PROCEDURE — 63600175 PHARM REV CODE 636 W HCPCS: Performed by: FAMILY MEDICINE

## 2023-07-11 PROCEDURE — 96372 THER/PROPH/DIAG INJ SC/IM: CPT | Performed by: FAMILY MEDICINE

## 2023-07-11 PROCEDURE — 99284 EMERGENCY DEPT VISIT MOD MDM: CPT

## 2023-07-11 RX ORDER — AMOXICILLIN AND CLAVULANATE POTASSIUM 875; 125 MG/1; MG/1
1 TABLET, FILM COATED ORAL 2 TIMES DAILY
Qty: 14 TABLET | Refills: 0 | OUTPATIENT
Start: 2023-07-11 | End: 2023-11-16

## 2023-07-11 RX ORDER — KETOROLAC TROMETHAMINE 30 MG/ML
60 INJECTION, SOLUTION INTRAMUSCULAR; INTRAVENOUS
Status: COMPLETED | OUTPATIENT
Start: 2023-07-11 | End: 2023-07-11

## 2023-07-11 RX ORDER — KETOROLAC TROMETHAMINE 10 MG/1
10 TABLET, FILM COATED ORAL EVERY 6 HOURS
Qty: 15 TABLET | Refills: 0 | Status: SHIPPED | OUTPATIENT
Start: 2023-07-11 | End: 2023-07-16

## 2023-07-11 RX ADMIN — KETOROLAC TROMETHAMINE 60 MG: 30 INJECTION, SOLUTION INTRAMUSCULAR; INTRAVENOUS at 08:07

## 2023-07-11 NOTE — DISCHARGE INSTRUCTIONS
Recommend take blood pressure medicines as prescribed follow up with PCP this week to recheck blood pressure dash diet take meds for ear infection return to ER if any change in condition

## 2023-07-11 NOTE — ED PROVIDER NOTES
Encounter Date: 7/11/2023       History     Chief Complaint   Patient presents with    Otalgia     Rt side head pain over the rt ear ache for 2 days     43-year-old presents complaining of right-sided earache for the last 2 days no fevers no chills      Review of patient's allergies indicates:  No Known Allergies  Past Medical History:   Diagnosis Date    Anemia     Gastric ulcer     Hypertension      Past Surgical History:   Procedure Laterality Date    COLONOSCOPY  2022    UPPER GASTROINTESTINAL ENDOSCOPY       Family History   Problem Relation Age of Onset    Diabetes Father     Heart disease Maternal Grandfather      Social History     Tobacco Use    Smoking status: Never     Passive exposure: Never    Smokeless tobacco: Never   Substance Use Topics    Alcohol use: Yes     Alcohol/week: 14.0 standard drinks     Types: 14 Cans of beer per week     Comment: 2 daily    Drug use: Not Currently     Review of Systems   Constitutional:  Negative for fever.   HENT:  Positive for ear pain.    All other systems reviewed and are negative.    Physical Exam     Initial Vitals [07/11/23 0819]   BP Pulse Resp Temp SpO2   (!) 164/116 80 16 98 °F (36.7 °C) 95 %      MAP       --         Physical Exam    Nursing note and vitals reviewed.  Constitutional: He appears well-developed and well-nourished. He is active.   HENT:   Head: Normocephalic and atraumatic.   Nose: Nose normal.   Mouth/Throat: Oropharynx is clear and moist.   Right ear bulging TM loss of landmarks   Eyes: Conjunctivae, EOM and lids are normal. Pupils are equal, round, and reactive to light.   Neck: Trachea normal and phonation normal. Neck supple. No thyroid mass present.   Normal range of motion.  Cardiovascular:  Normal rate, regular rhythm, normal heart sounds and normal pulses.           Pulmonary/Chest: Breath sounds normal.   Abdominal: Abdomen is soft.   Musculoskeletal:         General: Normal range of motion.      Cervical back: Normal range of motion  and neck supple.     Neurological: He is alert and oriented to person, place, and time.   Skin: Skin is warm, dry and intact.   Psychiatric: He has a normal mood and affect. His speech is normal and behavior is normal. Judgment and thought content normal. Cognition and memory are normal.       ED Course   Procedures  Labs Reviewed - No data to display       Imaging Results    None          Medications   ketorolac injection 60 mg (60 mg Intramuscular Given 7/11/23 0827)     Medical Decision Making:   Initial Assessment:   43-year-old male presents with right-sided ear pain has been hurting for the last 2 days denies any other symptoms no fevers no chills no cough no chest pain no shortness of breath no sore throat on exam was unremarkable except for otitis media on the right his blood pressure initially was elevated denies any headache shortness of breath chest pain does take blood pressure medicines says he did not taken this morning encouraged patient to take his blood pressure medicines be consistent with his diet and follow up with his doctor on his blood pressure  Differential Diagnosis:   Otitis media otitis externa viral versus bacteria hypertensive crisis versus essential hypertension noncompliant with medicines noncompliant with diet  ED Management:  Instructed patient to follow up with his PCP take blood pressure medicines follow dash diet take medicines were infection follow up in 2 days it is PCP                        Clinical Impression:   Final diagnoses:  [H65.01] Non-recurrent acute serous otitis media of right ear (Primary)  [I10] Primary hypertension        ED Disposition Condition    Discharge Stable          ED Prescriptions       Medication Sig Dispense Start Date End Date Auth. Provider    amoxicillin-clavulanate 875-125mg (AUGMENTIN) 875-125 mg per tablet Take 1 tablet by mouth 2 (two) times daily. 14 tablet 7/11/2023 -- Berhane Lemus MD    ketorolac (TORADOL) 10 mg tablet Take 1 tablet  (10 mg total) by mouth every 6 (six) hours. for 5 days 15 tablet 7/11/2023 7/16/2023 Berhane Lemus MD          Follow-up Information       Follow up With Specialties Details Why Contact Info    ROSELINE Edmond Nurse Practitioner In 2 days  [address] [facility]  Internal Medicine Clinic  [facility] [address] [phone]               Berhane Lemus MD  07/11/23 0907

## 2023-07-14 ENCOUNTER — HOSPITAL ENCOUNTER (EMERGENCY)
Facility: HOSPITAL | Age: 43
Discharge: HOME | End: 2023-07-14
Attending: EMERGENCY MEDICINE
Payer: MEDICAID

## 2023-07-14 VITALS
WEIGHT: 180 LBS | RESPIRATION RATE: 18 BRPM | HEART RATE: 71 BPM | BODY MASS INDEX: 26.66 KG/M2 | TEMPERATURE: 99 F | HEIGHT: 69 IN | DIASTOLIC BLOOD PRESSURE: 96 MMHG | SYSTOLIC BLOOD PRESSURE: 153 MMHG | OXYGEN SATURATION: 99 %

## 2023-07-14 DIAGNOSIS — H92.01 OTALGIA OF RIGHT EAR: Primary | ICD-10-CM

## 2023-07-14 PROCEDURE — 99283 EMERGENCY DEPT VISIT LOW MDM: CPT

## 2023-07-14 RX ORDER — NEOMYCIN SULFATE, POLYMYXIN B SULFATE AND HYDROCORTISONE 10; 3.5; 1 MG/ML; MG/ML; [USP'U]/ML
3 SUSPENSION/ DROPS AURICULAR (OTIC) 3 TIMES DAILY
Qty: 20 ML | Refills: 0 | Status: SHIPPED | OUTPATIENT
Start: 2023-07-14 | End: 2023-07-21

## 2023-07-14 NOTE — Clinical Note
"Fredi Panbabs Ricci was seen and treated in our emergency department on 7/14/2023.  He may return to work on 07/17/2023.       If you have any questions or concerns, please don't hesitate to call.      Kalani Eller NP"

## 2023-07-14 NOTE — ED PROVIDER NOTES
Encounter Date: 7/14/2023       History     Chief Complaint   Patient presents with    Otalgia     Pt was seen here in the ER on 7/11, Pt on currently on ATB amoxicillin, pt states the pain is a 10/10, pt denies any drainage, pt c/o HA.      44 yo male with h/o HTN, GERD, anemia presents with c/o right ear pain.  Onset x3 days ago.  No provocative or palliative factors reported.  Pt denies fever, hearing changes, cough and congestion, chills, body aches.  He was seen in the ED 3 days ago for the same complaint and prescribed amoxicillin for his ear pain - he reports compliance with the medication, however pain continues.    The history is provided by the patient. No  was used.   Review of patient's allergies indicates:  No Known Allergies  Past Medical History:   Diagnosis Date    Anemia     Gastric ulcer     Hypertension      Past Surgical History:   Procedure Laterality Date    COLONOSCOPY  2022    UPPER GASTROINTESTINAL ENDOSCOPY       Family History   Problem Relation Age of Onset    Diabetes Father     Heart disease Maternal Grandfather      Social History     Tobacco Use    Smoking status: Never     Passive exposure: Never    Smokeless tobacco: Never   Substance Use Topics    Alcohol use: Yes     Alcohol/week: 14.0 standard drinks     Types: 14 Cans of beer per week     Comment: 2 daily    Drug use: Not Currently     Review of Systems   Constitutional:  Negative for appetite change, chills and fever.   HENT:  Positive for ear pain. Negative for ear discharge, facial swelling, hearing loss, rhinorrhea and sore throat.    Respiratory:  Negative for shortness of breath.    Cardiovascular:  Negative for chest pain.   Gastrointestinal:  Negative for nausea.   Genitourinary:  Negative for dysuria.   Musculoskeletal:  Negative for back pain.   Skin:  Negative for rash.   Neurological:  Negative for weakness.   Hematological:  Does not bruise/bleed easily.     Physical Exam     Initial Vitals   BP  Pulse Resp Temp SpO2   07/14/23 1436 07/14/23 1436 07/14/23 1436 07/14/23 1436 07/14/23 1457   (!) 168/121 77 18 99 °F (37.2 °C) 99 %      MAP       --                Physical Exam    Constitutional: He appears well-developed and well-nourished.   HENT:   Head: Normocephalic and atraumatic.   Right Ear: Hearing, external ear and ear canal normal. No mastoid tenderness. Tympanic membrane is not injected, not scarred, not perforated, not erythematous, not retracted and not bulging. A middle ear effusion is present. No hemotympanum.   Left Ear: Hearing, tympanic membrane, external ear and ear canal normal.   Mouth/Throat: Oropharynx is clear and moist.   Eyes: Conjunctivae and EOM are normal. Pupils are equal, round, and reactive to light.   Neck: Neck supple.   Normal range of motion.  Cardiovascular:  Normal rate and regular rhythm.           Pulmonary/Chest: Breath sounds normal.   Musculoskeletal:         General: Normal range of motion.      Cervical back: Normal range of motion and neck supple.     Neurological: He is alert and oriented to person, place, and time. He has normal strength.   Skin: Skin is warm and dry.   Psychiatric: He has a normal mood and affect.       ED Course   Procedures  Labs Reviewed - No data to display       Imaging Results    None          Medications - No data to display  Medical Decision Making:   Differential Diagnosis:   Right otitis externa, right otitis media, right middle ear effusion  ED Management:  44 yo male with h/o HTN, GERD, anemia presents with c/o right ear pain.  Onset x3 days ago.  No provocative or palliative factors reported.  Pt denies fever, hearing changes, cough and congestion, chills, body aches.  He was seen in the ED 3 days ago for the same complaint and prescribed amoxicillin for his ear pain - he reports compliance with the medication, however pain continues.  Physical exam as documented.  Pt does have a small amount of cerumen in the right ear canal -  flushed with warm water and H2O2, pt tolerated well.  I've instructed pt to continue taking amoxicillin as prescribed.  I will also prescribe drops to treat for inflammation and infection of the canal s/t flushing ear in the ED.  I suggested he begin taking an OTC allergy medication daily to help with his middle ear effusion.  He is to follow up with his PCP so that he may be referred to ENT for continued ear pain.  Pt verbalized understanding and agrees with plan of care.                        Clinical Impression:   Final diagnoses:  [H92.01] Otalgia of right ear (Primary)        ED Disposition Condition    Discharge Stable          ED Prescriptions       Medication Sig Dispense Start Date End Date Auth. Provider    neomycin-polymyxin-hydrocortisone (CORTISPORIN) 3.5-10,000-1 mg/mL-unit/mL-% otic suspension Place 3 drops into the right ear 3 (three) times daily. for 7 days 20 mL 7/14/2023 7/21/2023 Kalani Eller NP          Follow-up Information       Follow up With Specialties Details Why Contact Info    ROSELINE Edmond Nurse Practitioner In 1 week For ED follow-up [address] [facility]  Internal Medicine Clinic  [facility] [id number] [phone]               Kalani Eller NP  07/14/23 4474

## 2023-10-11 NOTE — PROGRESS NOTES
CHIEF COMPLAINT:   Chief Complaint   Patient presents with    f/u denies chest pain or sob since LV no questions     Follow-up     4 mos f/u w st sob w/wo exertion                                                  HPI:  Fredi Ricci 43 y.o. male who has a past medical history of Anemia, Gastric ulcer, and Hypertension who presents to Cardiology Clinic today for follow up and ongoing care..  He was initially referred to cardiology clinic after being seen in the emergency department 03/01/2023 for chest pain.  At last appointment patient was still complaining of ongoing chest pain.  He described the chest pain as right-sided stabbing and sharp rated 10/10 in severity.  He also complained of dyspnea while at rest, leg swelling.  He also stated that he was under lot of stress at the time due to making sure that bills are paid and his children are cared for. Otherwise he denied any other cardiac complaints. Exercise stress EKG ordered at last office visit.  Exercise chest EKG revealed no evidence of ischemia or infarction.  She referral report below.    Today patient endorses ongoing dyspnea and shortness of breath with and without exertion.  He states that it occasionally does interfere with his ADLs and typically occurs with mild-to-moderate exertion.  He denies any further chest pain, palpitations, orthopnea, lightheadedness, dizziness, or syncope.  He does state that he has had a few occasions where he had PND, snoring, excessive daytime sleepiness, and generalized fatigue.  He states that he is able to complete his job duties and house chores.  Physical activity is limited due to shortness of breath.  He denies any tobacco use.  He reports compliance with all medications.      Denies smoking. Does drink alcohol, 1-2 beers a day. Denies use of any illicit substances.   Does report unspecified family history in maternal granmothers side.                                                                                                                                                                                                                                                                                                                                                                                                                                                                                   CARDIAC TESTING:  No results found for this or any previous visit.    Results for orders placed during the hospital encounter of 07/06/23    Exercise Stress - EKG    Interpretation Summary    The ECG portion of the study is negative for ischemia.    The patient reported no chest pain during the stress test.    The patient exercised for 12 minutes 24 seconds on a Gen protocol, corresponding to a functional capacity of 15 METS, achieving a peak heart rate of 162 bpm, which is 91 % of the age predicted maximum heart rate.     No results found for this or any previous visit.       Patient Active Problem List   Diagnosis    Normocytic anemia    Gastric ulcer due to nonsteroidal antiinflammatory drug (NSAID) therapy    Chronic constipation    Primary hypertension    Headache    IFG (impaired fasting glucose)    Overweight with body mass index (BMI) of 29 to 29.9 in adult    Heel pain    Anxiety and depression    Other chest pain     Past Surgical History:   Procedure Laterality Date    COLONOSCOPY  2022    UPPER GASTROINTESTINAL ENDOSCOPY       Social History     Socioeconomic History    Marital status: Single   Tobacco Use    Smoking status: Never     Passive exposure: Never    Smokeless tobacco: Never   Substance and Sexual Activity    Alcohol use: Yes     Alcohol/week: 14.0 standard drinks of alcohol     Types: 14 Cans of beer per week     Comment: 2 daily    Drug use: Not Currently     Social Determinants of Health     Transportation Needs: No Transportation Needs (1/27/2023)    PRAPARE - Transportation     Lack of Transportation  "(Medical): No     Lack of Transportation (Non-Medical): No   Housing Stability: Low Risk  (1/27/2023)    Housing Stability Vital Sign     Unable to Pay for Housing in the Last Year: No     Number of Places Lived in the Last Year: 1     Unstable Housing in the Last Year: No        Family History   Problem Relation Age of Onset    Diabetes Father     Heart disease Maternal Grandfather      Review of patient's allergies indicates:  No Known Allergies      ROS:  Review of Systems   Constitutional:  Positive for malaise/fatigue.   HENT: Negative.     Eyes: Negative.    Respiratory:  Positive for shortness of breath.    Cardiovascular:  Positive for PND. Negative for chest pain, palpitations, orthopnea, claudication and leg swelling.   Gastrointestinal: Negative.    Genitourinary: Negative.    Musculoskeletal: Negative.    Skin: Negative.    Neurological: Negative.  Negative for dizziness and weakness.   Endo/Heme/Allergies: Negative.    Psychiatric/Behavioral: Negative.                                                                                                                                                                                  Negative except as stated in the history of present illness. See HPI for details.    PHYSICAL EXAM:  Visit Vitals  BP (!) 164/116 (BP Location: Left arm, Patient Position: Sitting, BP Method: Medium (Automatic))   Pulse 71   Temp 97.7 °F (36.5 °C) (Oral)   Resp 20   Ht 5' 9" (1.753 m)   Wt 84.6 kg (186 lb 9.6 oz)   SpO2 98%   BMI 27.56 kg/m²       Physical Exam  Constitutional:       Appearance: Normal appearance.   HENT:      Head: Normocephalic.      Mouth/Throat:      Mouth: Mucous membranes are moist.   Eyes:      Extraocular Movements: Extraocular movements intact.   Neck:      Vascular: No carotid bruit.   Cardiovascular:      Rate and Rhythm: Normal rate and regular rhythm.      Pulses: Normal pulses.      Heart sounds: Normal heart sounds.   Pulmonary:      Effort: " Pulmonary effort is normal. No respiratory distress.      Breath sounds: Normal breath sounds.   Musculoskeletal:         General: Normal range of motion.      Right lower leg: No edema.      Left lower leg: No edema.   Skin:     General: Skin is warm and dry.   Neurological:      General: No focal deficit present.      Mental Status: He is alert and oriented to person, place, and time.   Psychiatric:         Mood and Affect: Mood normal.         Behavior: Behavior normal.         Current Outpatient Medications   Medication Instructions    amoxicillin-clavulanate 875-125mg (AUGMENTIN) 875-125 mg per tablet 1 tablet, Oral, 2 times daily    butalbital-acetaminophen-caffeine -40 mg (FIORICET, ESGIC) -40 mg per tablet 1 tablet, Oral, Every 6 hours PRN    dicyclomine (BENTYL) 20 mg, Oral, Every 6 hours    ferrous sulfate 325 mg, Oral, Daily    hydrOXYzine pamoate (VISTARIL) 50 mg, Oral, Every 6 hours PRN    losartan (COZAAR) 25 mg, Oral, Daily    NIFEdipine (PROCARDIA-XL) 90 mg, Oral, Daily    ondansetron (ZOFRAN-ODT) 4 mg, Oral, Every 8 hours PRN    pantoprazole (PROTONIX) 40 mg, Oral, Daily    propranoloL (INDERAL) 40 mg, Oral, 2 times daily    sertraline (ZOLOFT) 25 mg, Oral, Nightly    sumatriptan (IMITREX) 25 mg, Oral, Daily PRN, May repeat in 2 hrs x 1 dose if initial dose ineffective    topiramate (TOPAMAX) 25 mg, Oral, 2 times daily, Take at bedtime for the first 7 days and then increase to BID.        All medications, laboratory studies, cardiac diagnostic imaging reviewed.     Lab Results   Component Value Date    .00 01/27/2023     (H) 03/25/2019    TRIG 130 01/27/2023    TRIG 160 (H) 12/21/2022    CREATININE 0.85 06/26/2023    K 4.3 06/26/2023        ASSESSMENT/PLAN:  Atypical Chest Pain-Resolved  - Resolved   - Acutely negative troponins and normal EKG in previous ED workup   - Has been asymptomatic for the past several months.  - TST negative for any ischemia or infarction (See  full report above, 7.6.23)    Dyspnea on Exertion/Shortness of Breath  - States that this has been present for several months  - Occurs with min-mod exertion  - Occasionally interferes with ADLs/work  - Is a limiting factor in physical activity   - Will order Echo for further evaluation of valvular/structural heart disease  - TST negative for ischemia or infarction     Hypertension  - BP above goal today  - Reports compliance with all medications  - States that he had migraine this AM and took BC powder, drank a coke, and had piece of fried chicken    - Continue current medication regimen of Procardia 90 mg, Losartan 25, Propranolol 40  - Will have patient log BP for 2-3 weeks and call with readings   - Counseled on importance of following a low sodium, heart healthy diet, and exercise as tolerated     Headaches  - Patient on Topamax, Fioricet, and sumatriptan.  - States that above medications are not working well for him  - Advised patient to discuss with PCP - possible need for Neurology referral     PND/Daytime Sleepiness/Fatigue/Snoring  - Reports symptoms of PND, excessive daytime sleepiness, fatigue, snoring  - Given above symptoms and uncontrolled BP despite multiple medications, concerns for underlying ASHER  - Will have patient complete sleep study      RCRI is 0.  Based on the RCRI This patient is 30 day cardiac risk of death, MI, or cardiac arrest is low for low risk surgery.     Complete Echo   Complete sleep study   Follow up in cardiology clinic in 4 months or sooner if needed   Follow up with PCP as directed

## 2023-10-16 ENCOUNTER — OFFICE VISIT (OUTPATIENT)
Dept: CARDIOLOGY | Facility: CLINIC | Age: 43
End: 2023-10-16
Payer: MEDICAID

## 2023-10-16 VITALS
HEART RATE: 71 BPM | OXYGEN SATURATION: 98 % | RESPIRATION RATE: 20 BRPM | BODY MASS INDEX: 27.64 KG/M2 | WEIGHT: 186.63 LBS | HEIGHT: 69 IN | TEMPERATURE: 98 F | SYSTOLIC BLOOD PRESSURE: 165 MMHG | DIASTOLIC BLOOD PRESSURE: 115 MMHG

## 2023-10-16 DIAGNOSIS — R06.00 PND (PAROXYSMAL NOCTURNAL DYSPNEA): ICD-10-CM

## 2023-10-16 DIAGNOSIS — G47.19 EXCESSIVE DAYTIME SLEEPINESS: ICD-10-CM

## 2023-10-16 DIAGNOSIS — I10 PRIMARY HYPERTENSION: Chronic | ICD-10-CM

## 2023-10-16 DIAGNOSIS — R06.83 LOUD SNORING: ICD-10-CM

## 2023-10-16 DIAGNOSIS — R06.02 SHORTNESS OF BREATH: Primary | ICD-10-CM

## 2023-10-16 PROCEDURE — 99215 OFFICE O/P EST HI 40 MIN: CPT | Mod: PBBFAC

## 2023-10-16 RX ORDER — DICYCLOMINE HYDROCHLORIDE 20 MG/1
20 TABLET ORAL EVERY 6 HOURS
COMMUNITY
Start: 2023-08-09

## 2023-10-16 RX ORDER — ONDANSETRON 4 MG/1
4 TABLET, ORALLY DISINTEGRATING ORAL EVERY 8 HOURS PRN
COMMUNITY
Start: 2023-08-09

## 2023-10-16 NOTE — PATIENT INSTRUCTIONS
Complete Echo   Complete sleep study   Follow up in cardiology clinic in 4 months or sooner if needed   Follow up with PCP as directed

## 2023-10-17 DIAGNOSIS — G47.33 OSA (OBSTRUCTIVE SLEEP APNEA): Primary | ICD-10-CM

## 2023-10-21 ENCOUNTER — PROCEDURE VISIT (OUTPATIENT)
Dept: SLEEP MEDICINE | Facility: HOSPITAL | Age: 43
End: 2023-10-21
Payer: MEDICAID

## 2023-10-21 DIAGNOSIS — G47.33 OSA (OBSTRUCTIVE SLEEP APNEA): ICD-10-CM

## 2023-10-21 PROCEDURE — 95810 POLYSOM 6/> YRS 4/> PARAM: CPT

## 2023-10-30 DIAGNOSIS — G47.33 OSA ON CPAP: Primary | ICD-10-CM

## 2023-11-16 ENCOUNTER — HOSPITAL ENCOUNTER (EMERGENCY)
Facility: HOSPITAL | Age: 43
Discharge: HOME | End: 2023-11-16
Attending: EMERGENCY MEDICINE
Payer: MEDICAID

## 2023-11-16 VITALS
RESPIRATION RATE: 16 BRPM | DIASTOLIC BLOOD PRESSURE: 115 MMHG | BODY MASS INDEX: 28.32 KG/M2 | SYSTOLIC BLOOD PRESSURE: 175 MMHG | TEMPERATURE: 98 F | OXYGEN SATURATION: 98 % | WEIGHT: 191.81 LBS | HEART RATE: 66 BPM

## 2023-11-16 DIAGNOSIS — H61.20 CERUMEN IN AUDITORY CANAL ON EXAMINATION: ICD-10-CM

## 2023-11-16 DIAGNOSIS — R03.0 ELEVATED BLOOD PRESSURE READING: ICD-10-CM

## 2023-11-16 DIAGNOSIS — H66.91 OM (OTITIS MEDIA), RECURRENT, RIGHT: Primary | ICD-10-CM

## 2023-11-16 LAB
ANION GAP SERPL CALC-SCNC: 7 MEQ/L
BASOPHILS # BLD AUTO: 0.03 X10(3)/MCL
BASOPHILS NFR BLD AUTO: 0.4 %
BUN SERPL-MCNC: 14.1 MG/DL (ref 8.9–20.6)
CALCIUM SERPL-MCNC: 9 MG/DL (ref 8.4–10.2)
CHLORIDE SERPL-SCNC: 105 MMOL/L (ref 98–107)
CO2 SERPL-SCNC: 28 MMOL/L (ref 22–29)
CREAT SERPL-MCNC: 0.85 MG/DL (ref 0.73–1.18)
CREAT/UREA NIT SERPL: 17
EOSINOPHIL # BLD AUTO: 0.33 X10(3)/MCL (ref 0–0.9)
EOSINOPHIL NFR BLD AUTO: 4.5 %
ERYTHROCYTE [DISTWIDTH] IN BLOOD BY AUTOMATED COUNT: 12.5 % (ref 11.5–17)
GFR SERPLBLD CREATININE-BSD FMLA CKD-EPI: >60 MLS/MIN/1.73/M2
GLUCOSE SERPL-MCNC: 94 MG/DL (ref 74–100)
HCT VFR BLD AUTO: 46.6 % (ref 42–52)
HGB BLD-MCNC: 15.1 G/DL (ref 14–18)
HOLD SPECIMEN: NORMAL
IMM GRANULOCYTES # BLD AUTO: 0.01 X10(3)/MCL (ref 0–0.04)
IMM GRANULOCYTES NFR BLD AUTO: 0.1 %
LYMPHOCYTES # BLD AUTO: 2.09 X10(3)/MCL (ref 0.6–4.6)
LYMPHOCYTES NFR BLD AUTO: 28.5 %
MCH RBC QN AUTO: 28.4 PG (ref 27–31)
MCHC RBC AUTO-ENTMCNC: 32.4 G/DL (ref 33–36)
MCV RBC AUTO: 87.8 FL (ref 80–94)
MONOCYTES # BLD AUTO: 0.37 X10(3)/MCL (ref 0.1–1.3)
MONOCYTES NFR BLD AUTO: 5 %
NEUTROPHILS # BLD AUTO: 4.5 X10(3)/MCL (ref 2.1–9.2)
NEUTROPHILS NFR BLD AUTO: 61.5 %
NRBC BLD AUTO-RTO: 0 %
PLATELET # BLD AUTO: 282 X10(3)/MCL (ref 130–400)
PMV BLD AUTO: 11.1 FL (ref 7.4–10.4)
POTASSIUM SERPL-SCNC: 4.1 MMOL/L (ref 3.5–5.1)
RBC # BLD AUTO: 5.31 X10(6)/MCL (ref 4.7–6.1)
SODIUM SERPL-SCNC: 140 MMOL/L (ref 136–145)
WBC # SPEC AUTO: 7.33 X10(3)/MCL (ref 4.5–11.5)

## 2023-11-16 PROCEDURE — 80048 BASIC METABOLIC PNL TOTAL CA: CPT | Performed by: NURSE PRACTITIONER

## 2023-11-16 PROCEDURE — 99283 EMERGENCY DEPT VISIT LOW MDM: CPT

## 2023-11-16 PROCEDURE — 25000003 PHARM REV CODE 250: Performed by: NURSE PRACTITIONER

## 2023-11-16 PROCEDURE — 85025 COMPLETE CBC W/AUTO DIFF WBC: CPT | Performed by: NURSE PRACTITIONER

## 2023-11-16 RX ORDER — AMOXICILLIN AND CLAVULANATE POTASSIUM 875; 125 MG/1; MG/1
1 TABLET, FILM COATED ORAL 2 TIMES DAILY
Qty: 14 TABLET | Refills: 0 | Status: SHIPPED | OUTPATIENT
Start: 2023-11-16

## 2023-11-16 RX ORDER — CLONIDINE HYDROCHLORIDE 0.1 MG/1
0.1 TABLET ORAL
Status: COMPLETED | OUTPATIENT
Start: 2023-11-16 | End: 2023-11-16

## 2023-11-16 RX ADMIN — CLONIDINE HYDROCHLORIDE 0.1 MG: 0.1 TABLET ORAL at 07:11

## 2023-11-16 NOTE — ED PROVIDER NOTES
Encounter Date: 11/16/2023       History     Chief Complaint   Patient presents with    Otalgia     CO HA AND RT EAR PAIN W DECREASE IN HEARING FOR MONTHS.  PT HX OF HTN, COMPLIANT W MED. RECENT INCREASE IN DOSE.     Hypertension     Pt is a 43 y.o. male who presents to the [facility] ED complaining of Rt ear pain and a headache. Reports ear pain has been recurrent for months. Pt has been treated for OM in the past. Denies trauma to ear, dizziness, chest pain, SOB, weakness, fever, or loss of bowel or bladder control. Admits to slight decrease in hearing as well. Pt currently hypertensive. Denies blurry vision but admits to a headache. Says that his PCP recently changed his dose of his HTN medication and he took a dose just prior to arrival.       Review of patient's allergies indicates:  No Known Allergies  Past Medical History:   Diagnosis Date    Anemia     Gastric ulcer     Hypertension      Past Surgical History:   Procedure Laterality Date    COLONOSCOPY  2022    UPPER GASTROINTESTINAL ENDOSCOPY       Family History   Problem Relation Age of Onset    Diabetes Father     Heart disease Maternal Grandfather      Social History     Tobacco Use    Smoking status: Never     Passive exposure: Never    Smokeless tobacco: Never   Substance Use Topics    Alcohol use: Yes     Alcohol/week: 14.0 standard drinks of alcohol     Types: 14 Cans of beer per week     Comment: 2 daily    Drug use: Not Currently     Review of Systems   Constitutional:  Negative for chills, diaphoresis, fatigue and fever.   HENT:  Positive for ear pain. Negative for ear discharge, facial swelling, postnasal drip, rhinorrhea, sinus pressure, sinus pain, sore throat and trouble swallowing.    Respiratory:  Negative for cough, chest tightness, shortness of breath and wheezing.    Cardiovascular:  Negative for chest pain, palpitations and leg swelling.   Gastrointestinal:  Negative for abdominal pain, diarrhea, nausea and vomiting.   Genitourinary:   Negative for dysuria, flank pain, hematuria and urgency.   Musculoskeletal:  Negative for arthralgias, back pain and myalgias.   Skin:  Negative for color change and rash.   Neurological:  Positive for headaches. Negative for dizziness, syncope and weakness.   Hematological:  Does not bruise/bleed easily.   All other systems reviewed and are negative.      Physical Exam     Initial Vitals [11/16/23 0735]   BP Pulse Resp Temp SpO2   (!) 160/116 73 16 97.9 °F (36.6 °C) 98 %      MAP       --         Physical Exam    Nursing note and vitals reviewed.  Constitutional: Vital signs are normal. He appears well-developed and well-nourished.   HENT:   Head: Normocephalic.   Right Ear: There is swelling. A foreign body (Cerumen) is present. Tympanic membrane is erythematous and bulging. Tympanic membrane is not injected and not perforated. A middle ear effusion is present. Decreased hearing is noted.   Nose: Nose normal.   Mouth/Throat: Oropharynx is clear and moist.   Eyes: Conjunctivae and EOM are normal. Pupils are equal, round, and reactive to light.   Neck: Neck supple.   Normal range of motion.  Cardiovascular:  Normal rate, regular rhythm, normal heart sounds and intact distal pulses.           Pulmonary/Chest: Effort normal and breath sounds normal. No respiratory distress. He has no wheezes. He has no rhonchi. He has no rales. He exhibits no tenderness.   Abdominal: Abdomen is soft and flat. Bowel sounds are normal. There is no abdominal tenderness. There is no rebound, no guarding, no tenderness at McBurney's point and negative Choudhary's sign.   Musculoskeletal:         General: Normal range of motion.      Cervical back: Normal range of motion and neck supple.     Neurological: He is alert and oriented to person, place, and time. He has normal strength.   Skin: Skin is warm and dry. Capillary refill takes less than 2 seconds.   Psychiatric: He has a normal mood and affect. His behavior is normal. Judgment and  thought content normal.         ED Course   Procedures  Labs Reviewed   CBC WITH DIFFERENTIAL - Abnormal; Notable for the following components:       Result Value    MCHC 32.4 (*)     MPV 11.1 (*)     All other components within normal limits   BASIC METABOLIC PANEL   CBC W/ AUTO DIFFERENTIAL    Narrative:     The following orders were created for panel order CBC auto differential.  Procedure                               Abnormality         Status                     ---------                               -----------         ------                     CBC with Differential[[id number]]        Abnormal            Final result                 Please view results for these tests on the individual orders.   EXTRA TUBES    Narrative:     The following orders were created for panel order EXTRA TUBES.  Procedure                               Abnormality         Status                     ---------                               -----------         ------                     Light Blue Top Hold[[id number]]                              In process                 Gold Top Hold[[id number]]                                    In process                 Pink Top Hold[[id number]]                                   In process                   Please view results for these tests on the individual orders.   LIGHT BLUE TOP HOLD   GOLD TOP HOLD   PINK TOP HOLD          Imaging Results    None          Medications   cloNIDine tablet 0.1 mg (0.1 mg Oral Given 11/16/23 0753)     Medical Decision Making  Differential:  Cerumen impaction  OM  OE  HTN  Electrolyte imbalance    Amount and/or Complexity of Data Reviewed  Labs: ordered.    Risk  Prescription drug management.               ED Course as of 11/16/23 0828   Thu Nov 16, 2023   0824 8:24 AM Reassessed patient at this time. Reports condition has improved. Discussed with patient all pertinent ED information and results. Discussed diagnosis and treatment plan with patient. Due to recurrent  nature of issue, I will refer pt to ENT Services for further evaluation. Follow up instructions and return to ED instruction have been given. All questions and concerns were addressed at this time. Patient voices understanding of information and instructions. Patient is comfortable with plan and discharge. Patient is stable for discharge.    [JA]      ED Course User Index  [JA] Sudhakar Lima Jr., FNP                    Clinical Impression:   Final diagnoses:  [H66.91] OM (otitis media), recurrent, right (Primary)  [H61.20] Cerumen in auditory canal on examination  [R03.0] Elevated blood pressure reading        ED Disposition Condition    Discharge Stable          ED Prescriptions       Medication Sig Dispense Start Date End Date Auth. Provider    amoxicillin-clavulanate 875-125mg (AUGMENTIN) 875-125 mg per tablet Take 1 tablet by mouth 2 (two) times daily. 14 tablet 11/16/2023 -- Sudhakar Lima Jr., FNP    carbamide peroxide (DEBROX) 6.5 % otic solution Place 5 drops into the right ear 2 (two) times daily. for 4 days 15 mL 11/16/2023 11/20/2023 Sudhakar Lima Jr., FNP          Follow-up Information       Follow up With Specialties Details Why Contact Info    Jimena Carpenter FNP Nurse Practitioner In 3 days  [facility]  Internal Medicine Clinic  [facility] [address] [phone]      [facility] Emergency Medicine In 3 days As needed, If symptoms worsen [address] [place] [id number] [phone]    [facility]  Schedule an appointment as soon as possible for a visit in 5 days For follow up with ENT Clinic [address] [place] [phone]             Sudhakar Lima Jr., FNP  11/16/23 5928

## 2023-11-16 NOTE — DISCHARGE INSTRUCTIONS
Follow up with your primary care physician in 3-5 days for follow up evaluation.  Take medication as prescribed.  Follow up with ENT Clinic at [facility] as instructed.

## 2023-11-19 ENCOUNTER — HOSPITAL ENCOUNTER (EMERGENCY)
Facility: HOSPITAL | Age: 43
Discharge: HOME | End: 2023-11-19
Attending: FAMILY MEDICINE
Payer: MEDICAID

## 2023-11-19 VITALS
OXYGEN SATURATION: 97 % | TEMPERATURE: 98 F | BODY MASS INDEX: 28.33 KG/M2 | DIASTOLIC BLOOD PRESSURE: 101 MMHG | WEIGHT: 191.25 LBS | SYSTOLIC BLOOD PRESSURE: 177 MMHG | RESPIRATION RATE: 20 BRPM | HEIGHT: 69 IN | HEART RATE: 77 BPM

## 2023-11-19 DIAGNOSIS — H92.01 OTALGIA OF RIGHT EAR: ICD-10-CM

## 2023-11-19 DIAGNOSIS — H60.501 ACUTE OTITIS EXTERNA OF RIGHT EAR, UNSPECIFIED TYPE: ICD-10-CM

## 2023-11-19 DIAGNOSIS — H66.90 OTITIS MEDIA, UNSPECIFIED LATERALITY, UNSPECIFIED OTITIS MEDIA TYPE: Primary | ICD-10-CM

## 2023-11-19 PROCEDURE — 63600175 PHARM REV CODE 636 W HCPCS: Performed by: PHYSICIAN ASSISTANT

## 2023-11-19 PROCEDURE — 99284 EMERGENCY DEPT VISIT MOD MDM: CPT

## 2023-11-19 PROCEDURE — 96372 THER/PROPH/DIAG INJ SC/IM: CPT | Performed by: PHYSICIAN ASSISTANT

## 2023-11-19 RX ORDER — CIPROFLOXACIN HYDROCHLORIDE 3 MG/ML
SOLUTION/ DROPS OPHTHALMIC
Qty: 5 ML | Refills: 0 | Status: SHIPPED | OUTPATIENT
Start: 2023-11-19

## 2023-11-19 RX ORDER — METHYLPREDNISOLONE 4 MG/1
TABLET ORAL
Qty: 1 EACH | Refills: 0 | Status: SHIPPED | OUTPATIENT
Start: 2023-11-19

## 2023-11-19 RX ORDER — KETOROLAC TROMETHAMINE 30 MG/ML
60 INJECTION, SOLUTION INTRAMUSCULAR; INTRAVENOUS
Status: COMPLETED | OUTPATIENT
Start: 2023-11-19 | End: 2023-11-19

## 2023-11-19 RX ORDER — IBUPROFEN 800 MG/1
800 TABLET ORAL EVERY 8 HOURS PRN
Qty: 20 TABLET | Refills: 0 | Status: SHIPPED | OUTPATIENT
Start: 2023-11-19

## 2023-11-19 RX ADMIN — KETOROLAC TROMETHAMINE 60 MG: 30 INJECTION, SOLUTION INTRAMUSCULAR at 09:11

## 2023-11-20 NOTE — ED PROVIDER NOTES
Encounter Date: 11/19/2023       History     Chief Complaint   Patient presents with    Otalgia     Was  seen  on  11/16  and  put  on  ear drops  and  amoxicillin  but  patient  states  that  it  is  not  helping     44 YO AAM in ER with complaints of continued right ear pain without improvement with Rx Augmentin. He was diagnosed with an ear infection about 4 days ago and has been taking all meds as prescribed. He was referred to ENT clinic but no appointment has been made at this time. Denies fever, chills, chest pain, SOB, abdominal pain, N/V/D, HA or dizziness. No other complaints.    The history is provided by the patient.     Review of patient's allergies indicates:  No Known Allergies  Past Medical History:   Diagnosis Date    Anemia     Gastric ulcer     Hypertension      Past Surgical History:   Procedure Laterality Date    COLONOSCOPY  2022    UPPER GASTROINTESTINAL ENDOSCOPY       Family History   Problem Relation Age of Onset    Diabetes Father     Heart disease Maternal Grandfather      Social History     Tobacco Use    Smoking status: Never     Passive exposure: Never    Smokeless tobacco: Never   Substance Use Topics    Alcohol use: Yes     Alcohol/week: 14.0 standard drinks of alcohol     Types: 14 Cans of beer per week     Comment: 2 daily    Drug use: Not Currently     Review of Systems   Constitutional:  Negative for chills and fever.   HENT:  Positive for ear pain. Negative for congestion and trouble swallowing.    Respiratory:  Negative for shortness of breath and wheezing.    Cardiovascular:  Negative for chest pain and leg swelling.   Gastrointestinal:  Negative for abdominal pain, diarrhea, nausea and vomiting.   Musculoskeletal:  Negative for joint swelling.   Skin:  Negative for rash.   Neurological:  Negative for dizziness, weakness and headaches.       Physical Exam     Initial Vitals [11/19/23 2040]   BP Pulse Resp Temp SpO2   (!) 177/101 77 20 97.6 °F (36.4 °C) 97 %      MAP       --          Physical Exam    Nursing note and vitals reviewed.  Constitutional: He appears well-developed and well-nourished.   HENT:   Head: Normocephalic and atraumatic.   Right Ear: There is swelling and tenderness. Tympanic membrane is injected, erythematous and bulging.   Canal with erythema, swelling and mild wax, ear flushed for better look at TM with minimal wax removed, TM very erythematous   Eyes: Conjunctivae are normal.   Neck: Neck supple.   Cardiovascular:  Normal rate and regular rhythm.           Pulmonary/Chest: Breath sounds normal.   Musculoskeletal:      Cervical back: Neck supple.     Neurological: He is alert and oriented to person, place, and time.   Skin: Skin is dry.   Psychiatric: He has a normal mood and affect.         ED Course   Procedures  Labs Reviewed - No data to display       Imaging Results    None          Medications   ketorolac injection 60 mg (60 mg Intramuscular Given 11/19/23 2147)     Medical Decision Making  Risk  Prescription drug management.                                   Clinical Impression:  Final diagnoses:  [H66.90] Otitis media, unspecified laterality, unspecified otitis media type (Primary)  [H92.01] Otalgia of right ear  [H60.501] Acute otitis externa of right ear, unspecified type          ED Disposition Condition    Discharge Stable          ED Prescriptions       Medication Sig Dispense Start Date End Date Auth. Provider    ibuprofen (ADVIL,MOTRIN) 800 MG tablet Take 1 tablet (800 mg total) by mouth every 8 (eight) hours as needed for Pain. 20 tablet 11/19/2023 -- Lorne Sherwood PA    ciprofloxacin HCl (CILOXAN) 0.3 % ophthalmic solution 5 drops to right ear every 12 hours X 10 days 5 mL 11/19/2023 -- Lorne Sherwood PA    methylPREDNISolone (MEDROL DOSEPACK) 4 mg tablet Take as package instructs 1 each 11/19/2023 -- Lorne Sherwood PA          Follow-up Information       Follow up With Specialties Details Why Contact Info    Jimena Carpenter FNP Nurse Practitioner  Schedule an appointment as soon as possible for a visit in 3 days  [address] [facility]  Internal Medicine Clinic  [facility] [address]  [phone]      [facility] Emergency Dept Emergency Medicine In 3 days As needed, If symptoms worsen [address] [facility] [id number] [phone]             Lorne Sherwood PA  11/20/23 0206       Lorne Sherwood PA  11/20/23 0207

## 2023-11-20 NOTE — DISCHARGE INSTRUCTIONS
Take all medications as prescribed.     Drink plenty of fluids and get a lot of rest.     Have your ears flushed with your PCP or Urgent Care.     Return to ER for any changes or worsening of symptoms.

## 2023-11-21 ENCOUNTER — PROCEDURE VISIT (OUTPATIENT)
Dept: SLEEP MEDICINE | Facility: HOSPITAL | Age: 43
End: 2023-11-21
Attending: NURSE PRACTITIONER
Payer: MEDICAID

## 2023-11-21 DIAGNOSIS — G47.33 OSA ON CPAP: ICD-10-CM

## 2023-11-21 PROCEDURE — 95811 POLYSOM 6/>YRS CPAP 4/> PARM: CPT

## 2024-02-02 NOTE — PROGRESS NOTES
"CHIEF COMPLAINT:   No chief complaint on file.                                                 HPI:  Fredi Ricci 43 y.o. male who has a past medical history of Anemia, Gastric ulcer, and Hypertension who presents to Cardiology Clinic today for follow up and ongoing care..  He was initially referred to cardiology clinic after being seen in the emergency department 03/01/2023 for chest pain.  Patient completed exercise stress EKG which revealed no evidence of ischemia or infarction (7.6.23).  See full report below. At last office visit patient complained of SOB/CRANE and ongoing ASHER symptoms. Echo was ordered, not completed. Patient did complete sleep study which diagnosed patient with ASHER. He was given CPAP. Uses intermittently as he is "trying to get used to it". Encouraged compliance.     Today patient states that he is feeling stable over all. He states that his SOB/CRANE has not worsened but is still present with more moderate to high levels of exertion. He also notices SOB "when I get excited". He reports occasional dizziness, specifically when changing positions or laying down. Otherwise he denies any complaints of chest pain, palpitations, orthopnea, peripheral edema, or claudication symptoms, or syncope. He is able to complete ADLs without any issue or ischemic symptoms. He reports compliance with all medications. He does not follow a particular diet, encouraged low sodium diet today. He denies any tobacco or other illicit drug use.       Denies smoking. Does drink alcohol, 1-2 beers a day. Denies use of any illicit substances.                                                                                                                                                                                                                                                                                                                                                                                                      "                                                                           CARDIAC TESTING:  No results found for this or any previous visit.    Results for orders placed during the hospital encounter of 07/06/23    Exercise Stress - EKG    Interpretation Summary    The ECG portion of the study is negative for ischemia.    The patient reported no chest pain during the stress test.    The patient exercised for 12 minutes 24 seconds on a Gen protocol, corresponding to a functional capacity of 15 METS, achieving a peak heart rate of 162 bpm, which is 91 % of the age predicted maximum heart rate.     No results found for this or any previous visit.       Patient Active Problem List   Diagnosis    Normocytic anemia    Gastric ulcer due to nonsteroidal antiinflammatory drug (NSAID) therapy    Chronic constipation    Primary hypertension    Headache    IFG (impaired fasting glucose)    Overweight with body mass index (BMI) of 29 to 29.9 in adult    Heel pain    Anxiety and depression    Other chest pain    Shortness of breath    Excessive daytime sleepiness    Loud snoring    PND (paroxysmal nocturnal dyspnea)     Past Surgical History:   Procedure Laterality Date    COLONOSCOPY  2022    UPPER GASTROINTESTINAL ENDOSCOPY       Social History     Socioeconomic History    Marital status: Single   Tobacco Use    Smoking status: Never     Passive exposure: Never    Smokeless tobacco: Never   Substance and Sexual Activity    Alcohol use: Yes     Alcohol/week: 14.0 standard drinks of alcohol     Types: 14 Cans of beer per week     Comment: 2 daily    Drug use: Not Currently     Social Determinants of Health     Transportation Needs: No Transportation Needs (1/27/2023)    PRAPARE - Transportation     Lack of Transportation (Medical): No     Lack of Transportation (Non-Medical): No   Housing Stability: Low Risk  (1/27/2023)    Housing Stability Vital Sign     Unable to Pay for Housing in the Last Year: No     Number of Places  Lived in the Last Year: 1     Unstable Housing in the Last Year: No        Family History   Problem Relation Age of Onset    Diabetes Father     Heart disease Maternal Grandfather      Review of patient's allergies indicates:  No Known Allergies      ROS:  Review of Systems   Constitutional:  Negative for malaise/fatigue.   HENT: Negative.     Eyes: Negative.    Respiratory:  Positive for shortness of breath.    Cardiovascular:  Positive for PND. Negative for chest pain, palpitations, orthopnea, claudication and leg swelling.   Gastrointestinal: Negative.    Genitourinary: Negative.    Musculoskeletal: Negative.    Skin: Negative.    Neurological:  Positive for headaches. Negative for dizziness (Occasional) and weakness.   Endo/Heme/Allergies: Negative.    Psychiatric/Behavioral: Negative.                                                                                                                                                                                  Negative except as stated in the history of present illness. See HPI for details.    PHYSICAL EXAM:  There were no vitals taken for this visit.      Physical Exam  Constitutional:       Appearance: Normal appearance.   HENT:      Head: Normocephalic.      Mouth/Throat:      Mouth: Mucous membranes are moist.   Eyes:      Extraocular Movements: Extraocular movements intact.   Neck:      Vascular: No carotid bruit.   Cardiovascular:      Rate and Rhythm: Normal rate and regular rhythm.      Pulses: Normal pulses.      Heart sounds: Normal heart sounds.   Pulmonary:      Effort: Pulmonary effort is normal. No respiratory distress.      Breath sounds: Normal breath sounds.   Musculoskeletal:         General: Normal range of motion.      Right lower leg: No edema.      Left lower leg: No edema.   Skin:     General: Skin is warm and dry.   Neurological:      General: No focal deficit present.      Mental Status: He is alert and oriented to person, place, and  "time.   Psychiatric:         Mood and Affect: Mood normal.         Behavior: Behavior normal.       Current Outpatient Medications   Medication Instructions    amoxicillin-clavulanate 875-125mg (AUGMENTIN) 875-125 mg per tablet 1 tablet, Oral, 2 times daily    butalbital-acetaminophen-caffeine -40 mg (FIORICET, ESGIC) -40 mg per tablet 1 tablet, Oral, Every 6 hours PRN    ciprofloxacin HCl (CILOXAN) 0.3 % ophthalmic solution 5 drops to right ear every 12 hours X 10 days    dicyclomine (BENTYL) 20 mg, Oral, Every 6 hours    ferrous sulfate 325 mg, Oral, Daily    hydrOXYzine pamoate (VISTARIL) 50 mg, Oral, Every 6 hours PRN    ibuprofen (ADVIL,MOTRIN) 800 mg, Oral, Every 8 hours PRN    losartan (COZAAR) 25 mg, Oral, Daily    methylPREDNISolone (MEDROL DOSEPACK) 4 mg tablet Take as package instructs    NIFEdipine (PROCARDIA-XL) 90 mg, Oral, Daily    ondansetron (ZOFRAN-ODT) 4 mg, Oral, Every 8 hours PRN    pantoprazole (PROTONIX) 40 mg, Oral, Daily    propranoloL (INDERAL) 40 mg, Oral, 2 times daily    sertraline (ZOLOFT) 25 mg, Oral, Nightly    sumatriptan (IMITREX) 25 mg, Oral, Daily PRN, May repeat in 2 hrs x 1 dose if initial dose ineffective    topiramate (TOPAMAX) 25 mg, Oral, 2 times daily, Take at bedtime for the first 7 days and then increase to BID.        All medications, laboratory studies, cardiac diagnostic imaging reviewed.     Lab Results   Component Value Date    .00 01/27/2023     (H) 03/25/2019    TRIG 130 01/27/2023    TRIG 160 (H) 12/21/2022    CREATININE 0.85 11/16/2023    K 4.1 11/16/2023        ASSESSMENT/PLAN:  Atypical Chest Pain-Resolved  - Resolved - denies any recent episodes  - Has been asymptomatic for the past several clinic visits  - TST negative for any ischemia or infarction (See full report above, 7.6.23)    Dyspnea on Exertion/Shortness of Breath  - Stable from last visit- typically occurs with mild-moderate exertional activities   - Also occurs with "over " "excitement"  - Rarely interferes with ADLs/work  - Is a limiting factor in physical activity   - Will order Echo for further evaluation of valvular/structural heart disease  - TST negative for ischemia or infarction     Hypertension  - BP above goal today  - Reports compliance with all medications  - Continue current medication regimen of Procardia 90 mg, Losartan 25, Propranolol 40  - Patient to return to clinic in 2-3 weeks for NV for BP/symptom check. Will increase Losartan at that time if BP still elevated  - Encouraged compliance with CPAP as this will likely improve BP also    - Counseled on importance of following a low sodium, heart healthy diet, and exercise as tolerated     Headaches  - Patient on Topamax, Fioricet, and sumatriptan.  - States that above medications are not working well for him  - States that PCP will refer to Neuro     ASHER  - Was diagnosed with ASHER following sleep study  - Prescribed CPAP- reports intermittent compliance  - Encouraged compliance and educated on detrimental effects of untreated sleep apnea       Complete Echo  Nurse visit in 2-3 weeks for BP/symptom check  Follow up in Cardiology Clinic in 4 months or sooner if needed  Follow up with PCP as directed    "

## 2024-02-09 ENCOUNTER — OFFICE VISIT (OUTPATIENT)
Dept: OTOLARYNGOLOGY | Facility: CLINIC | Age: 44
End: 2024-02-09
Payer: MEDICAID

## 2024-02-09 VITALS
SYSTOLIC BLOOD PRESSURE: 150 MMHG | HEIGHT: 69 IN | HEART RATE: 78 BPM | DIASTOLIC BLOOD PRESSURE: 100 MMHG | BODY MASS INDEX: 28.35 KG/M2 | TEMPERATURE: 98 F | WEIGHT: 191.38 LBS

## 2024-02-09 DIAGNOSIS — T16.1XXA FOREIGN BODY OF RIGHT EAR, INITIAL ENCOUNTER: ICD-10-CM

## 2024-02-09 DIAGNOSIS — H61.21 IMPACTED CERUMEN OF RIGHT EAR: ICD-10-CM

## 2024-02-09 PROCEDURE — 69210 REMOVE IMPACTED EAR WAX UNI: CPT | Mod: S$PBB,,, | Performed by: NURSE PRACTITIONER

## 2024-02-09 PROCEDURE — 99214 OFFICE O/P EST MOD 30 MIN: CPT | Mod: PBBFAC | Performed by: NURSE PRACTITIONER

## 2024-02-09 PROCEDURE — 69210 REMOVE IMPACTED EAR WAX UNI: CPT | Mod: PBBFAC,RT | Performed by: NURSE PRACTITIONER

## 2024-02-09 PROCEDURE — 3008F BODY MASS INDEX DOCD: CPT | Mod: CPTII,,, | Performed by: NURSE PRACTITIONER

## 2024-02-09 PROCEDURE — 1159F MED LIST DOCD IN RCRD: CPT | Mod: CPTII,,, | Performed by: NURSE PRACTITIONER

## 2024-02-09 PROCEDURE — 3080F DIAST BP >= 90 MM HG: CPT | Mod: CPTII,,, | Performed by: NURSE PRACTITIONER

## 2024-02-09 PROCEDURE — 3077F SYST BP >= 140 MM HG: CPT | Mod: CPTII,,, | Performed by: NURSE PRACTITIONER

## 2024-02-09 PROCEDURE — 99213 OFFICE O/P EST LOW 20 MIN: CPT | Mod: 25,S$PBB,, | Performed by: NURSE PRACTITIONER

## 2024-02-09 NOTE — PROGRESS NOTES
[facility]  Otolaryngology Clinic Note    Fredi Ricci  YOB: 1980    Chief Complaint:   Chief Complaint   Patient presents with    referral: Otitis Media        HPI: 02/09/2024: 43 y.o. male presents with c/o right ear pain and decreased hearing. States he was told he had wax and that his ear was red. Reports it feels very irritated and he sees blood when he tries to clean it with a qtip. He has been on prescription and OTC ear drops without benefit. Infrequent b/l tinnitus. Denies any previous otologic hx.     ROS:   10-point review of systems negative except per HPI      Review of patient's allergies indicates:  No Known Allergies    Past Medical History:   Diagnosis Date    Anemia     Gastric ulcer     Hypertension        Past Surgical History:   Procedure Laterality Date    COLONOSCOPY  2022    UPPER GASTROINTESTINAL ENDOSCOPY         Social History     Socioeconomic History    Marital status: Single   Tobacco Use    Smoking status: Never     Passive exposure: Never    Smokeless tobacco: Never   Substance and Sexual Activity    Alcohol use: Yes     Alcohol/week: 14.0 standard drinks of alcohol     Types: 14 Cans of beer per week     Comment: 2 daily    Drug use: Not Currently     Social Determinants of Health     Transportation Needs: No Transportation Needs (1/27/2023)    PRAPARE - Transportation     Lack of Transportation (Medical): No     Lack of Transportation (Non-Medical): No   Housing Stability: Low Risk  (1/27/2023)    Housing Stability Vital Sign     Unable to Pay for Housing in the Last Year: No     Number of Places Lived in the Last Year: 1     Unstable Housing in the Last Year: No       Family History   Problem Relation Age of Onset    Diabetes Father     Heart disease Maternal Grandfather        Outpatient Encounter Medications as of 2/9/2024   Medication Sig Dispense Refill    butalbital-acetaminophen-caffeine -40 mg (FIORICET, ESGIC) -40 mg per  tablet Take 1 tablet by mouth every 6 (six) hours as needed for Headaches. 45 tablet 1    dicyclomine (BENTYL) 20 mg tablet Take 20 mg by mouth every 6 (six) hours.      ferrous sulfate 325 (65 FE) MG EC tablet Take 1 tablet (325 mg total) by mouth once daily. 90 tablet 1    hydrOXYzine pamoate (VISTARIL) 50 MG Cap Take 50 mg by mouth every 6 (six) hours as needed for Anxiety.      ibuprofen (ADVIL,MOTRIN) 800 MG tablet Take 1 tablet (800 mg total) by mouth every 8 (eight) hours as needed for Pain. 20 tablet 0    losartan (COZAAR) 25 MG tablet Take 1 tablet (25 mg total) by mouth once daily. 90 tablet 1    NIFEdipine (PROCARDIA-XL) 90 MG (OSM) 24 hr tablet Take 1 tablet (90 mg total) by mouth once daily. 90 tablet 1    propranoloL (INDERAL) 40 MG tablet Take 1 tablet (40 mg total) by mouth 2 (two) times daily. 180 tablet 1    sumatriptan (IMITREX) 25 MG Tab Take 1 tablet (25 mg total) by mouth daily as needed (migraines). May repeat in 2 hrs x 1 dose if initial dose ineffective 9 tablet 1    topiramate (TOPAMAX) 25 MG tablet Take 1 tablet (25 mg total) by mouth 2 (two) times daily. Take at bedtime for the first 7 days and then increase to BID. 60 tablet 1    amoxicillin-clavulanate 875-125mg (AUGMENTIN) 875-125 mg per tablet Take 1 tablet by mouth 2 (two) times daily. (Patient not taking: Reported on 2/9/2024) 14 tablet 0    ciprofloxacin HCl (CILOXAN) 0.3 % ophthalmic solution 5 drops to right ear every 12 hours X 10 days (Patient not taking: Reported on 2/9/2024) 5 mL 0    methylPREDNISolone (MEDROL DOSEPACK) 4 mg tablet Take as package instructs (Patient not taking: Reported on 2/9/2024) 1 each 0    ondansetron (ZOFRAN-ODT) 4 MG TbDL Take 4 mg by mouth every 8 (eight) hours as needed.      pantoprazole (PROTONIX) 40 MG tablet Take 1 tablet (40 mg total) by mouth once daily. 30 tablet 1    sertraline (ZOLOFT) 25 MG tablet Take 1 tablet (25 mg total) by mouth nightly. (Patient not taking: Reported on 2/9/2024) 90  "tablet 1     No facility-administered encounter medications on file as of 2/9/2024.       Physical Exam:  Vitals:    02/09/24 0814 02/09/24 0815   BP: (!) 153/112 (!) 150/100   BP Location: Right arm Right arm   Patient Position: Sitting Sitting   BP Method: Large (Automatic) Large (Manual)   Pulse: 78    Temp: 97.9 °F (36.6 °C)    TempSrc: Oral    Weight: 86.8 kg (191 lb 6.4 oz)    Height: 5' 9" (1.753 m)        Physical Exam   General: NAD, voice normal  Neuro: AAO, CN II - XII grossly intact  Head/ Face: NCAT, symmetric, sensations intact bilaterally  Eyes: EOMI, PERRL  Ears: externally normal with grossly normal hearing  AD: EAC occluded- atraumatic removal of cerumen + apparent foreign body (?cotton tip of qtip), TM intact, no middle ear effusion, no retractions  AS: EAC patent, TM intact, no middle ear effusion, no retractions  Nose: bilateral nares patent, midline septum, no rhinorrhea, no external deformity, no turbinate hypertrophy  OC/OP: MMM, no intraoral lesions, no trismus, dentition is moderate, no uvular deviation, bilaterally symmetric soft palate elevation, palatoglossus and palatopharyngeal fold wnl; tonsils are symmetric and 1+  Indirect laryngoscopy: deferred due to patient intolerance  Neck: soft, supple, no LAD, normal ROM, no thyromegaly  Respiratory: nonlabored, no wheezing, bilateral chest rise  Cardiovascular: RRR  Gastrointestinal: S NT ND  Skin: warm, no lesions  Musculoskeletal: 5/5 strength  Psych: Appropriate affect/mood     Pertinent Data:  ? LABS:  ? AUDIO:           ? PATH:      Imaging:   I personally reviewed the following images:        Assessment/Plan:  43 y.o. male with right cerumen/foreign body removal.   - Baby oil to ear canals prn for ceruminolysis  - RTC prn     Kasey Berkowitz NP        "

## 2024-02-16 ENCOUNTER — OFFICE VISIT (OUTPATIENT)
Dept: CARDIOLOGY | Facility: CLINIC | Age: 44
End: 2024-02-16
Payer: MEDICAID

## 2024-02-16 VITALS
OXYGEN SATURATION: 98 % | TEMPERATURE: 98 F | BODY MASS INDEX: 29.16 KG/M2 | DIASTOLIC BLOOD PRESSURE: 103 MMHG | RESPIRATION RATE: 18 BRPM | HEART RATE: 76 BPM | SYSTOLIC BLOOD PRESSURE: 155 MMHG | WEIGHT: 196.88 LBS | HEIGHT: 69 IN

## 2024-02-16 DIAGNOSIS — R06.00 PND (PAROXYSMAL NOCTURNAL DYSPNEA): ICD-10-CM

## 2024-02-16 DIAGNOSIS — R06.02 SHORTNESS OF BREATH: Primary | ICD-10-CM

## 2024-02-16 DIAGNOSIS — I10 PRIMARY HYPERTENSION: Chronic | ICD-10-CM

## 2024-02-16 PROCEDURE — 1160F RVW MEDS BY RX/DR IN RCRD: CPT | Mod: CPTII,,,

## 2024-02-16 PROCEDURE — 3080F DIAST BP >= 90 MM HG: CPT | Mod: CPTII,,,

## 2024-02-16 PROCEDURE — 3008F BODY MASS INDEX DOCD: CPT | Mod: CPTII,,,

## 2024-02-16 PROCEDURE — 3077F SYST BP >= 140 MM HG: CPT | Mod: CPTII,,,

## 2024-02-16 PROCEDURE — 99214 OFFICE O/P EST MOD 30 MIN: CPT | Mod: S$PBB,,,

## 2024-02-16 PROCEDURE — 99215 OFFICE O/P EST HI 40 MIN: CPT | Mod: PBBFAC

## 2024-02-16 PROCEDURE — 1159F MED LIST DOCD IN RCRD: CPT | Mod: CPTII,,,

## 2024-02-16 RX ORDER — LOSARTAN POTASSIUM 25 MG/1
25 TABLET ORAL DAILY
Qty: 90 TABLET | Refills: 1 | Status: SHIPPED | OUTPATIENT
Start: 2024-02-16 | End: 2024-04-10 | Stop reason: DRUGHIGH

## 2024-02-16 RX ORDER — PROPRANOLOL HYDROCHLORIDE 40 MG/1
40 TABLET ORAL 2 TIMES DAILY
Qty: 180 TABLET | Refills: 1 | Status: SHIPPED | OUTPATIENT
Start: 2024-02-16

## 2024-02-16 RX ORDER — NIFEDIPINE 90 MG/1
90 TABLET, EXTENDED RELEASE ORAL DAILY
Qty: 90 TABLET | Refills: 1 | Status: SHIPPED | OUTPATIENT
Start: 2024-02-16

## 2024-02-16 NOTE — PATIENT INSTRUCTIONS
Complete Echo  Nurse visit in 2-3 weeks for BP/symptom check  Follow up in Cardiology Clinic in 4 months or sooner if needed  Follow up with PCP as directed

## 2024-04-09 RX ORDER — PANTOPRAZOLE SODIUM 40 MG/1
40 TABLET, DELAYED RELEASE ORAL DAILY
Qty: 30 TABLET | Refills: 3 | Status: SHIPPED | OUTPATIENT
Start: 2024-04-09

## 2024-04-09 RX ORDER — TOPIRAMATE 25 MG/1
25 TABLET ORAL 2 TIMES DAILY
Qty: 60 TABLET | Refills: 3 | Status: SHIPPED | OUTPATIENT
Start: 2024-04-09 | End: 2024-04-10 | Stop reason: SDUPTHER

## 2024-04-09 RX ORDER — FERROUS SULFATE 325(65) MG
325 TABLET, DELAYED RELEASE (ENTERIC COATED) ORAL DAILY
Qty: 30 TABLET | Refills: 3 | Status: SHIPPED | OUTPATIENT
Start: 2024-04-09

## 2024-04-09 NOTE — TELEPHONE ENCOUNTER
----- Message from Orlando Wilson sent at 4/9/2024  9:59 AM CDT -----  Type:  RX Refill Request    Who Called: pt  Refill or New Rx:refill    RX Name and Strength:pantoprazole (PROTONIX) 40 MG tablet  RX Name and Strength:ferrous sulfate 325 (65 FE) MG EC tablet  RX Name and Strength:topiramate (TOPAMAX) 25 MG tablet    Preferred Pharmacy with phone number:[facility] [address] [place] [address] Call Back Number:[phone]

## 2024-04-09 NOTE — TELEPHONE ENCOUNTER
Pt requesting refills for topiramate (TOPAMAX) 25 MG tablet , ferrous sulfate 325 (65 FE) MG EC tablet , pantoprazole (PROTONIX) 40 MG tablet     LOV:5/26/23    NOV:5/7/24

## 2024-04-10 ENCOUNTER — OFFICE VISIT (OUTPATIENT)
Dept: INTERNAL MEDICINE | Facility: CLINIC | Age: 44
End: 2024-04-10
Payer: MEDICAID

## 2024-04-10 VITALS
BODY MASS INDEX: 28.73 KG/M2 | OXYGEN SATURATION: 98 % | DIASTOLIC BLOOD PRESSURE: 97 MMHG | WEIGHT: 194 LBS | SYSTOLIC BLOOD PRESSURE: 146 MMHG | TEMPERATURE: 98 F | HEIGHT: 69 IN | HEART RATE: 67 BPM

## 2024-04-10 DIAGNOSIS — E66.3 OVERWEIGHT WITH BODY MASS INDEX (BMI) OF 28 TO 28.9 IN ADULT: ICD-10-CM

## 2024-04-10 DIAGNOSIS — G47.33 OSA ON CPAP: Chronic | ICD-10-CM

## 2024-04-10 DIAGNOSIS — F32.A ANXIETY AND DEPRESSION: Chronic | ICD-10-CM

## 2024-04-10 DIAGNOSIS — F41.9 ANXIETY AND DEPRESSION: Chronic | ICD-10-CM

## 2024-04-10 DIAGNOSIS — R73.01 IFG (IMPAIRED FASTING GLUCOSE): Chronic | ICD-10-CM

## 2024-04-10 DIAGNOSIS — G43.E09 CHRONIC MIGRAINE WITH AURA WITHOUT STATUS MIGRAINOSUS, NOT INTRACTABLE: Chronic | ICD-10-CM

## 2024-04-10 DIAGNOSIS — Z12.5 SCREENING FOR MALIGNANT NEOPLASM OF PROSTATE: ICD-10-CM

## 2024-04-10 DIAGNOSIS — I10 PRIMARY HYPERTENSION: Primary | Chronic | ICD-10-CM

## 2024-04-10 DIAGNOSIS — G44.229 CHRONIC TENSION-TYPE HEADACHE, NOT INTRACTABLE: Chronic | ICD-10-CM

## 2024-04-10 PROCEDURE — 3077F SYST BP >= 140 MM HG: CPT | Mod: CPTII,,, | Performed by: NURSE PRACTITIONER

## 2024-04-10 PROCEDURE — 99214 OFFICE O/P EST MOD 30 MIN: CPT | Mod: S$PBB,,, | Performed by: NURSE PRACTITIONER

## 2024-04-10 PROCEDURE — 3008F BODY MASS INDEX DOCD: CPT | Mod: CPTII,,, | Performed by: NURSE PRACTITIONER

## 2024-04-10 PROCEDURE — 1159F MED LIST DOCD IN RCRD: CPT | Mod: CPTII,,, | Performed by: NURSE PRACTITIONER

## 2024-04-10 PROCEDURE — 99215 OFFICE O/P EST HI 40 MIN: CPT | Mod: PBBFAC | Performed by: NURSE PRACTITIONER

## 2024-04-10 PROCEDURE — 4010F ACE/ARB THERAPY RXD/TAKEN: CPT | Mod: CPTII,,, | Performed by: NURSE PRACTITIONER

## 2024-04-10 PROCEDURE — 3080F DIAST BP >= 90 MM HG: CPT | Mod: CPTII,,, | Performed by: NURSE PRACTITIONER

## 2024-04-10 PROCEDURE — 1160F RVW MEDS BY RX/DR IN RCRD: CPT | Mod: CPTII,,, | Performed by: NURSE PRACTITIONER

## 2024-04-10 RX ORDER — SERTRALINE HYDROCHLORIDE 25 MG/1
25 TABLET, FILM COATED ORAL NIGHTLY
Qty: 30 TABLET | Refills: 1 | Status: SHIPPED | OUTPATIENT
Start: 2024-04-10

## 2024-04-10 RX ORDER — TOPIRAMATE 25 MG/1
25 TABLET ORAL 2 TIMES DAILY
Qty: 60 TABLET | Refills: 3 | Status: SHIPPED | OUTPATIENT
Start: 2024-04-10

## 2024-04-10 RX ORDER — SUMATRIPTAN SUCCINATE 25 MG/1
25 TABLET ORAL DAILY PRN
Qty: 9 TABLET | Refills: 1 | Status: SHIPPED | OUTPATIENT
Start: 2024-04-10

## 2024-04-10 RX ORDER — LOSARTAN POTASSIUM 50 MG/1
50 TABLET ORAL DAILY
Qty: 90 TABLET | Refills: 1 | Status: SHIPPED | OUTPATIENT
Start: 2024-04-10

## 2024-04-10 NOTE — PATIENT INSTRUCTIONS
Increase losartan to 50 mg every morning.  Continue procardia 90 mg at bedtime.  Resume topamax twice a day.  Resume zoloft at bedtime.   WEAR CPAP nightly.   Complete fasting labs prior to appt in May.

## 2024-04-10 NOTE — PROGRESS NOTES
Jimena Carpenter, ROSELINE   [facility]  [address] [facility] [address]      PATIENT NAME: Fredi Ricci  : 1980  DATE: 4/10/24  MRN: [id number]      Reason for Visit / Chief Complaint: Wellness (Pt states that everything been going ok )       History of Present Illness / Problem Focused Workflow     Fredi Ricci is a 44 y.o. Black or  male presents to the clinic for HTN f/u. PMH HTN, ASHER on CPAP, anxiety and depression, migraines, constipation, MALLORIE, GERD and gastric ulcer (chronic NSAID use ). Followed by [facility] cardio, GI and ENT clinics.     Today, pt presents after not being seen since May 2023. Was seen in cardio clinic for SOB and stress test unremarkable. Echo was ordered; pt did not complete yet. BP elevated again today. Reports migraines every couple of days that only improve with laying in dark room and taking BP powder with coke. Has not taken topamax d/t out of medication. Procardia was increased at last cardio visit in Feb. Did not attempt any alleviating measures. Reports intermittent use of CPAP. Continues to sleep in recliner during the day. States has different masks at home. Does not follow low sodium diet as instructed. Has been stressed out a lot and has anxiety and depression. Was previously on zoloft and was effective; pt stopped taking. Denies chest pain, shortness of breath, cough, fever, dizziness, weakness, abdominal pain, nausea, vomiting, diarrhea, constipation, dysuria, SI/HI.      Review of Systems     Review of Systems     See HPI for details    Constitutional: Denies Change in appetite. Denies Chills. Denies Fever. Denies Night sweats.  Eye: Denies Blurred vision. Denies Discharge. Denies Eye pain.  ENT: Denies Decreased hearing. Denies Sore throat. Denies Swollen glands.  Respiratory: Denies Cough. Denies Shortness of breath. Denies Shortness of breath with exertion. Denies  Wheezing.  Cardiovascular: Denies Chest pain at rest. Denies Chest pain with exertion. Denies Irregular heartbeat. Denies Palpitations. Denies Edema.  Gastrointestinal: Denies Abdominal pain. Denies Diarrhea. Denies Nausea. Denies Vomiting. Denies Hematemesis or Hematochezia.  Genitourinary: Denies Dysuria. Denies Urinary frequency. Denies Urinary urgency. Denies Blood in urine.  Endocrine: Denies Cold intolerance. Denies Excessive thirst. Denies Heat intolerance. Denies Weight loss. Denies Weight gain.  Musculoskeletal: Denies Painful joints. Denies Weakness.  Integumentary: Denies Rash. Denies Itching. Denies Dry skin.  Neurologic: Denies Dizziness. Denies Fainting. Admits Headache.  Psychiatric: Admits Depression. Admits Anxiety. Denies Suicidal/Homicidal ideations.    All Other ROS: Negative except as stated in HPI.     Medical / Surgical / Social / Family History       ----------------------------  Anemia  Gastric ulcer  Hypertension     Past Surgical History:   Procedure Laterality Date    COLONOSCOPY  2022    UPPER GASTROINTESTINAL ENDOSCOPY         Social History     Socioeconomic History    Marital status: Single   Tobacco Use    Smoking status: Never     Passive exposure: Never    Smokeless tobacco: Never   Substance and Sexual Activity    Alcohol use: Yes     Alcohol/week: 14.0 standard drinks of alcohol     Types: 14 Cans of beer per week     Comment: 2 daily    Drug use: Not Currently     Social Determinants of Health     Transportation Needs: No Transportation Needs (1/27/2023)    PRAPARE - Transportation     Lack of Transportation (Medical): No     Lack of Transportation (Non-Medical): No   Housing Stability: Low Risk  (1/27/2023)    Housing Stability Vital Sign     Unable to Pay for Housing in the Last Year: No     Number of Places Lived in the Last Year: 1     Unstable Housing in the Last Year: No        Family History   Problem Relation Age of Onset    Diabetes Father     Heart disease Maternal  "Grandfather         Medications and Allergies     Medications  Current Outpatient Medications   Medication Instructions    butalbital-acetaminophen-caffeine -40 mg (FIORICET, ESGIC) -40 mg per tablet 1 tablet, Oral, Every 6 hours PRN    ferrous sulfate 325 mg, Oral, Daily    hydrOXYzine pamoate (VISTARIL) 50 mg, Oral, Every 6 hours PRN    ibuprofen (ADVIL,MOTRIN) 800 mg, Oral, Every 8 hours PRN    losartan (COZAAR) 50 mg, Oral, Daily    NIFEdipine (PROCARDIA-XL) 90 mg, Oral, Daily    ondansetron (ZOFRAN-ODT) 4 mg, Oral, Every 8 hours PRN    pantoprazole (PROTONIX) 40 mg, Oral, Daily    propranoloL (INDERAL) 40 mg, Oral, 2 times daily    sertraline (ZOLOFT) 25 mg, Oral, Nightly    sumatriptan (IMITREX) 25 mg, Oral, Daily PRN, May repeat in 2 hrs x 1 dose if initial dose ineffective    topiramate (TOPAMAX) 25 mg, Oral, 2 times daily         Allergies  Review of patient's allergies indicates:  No Known Allergies    Physical Examination     BP (!) 146/97 (BP Location: Right arm, Patient Position: Sitting, BP Method: Large (Manual))   Pulse 67   Temp 98.3 °F (36.8 °C) (Oral)   Ht 5' 9" (1.753 m)   Wt 88 kg (194 lb)   SpO2 98%   BMI 28.65 kg/m²     Physical Exam    General: Alert and oriented, No acute distress.  Head: Normocephalic, Atraumatic.  Eye: Pupils are equal, round and reactive to light, Extraocular movements are intact, Sclera non-icteric.  Ears/Nose/Throat: Normal, Mucosa moist,Clear.  Neck/Thyroid: Supple, Non-tender, No carotid bruit, No lymphadenopathy, No JVD, Full range of motion.  Respiratory: Clear to auscultation bilaterally; No wheezes, rales or rhonchi,Non-labored respirations, Symmetrical chest wall expansion.  Cardiovascular: Regular rate and rhythm, S1/S2 normal, No murmurs, rubs or gallops.  Gastrointestinal: Soft, Non-tender, Non-distended, Normal bowel sounds, No palpable organomegaly.  Musculoskeletal: Normal range of motion.  Integumentary: Warm, Dry, Intact, No suspicious " lesions or rashes.  Extremities: No clubbing, cyanosis or edema  Neurologic: No focal deficits, Cranial Nerves II-XII are grossly intact, Motor strength normal upper and lower extremities, Sensory exam intact.  Psychiatric: Normal interaction, Coherent speech, Appropriate affect       Results     Lab Results   Component Value Date    WBC 7.33 11/16/2023    HGB 15.1 11/16/2023    HCT 46.6 11/16/2023     11/16/2023    CHOL 207 (H) 01/27/2023    TRIG 130 01/27/2023    ALT 57 (H) 06/26/2023    AST 26 06/26/2023     11/16/2023    K 4.1 11/16/2023     06/19/2022    CREATININE 0.85 11/16/2023    BUN 14.1 11/16/2023    CO2 28 11/16/2023    TSH 0.634 01/27/2023    PSA 0.80 01/27/2023    INR 1.08 06/24/2021    HGBA1C 5.1 01/27/2023         Assessment and Plan (including Health Maintenance)     Plan:     1. Primary hypertension  Elevated.  Increase losartan to 50 mg daily.  Continue procardia at bedtime and propanolol.  Follow a low sodium (less than 2 grams of sodium per day), DASH diet.   Continue medications as prescribed.  Monitor blood pressure and report any consistent values greater than 140/90 and keep a log.  Maintain healthy weight with a BMI goal of <30.   Aerobic exercise for 150 minutes per week (or 5 days a week for 30 minutes each day).    - CBC Auto Differential; Future  - Comprehensive Metabolic Panel; Future  - Microalbumin/Creatinine Ratio, Urine; Future  - TSH; Future  - Urinalysis; Future  - losartan (COZAAR) 50 MG tablet; Take 1 tablet (50 mg total) by mouth once daily.  Dispense: 90 tablet; Refill: 1    2. Anxiety and depression  Resume zoloft at bedtime.  Denies SI/HI.  Read positive daily meditations, avoid negative media, set healthy boundaries.  Exercise daily, keep consistent sleep pattern, eat a healthy diet.  Establish good social support, make changes to reduce stress.  Do not drink alcohol or use illicit drugs.  Reports any symptoms of suicidal/homicidal ideations or self harm  immediately, go to nearest emergency room.    - sertraline (ZOLOFT) 25 MG tablet; Take 1 tablet (25 mg total) by mouth nightly.  Dispense: 30 tablet; Refill: 1    3. ASHER on CPAP  Discussed long term effects of noncompliance with CPAP.  Instructed pt to attempt different masks for several days consistently at a time.  Expect lifetime use and decreased daytime sleepiness/fatigue.   Avoid excessive alcohol, smoking and overuse of sedatives at bedtime.  Weight management discussed.  Adjust sleep position as needed for increased comfort.      4. Chronic migraine with aura without status migrainosus, not intractable  CT head ordered.  Resume topamax BID.  Refilled imitrex prn.  Keep neuro appts as scheduled.  Avoid triggers such as bright lights, alcohol, nicotine, aged cheeses, chocolate, caffeine, foods/drinks that contain nitrates or aspartame.  Take meds as prescribed.  Lie in a quiet, dark room if possible.  Limit stress and get at least 8 hours of sleep per night.    - CT Head Without Contrast; Future  - topiramate (TOPAMAX) 25 MG tablet; Take 1 tablet (25 mg total) by mouth 2 (two) times daily.  Dispense: 60 tablet; Refill: 3    5. Overweight with body mass index (BMI) of 28 to 28.9 in adult  BMI 28. Goal BMI <25.  Aerobic exercise 150 minutes per week.  Avoid soda, simple sugars, sweets, excessive rice, pasta, potatoes or bread.   Choose brown options when available and portion control.  Limit fast foods and fried foods.   Choose complex carbs in moderation (ex: green, leafy vegetables, beans, oatmeal).  Eat plenty of fresh fruits and vegetables with lean meats daily.   Consider permanent healthy lifestyle changes.    - Lipid Panel; Future        Problem List Items Addressed This Visit          Neuro    Headache (Chronic)    Relevant Medications    topiramate (TOPAMAX) 25 MG tablet    Chronic migraine with aura without status migrainosus, not intractable (Chronic)    Relevant Orders    CT Head Without Contrast        Psychiatric    Anxiety and depression (Chronic)    Relevant Medications    sertraline (ZOLOFT) 25 MG tablet       Cardiac/Vascular    Primary hypertension - Primary (Chronic)    Relevant Medications    losartan (COZAAR) 50 MG tablet    Other Relevant Orders    CBC Auto Differential    Comprehensive Metabolic Panel    Microalbumin/Creatinine Ratio, Urine    TSH    Urinalysis       Endocrine    IFG (impaired fasting glucose) (Chronic)    Relevant Orders    Hemoglobin A1C    Overweight with body mass index (BMI) of 28 to 28.9 in adult (Chronic)    Relevant Orders    Lipid Panel       Other    ASHER on CPAP (Chronic)     Other Visit Diagnoses       Screening for malignant neoplasm of prostate        Relevant Orders    PSA, Screening              Health Maintenance Due   Topic Date Due    COVID-19 Vaccine (1) Never done    HIV Screening  Never done    Influenza Vaccine (1) Never done    Hemoglobin A1c (Prediabetes)  01/27/2024       Follow up in about 4 weeks (around 5/8/2024) for BP Check, Lab Results.        Signature:  ROSELINE Quintanilla  [facility]  [address] [facility] [id number]

## 2024-05-24 ENCOUNTER — HOSPITAL ENCOUNTER (EMERGENCY)
Facility: HOSPITAL | Age: 44
Discharge: HOME | End: 2024-05-24
Attending: EMERGENCY MEDICINE
Payer: MEDICAID

## 2024-05-24 VITALS
TEMPERATURE: 98 F | BODY MASS INDEX: 28.14 KG/M2 | HEIGHT: 69 IN | OXYGEN SATURATION: 98 % | WEIGHT: 190 LBS | DIASTOLIC BLOOD PRESSURE: 99 MMHG | RESPIRATION RATE: 16 BRPM | HEART RATE: 76 BPM | SYSTOLIC BLOOD PRESSURE: 166 MMHG

## 2024-05-24 DIAGNOSIS — R42 VERTIGO: Primary | ICD-10-CM

## 2024-05-24 PROCEDURE — 63600175 PHARM REV CODE 636 W HCPCS

## 2024-05-24 PROCEDURE — 25000003 PHARM REV CODE 250

## 2024-05-24 PROCEDURE — 99284 EMERGENCY DEPT VISIT MOD MDM: CPT | Mod: 25

## 2024-05-24 PROCEDURE — 96374 THER/PROPH/DIAG INJ IV PUSH: CPT

## 2024-05-24 PROCEDURE — 96361 HYDRATE IV INFUSION ADD-ON: CPT

## 2024-05-24 RX ORDER — MECLIZINE HYDROCHLORIDE 25 MG/1
25 TABLET ORAL
Status: COMPLETED | OUTPATIENT
Start: 2024-05-24 | End: 2024-05-24

## 2024-05-24 RX ORDER — MECLIZINE HYDROCHLORIDE 25 MG/1
25 TABLET ORAL 3 TIMES DAILY PRN
Qty: 20 TABLET | Refills: 0 | Status: SHIPPED | OUTPATIENT
Start: 2024-05-24

## 2024-05-24 RX ORDER — KETOROLAC TROMETHAMINE 30 MG/ML
30 INJECTION, SOLUTION INTRAMUSCULAR; INTRAVENOUS
Status: COMPLETED | OUTPATIENT
Start: 2024-05-24 | End: 2024-05-24

## 2024-05-24 RX ORDER — KETOROLAC TROMETHAMINE 10 MG/1
10 TABLET, FILM COATED ORAL EVERY 6 HOURS
Qty: 20 TABLET | Refills: 0 | Status: SHIPPED | OUTPATIENT
Start: 2024-05-24 | End: 2024-05-29

## 2024-05-24 RX ADMIN — KETOROLAC TROMETHAMINE 30 MG: 30 INJECTION, SOLUTION INTRAMUSCULAR at 11:05

## 2024-05-24 RX ADMIN — SODIUM CHLORIDE 999 ML: 9 INJECTION, SOLUTION INTRAVENOUS at 11:05

## 2024-05-24 RX ADMIN — MECLIZINE HYDROCHLORIDE 25 MG: 25 TABLET ORAL at 11:05

## 2024-05-24 NOTE — ED PROVIDER NOTES
"Encounter Date: 5/24/2024       History     Chief Complaint   Patient presents with    Headache     Pt presents with headache and dizziness; pt reports yesterday AM (0800) he woke up with a headache, along with dizziness with bending over or lying down; headache continued this AM, along with dizziness; took a BC powder and "headache medicine" without relief; denies any CP/SOB; denies any weakness to extremities or numbness       Fredi, 44-year-old male presents to the emergency room with complaint of headache, dizziness and a feeling of off balance onset yesterday.  Patient reports symptoms worse when bending over or leaning.    Patient with a history of high blood pressure, GERD, migraines.   Patient reports unrelieved with medicines.    Patient denies any blurry vision.  Bilateral ear canals patent Tms intact.  Mild hypertensive.  Trigger-point to the frontal region. PERRLA.   Normal sclerae and conjunctiva.  No nystagmus diplopia or ptosis.  Nares patent without drainage or congestion.  Neck supple full range of motion no lymphadenopathy.  Moves all extremities with good strength distal motor sensory intact good pulses.  Patient is awake alert and oriented x4 GCS 15 cranial 2 through 12 intact focal neuro deficits negative.  Positive deep tendon reflexes normal.   Negative Romberg no pronator drift speech clear gait steady.        No known drug allergies   Past medical history includes hypertension gastric ulcer anemia   Past surgical history includes colonoscopy and EGD   Social history includes alcohol use and no smoking.      Medications include Topamax Imitrex Zoloft Inderal, protonix,  Procardia XL Cozaar, ibuprofen iron and Fioricet    The history is provided by the patient. No  was used.     Review of patient's allergies indicates:  No Known Allergies  Past Medical History:   Diagnosis Date    Anemia     Gastric ulcer     Hypertension      Past Surgical History:   Procedure Laterality " Date    COLONOSCOPY  2022    UPPER GASTROINTESTINAL ENDOSCOPY       Family History   Problem Relation Name Age of Onset    Diabetes Father      Heart disease Maternal Grandfather       Social History     Tobacco Use    Smoking status: Never     Passive exposure: Never    Smokeless tobacco: Never   Substance Use Topics    Alcohol use: Yes     Alcohol/week: 14.0 standard drinks of alcohol     Types: 14 Cans of beer per week     Comment: 2 daily    Drug use: Not Currently     Review of Systems   Constitutional: Negative.    HENT:  Positive for sinus pressure and sinus pain.    Eyes: Negative.    Respiratory: Negative.     Cardiovascular: Negative.    Gastrointestinal: Negative.    Endocrine: Negative.    Genitourinary: Negative.    Musculoskeletal: Negative.    Skin: Negative.    Allergic/Immunologic: Negative.    Neurological:  Positive for dizziness, light-headedness and headaches.   Hematological: Negative.    Psychiatric/Behavioral: Negative.     All other systems reviewed and are negative.      Physical Exam     Initial Vitals [05/24/24 1023]   BP Pulse Resp Temp SpO2   (!) 166/99 76 16 97.6 °F (36.4 °C) 98 %      MAP       --         Physical Exam    Nursing note and vitals reviewed.  Constitutional: He appears well-developed and well-nourished.   HENT:   Head: Normocephalic and atraumatic.   Right Ear: External ear normal.   Left Ear: External ear normal.   Nose: Nose normal.   Mouth/Throat: Oropharynx is clear and moist.   Eyes: Conjunctivae and EOM are normal. Pupils are equal, round, and reactive to light.   Neck: Neck supple.   Normal range of motion.  Cardiovascular:  Normal rate, regular rhythm, normal heart sounds and intact distal pulses.           Pulmonary/Chest: Breath sounds normal.   Abdominal: Abdomen is soft. Bowel sounds are normal.   Musculoskeletal:         General: Normal range of motion.      Cervical back: Normal range of motion and neck supple.     Neurological: He is alert and oriented to  person, place, and time. He has normal strength and normal reflexes. GCS score is 15. GCS eye subscore is 4. GCS verbal subscore is 5. GCS motor subscore is 6.   Skin: Skin is warm and dry. Capillary refill takes less than 2 seconds.   Psychiatric: He has a normal mood and affect. His behavior is normal. Judgment and thought content normal.         ED Course   Procedures  Labs Reviewed - No data to display       Imaging Results    None          Medications   sodium chloride 0.9% bolus 1,000 mL 1,000 mL (999 mLs Intravenous New Bag 5/24/24 1145)   meclizine tablet 25 mg (25 mg Oral Given 5/24/24 1137)   ketorolac injection 30 mg (30 mg Intravenous Given 5/24/24 1137)     Medical Decision Making   Medical decision-making     Differential diagnoses:   vertigo,  tension headache, sinusitis        Update:  Pt symptoms improving will discharge after IVF    Risk  Prescription drug management.                                      Clinical Impression:  Final diagnoses:  [R42] Vertigo (Primary)          ED Disposition Condition    Discharge Stable          ED Prescriptions       Medication Sig Dispense Start Date End Date Auth. Provider    ketorolac (TORADOL) 10 mg tablet Take 1 tablet (10 mg total) by mouth every 6 (six) hours. for 5 days 20 tablet 5/24/2024 5/29/2024 Crissy Tsai NP    meclizine (ANTIVERT) 25 mg tablet Take 1 tablet (25 mg total) by mouth 3 (three) times daily as needed for Dizziness. 20 tablet 5/24/2024 -- Crissy Tsai NP          Follow-up Information    None          Crissy Tsai NP  05/24/24 1226

## 2024-05-24 NOTE — DISCHARGE INSTRUCTIONS
Patient will be discharged home.  Take Toradol as prescribed.  Meclizine as needed for dizziness.  Encouraged to go from a lying to sitting position and count 10 seconds in between.  Follow up with primary care provider as needed.

## 2024-06-17 NOTE — PROGRESS NOTES
CHIEF COMPLAINT:   No chief complaint on file.                                                 HPI:  Fredi Ricci 44 y.o. male who has a past medical history of migraines, Anemia, Gastric ulcer, and Hypertension who presents to Cardiology Clinic today for follow up and ongoing care..  He was initially referred to cardiology clinic after being seen in the emergency department 03/01/2023 for chest pain.  Patient completed exercise stress EKG which revealed no evidence of ischemia or infarction (7.6.23).  See full report below.  At last office visit patient endorsed ongoing SOB/CRANE that he reported occurred when he gets excited or with moderate to high levels of exertion.  He was encouraged to complete the echo that has been ordered prior.  Per chart review he was not completed echo.    Today the patient states that he feels stable from a cardiac standpoint.  He continues to have SOB/CRANE with moderate to high levels of exertion, but states that it has remained the same.  He did not complete echo.  He reports occasional lightheadedness and dizziness, but states that he was recently diagnosed with vertigo of the ED. otherwise he denies any complain pain, palpitations, orthopnea, PND, peripheral edema, claudication symptoms, or syncope.  His main complaints today are centered around his chronic migraines.  He is able to complete his ADLs in his job duties without any issues or ischemic symptoms.  He reports compliance with all his current medications.  He states that he is trying to follow a heart healthy diet and do more exercise.  He denies any tobacco or other illicit drug use.  He reports intermittent he states that he adherence with CPAP as his untreated sleep apnea may be contributing to his difficult to control blood pressure.                                                                                                                                                                                                                                                                                                                                                                                                                                                                   CARDIAC TESTING:  No results found for this or any previous visit.    Results for orders placed during the hospital encounter of 07/06/23    Exercise Stress - EKG    Interpretation Summary    The ECG portion of the study is negative for ischemia.    The patient reported no chest pain during the stress test.    The patient exercised for 12 minutes 24 seconds on a Gen protocol, corresponding to a functional capacity of 15 METS, achieving a peak heart rate of 162 bpm, which is 91 % of the age predicted maximum heart rate.     No results found for this or any previous visit.       Patient Active Problem List   Diagnosis    Normocytic anemia    Gastric ulcer due to nonsteroidal antiinflammatory drug (NSAID) therapy    Chronic constipation    Primary hypertension    Headache    IFG (impaired fasting glucose)    Overweight with body mass index (BMI) of 28 to 28.9 in adult    Heel pain    Anxiety and depression    Other chest pain    Shortness of breath    Excessive daytime sleepiness    Loud snoring    PND (paroxysmal nocturnal dyspnea)    ASHER on CPAP    Chronic migraine with aura without status migrainosus, not intractable     Past Surgical History:   Procedure Laterality Date    COLONOSCOPY  2022    UPPER GASTROINTESTINAL ENDOSCOPY       Social History     Socioeconomic History    Marital status: Single   Tobacco Use    Smoking status: Never     Passive exposure: Never    Smokeless tobacco: Never   Substance and Sexual Activity    Alcohol use: Yes     Alcohol/week: 14.0 standard drinks of alcohol     Types: 14 Cans of beer per week     Comment: 2 daily    Drug use: Not Currently     Social Determinants of Health     Transportation Needs: No Transportation Needs  (1/27/2023)    PRAPARE - Transportation     Lack of Transportation (Medical): No     Lack of Transportation (Non-Medical): No   Housing Stability: Low Risk  (1/27/2023)    Housing Stability Vital Sign     Unable to Pay for Housing in the Last Year: No     Number of Places Lived in the Last Year: 1     Unstable Housing in the Last Year: No        Family History   Problem Relation Name Age of Onset    Diabetes Father      Heart disease Maternal Grandfather       Review of patient's allergies indicates:  No Known Allergies      ROS:  Review of Systems   Constitutional:  Negative for malaise/fatigue.   HENT: Negative.     Eyes: Negative.    Respiratory:  Positive for shortness of breath.    Cardiovascular:  Positive for PND. Negative for chest pain, palpitations, orthopnea, claudication and leg swelling.   Gastrointestinal: Negative.    Genitourinary: Negative.    Musculoskeletal: Negative.    Skin: Negative.    Neurological:  Positive for headaches. Negative for dizziness (Occasional) and weakness.   Endo/Heme/Allergies: Negative.    Psychiatric/Behavioral: Negative.                                                                                                                                                                                  Negative except as stated in the history of present illness. See HPI for details.    PHYSICAL EXAM:  There were no vitals taken for this visit.      Physical Exam  Constitutional:       Appearance: Normal appearance.   HENT:      Head: Normocephalic.      Mouth/Throat:      Mouth: Mucous membranes are moist.   Eyes:      Extraocular Movements: Extraocular movements intact.   Neck:      Vascular: No carotid bruit.   Cardiovascular:      Rate and Rhythm: Normal rate and regular rhythm.      Pulses: Normal pulses.      Heart sounds: Normal heart sounds.   Pulmonary:      Effort: Pulmonary effort is normal. No respiratory distress.      Breath sounds: Normal breath sounds.  "  Musculoskeletal:         General: Normal range of motion.      Right lower leg: No edema.      Left lower leg: No edema.   Skin:     General: Skin is warm and dry.   Neurological:      General: No focal deficit present.      Mental Status: He is alert and oriented to person, place, and time.   Psychiatric:         Mood and Affect: Mood normal.         Behavior: Behavior normal.       Current Outpatient Medications   Medication Instructions    butalbital-acetaminophen-caffeine -40 mg (FIORICET, ESGIC) -40 mg per tablet 1 tablet, Oral, Every 6 hours PRN    ferrous sulfate 325 mg, Oral, Daily    hydrOXYzine pamoate (VISTARIL) 50 mg, Oral, Every 6 hours PRN    ibuprofen (ADVIL,MOTRIN) 800 mg, Oral, Every 8 hours PRN    losartan (COZAAR) 50 mg, Oral, Daily    meclizine (ANTIVERT) 25 mg, Oral, 3 times daily PRN    NIFEdipine (PROCARDIA-XL) 90 mg, Oral, Daily    ondansetron (ZOFRAN-ODT) 4 mg, Oral, Every 8 hours PRN    pantoprazole (PROTONIX) 40 mg, Oral, Daily    propranoloL (INDERAL) 40 mg, Oral, 2 times daily    sertraline (ZOLOFT) 25 mg, Oral, Nightly    sumatriptan (IMITREX) 25 mg, Oral, Daily PRN, May repeat in 2 hrs x 1 dose if initial dose ineffective    topiramate (TOPAMAX) 25 mg, Oral, 2 times daily        All medications, laboratory studies, cardiac diagnostic imaging reviewed.     Lab Results   Component Value Date    .00 01/27/2023     (H) 03/25/2019    TRIG 130 01/27/2023    TRIG 160 (H) 12/21/2022    CREATININE 0.85 11/16/2023    K 4.1 11/16/2023        ASSESSMENT/PLAN:  Atypical Chest Pain-Resolved  - Resolved - denies any recent episodes  - Has been asymptomatic for the past several clinic visits  - TST negative for any ischemia or infarction (See full report above, 7.6.23)    Dyspnea on Exertion/Shortness of Breath  - Stable from last visit- typically occurs with mild-moderate exertional activities   - Also occurs with "over excitement"  - Rarely interferes with ADLs/work  - Is " a limiting factor in physical activity but is trying to walk more for exercise  - Will order Echo for further evaluation of valvular/structural heart disease- reminded patient to complete at todays visit   - TST negative for ischemia or infarction     Hypertension  - BP above goal today  - Reports compliance with all medications- states that he took them 30 minutes prior to appointment but is having significant migraine pain and also took BC powder and cocacola drink before coming  - Continue current medication regimen of Procardia 90 mg, Losartan 50, Propranolol 40  - Patient to return to clinic in 2-3 weeks for NV for BP/symptom check. Will increase Losartan at that time if BP still elevated  - Encouraged compliance with CPAP as this will likely improve BP also    - Counseled on importance of following a low sodium, heart healthy diet, and exercise as tolerated     Headaches  - Patient on Topamax, Fioricet, and sumatriptan.  - States that above medications are not working well for him  - States that PCP will refer to Neuro - strongly encouraged him to reach out to them for assistance with medications because it seems as though he is having consistent HA/migraines despite current regimen     ASHER  - Was diagnosed with ASHER following sleep study  - Prescribed CPAP- reports intermittent compliance  - Wears mostly during the day, encouraged more compliance at night as this is likely contributing to his elevated BP   - Encouraged compliance and educated on detrimental effects of untreated sleep apnea      Complete echocardiogram   In approximately 2-3 weeks, please either drop off home BP log for review or call clinic with home BP readings.  Medication adjustments will be made if needed at that time   Complete lab work ordered per PCP  Follow up in cardiology clinic in 4 months or sooner if needed   Follow up with PCP as directed   Please notify clinic if any new concerns or any change in symptoms

## 2024-06-18 ENCOUNTER — OFFICE VISIT (OUTPATIENT)
Dept: CARDIOLOGY | Facility: CLINIC | Age: 44
End: 2024-06-18
Payer: MEDICAID

## 2024-06-18 VITALS
RESPIRATION RATE: 18 BRPM | DIASTOLIC BLOOD PRESSURE: 104 MMHG | SYSTOLIC BLOOD PRESSURE: 160 MMHG | HEART RATE: 68 BPM | OXYGEN SATURATION: 96 % | TEMPERATURE: 98 F | HEIGHT: 69 IN | WEIGHT: 192.81 LBS | BODY MASS INDEX: 28.56 KG/M2

## 2024-06-18 DIAGNOSIS — G47.19 EXCESSIVE DAYTIME SLEEPINESS: ICD-10-CM

## 2024-06-18 DIAGNOSIS — R06.02 SHORTNESS OF BREATH: ICD-10-CM

## 2024-06-18 DIAGNOSIS — R07.89 OTHER CHEST PAIN: ICD-10-CM

## 2024-06-18 DIAGNOSIS — G47.33 OSA ON CPAP: Chronic | ICD-10-CM

## 2024-06-18 DIAGNOSIS — R06.83 LOUD SNORING: ICD-10-CM

## 2024-06-18 DIAGNOSIS — I10 PRIMARY HYPERTENSION: Primary | Chronic | ICD-10-CM

## 2024-06-18 DIAGNOSIS — R06.00 PND (PAROXYSMAL NOCTURNAL DYSPNEA): ICD-10-CM

## 2024-06-18 PROCEDURE — 1159F MED LIST DOCD IN RCRD: CPT | Mod: CPTII,,,

## 2024-06-18 PROCEDURE — 4010F ACE/ARB THERAPY RXD/TAKEN: CPT | Mod: CPTII,,,

## 2024-06-18 PROCEDURE — 3077F SYST BP >= 140 MM HG: CPT | Mod: CPTII,,,

## 2024-06-18 PROCEDURE — 1160F RVW MEDS BY RX/DR IN RCRD: CPT | Mod: CPTII,,,

## 2024-06-18 PROCEDURE — 99214 OFFICE O/P EST MOD 30 MIN: CPT | Mod: S$PBB,,,

## 2024-06-18 PROCEDURE — 3008F BODY MASS INDEX DOCD: CPT | Mod: CPTII,,,

## 2024-06-18 PROCEDURE — 99215 OFFICE O/P EST HI 40 MIN: CPT | Mod: PBBFAC

## 2024-06-18 PROCEDURE — 3080F DIAST BP >= 90 MM HG: CPT | Mod: CPTII,,,

## 2024-06-18 NOTE — PATIENT INSTRUCTIONS
Complete echocardiogram   In approximately 2-3 weeks, please either drop off home BP log for review or call clinic with home BP readings.  Medication adjustments will be made if needed at that time   Complete lab work ordered per PCP  Follow up in cardiology clinic in 4 months or sooner if needed   Follow up with PCP as directed   Please notify clinic if any new concerns or any change in symptoms

## 2024-08-09 ENCOUNTER — HOSPITAL ENCOUNTER (OUTPATIENT)
Dept: RADIOLOGY | Facility: HOSPITAL | Age: 44
Discharge: HOME | End: 2024-08-09
Attending: NURSE PRACTITIONER
Payer: MEDICAID

## 2024-08-09 ENCOUNTER — HOSPITAL ENCOUNTER (OUTPATIENT)
Dept: CARDIOLOGY | Facility: HOSPITAL | Age: 44
Discharge: HOME | End: 2024-08-09
Payer: MEDICAID

## 2024-08-09 VITALS
DIASTOLIC BLOOD PRESSURE: 122 MMHG | WEIGHT: 192 LBS | HEIGHT: 69 IN | BODY MASS INDEX: 28.44 KG/M2 | SYSTOLIC BLOOD PRESSURE: 180 MMHG

## 2024-08-09 DIAGNOSIS — G43.E09 CHRONIC MIGRAINE WITH AURA WITHOUT STATUS MIGRAINOSUS, NOT INTRACTABLE: Chronic | ICD-10-CM

## 2024-08-09 DIAGNOSIS — R06.02 SHORTNESS OF BREATH: ICD-10-CM

## 2024-08-09 DIAGNOSIS — I10 PRIMARY HYPERTENSION: Chronic | ICD-10-CM

## 2024-08-09 LAB
APICAL FOUR CHAMBER EJECTION FRACTION: 62 %
APICAL TWO CHAMBER EJECTION FRACTION: 64 %
AV INDEX (PROSTH): 0.7
AV MEAN GRADIENT: 3 MMHG
AV PEAK GRADIENT: 5 MMHG
AV VALVE AREA BY VELOCITY RATIO: 2.12 CM²
AV VALVE AREA: 2.31 CM²
AV VELOCITY RATIO: 0.64
BSA FOR ECHO PROCEDURE: 2.06 M2
CV ECHO LV RWT: 0.57 CM
DOP CALC AO PEAK VEL: 1.12 M/S
DOP CALC AO VTI: 22 CM
DOP CALC LVOT AREA: 3.3 CM2
DOP CALC LVOT DIAMETER: 2.05 CM
DOP CALC LVOT PEAK VEL: 0.72 M/S
DOP CALC LVOT STROKE VOLUME: 50.8 CM3
DOP CALC MV VTI: 23.7 CM
DOP CALCLVOT PEAK VEL VTI: 15.4 CM
E WAVE DECELERATION TIME: 189.22 MSEC
E/A RATIO: 1.18
ECHO LV POSTERIOR WALL: 1.21 CM (ref 0.6–1.1)
FRACTIONAL SHORTENING: 36 % (ref 28–44)
HR MV ECHO: 68 BPM
INTERVENTRICULAR SEPTUM: 1.25 CM (ref 0.6–1.1)
IVC DIAMETER: 1.5 CM
LEFT ATRIUM AREA SYSTOLIC (APICAL 2 CHAMBER): 12.2 CM2
LEFT ATRIUM AREA SYSTOLIC (APICAL 4 CHAMBER): 11.81 CM2
LEFT ATRIUM SIZE: 3.99 CM
LEFT ATRIUM VOLUME INDEX MOD: 12.6 ML/M2
LEFT ATRIUM VOLUME MOD: 25.49 CM3
LEFT INTERNAL DIMENSION IN SYSTOLE: 2.73 CM (ref 2.1–4)
LEFT VENTRICLE DIASTOLIC VOLUME INDEX: 39.68 ML/M2
LEFT VENTRICLE DIASTOLIC VOLUME: 80.56 ML
LEFT VENTRICLE END DIASTOLIC VOLUME APICAL 2 CHAMBER: 74.2 ML
LEFT VENTRICLE END DIASTOLIC VOLUME APICAL 4 CHAMBER: 82.43 ML
LEFT VENTRICLE END SYSTOLIC VOLUME APICAL 2 CHAMBER: 27.12 ML
LEFT VENTRICLE END SYSTOLIC VOLUME APICAL 4 CHAMBER: 22.8 ML
LEFT VENTRICLE MASS INDEX: 92 G/M2
LEFT VENTRICLE SYSTOLIC VOLUME INDEX: 13.7 ML/M2
LEFT VENTRICLE SYSTOLIC VOLUME: 27.71 ML
LEFT VENTRICULAR INTERNAL DIMENSION IN DIASTOLE: 4.24 CM (ref 3.5–6)
LEFT VENTRICULAR MASS: 187.42 G
LV LATERAL E/E' RATIO: 9.43 M/S
LVED V (TEICH): 80.56 ML
LVES V (TEICH): 27.71 ML
LVOT MG: 1.21 MMHG
LVOT MV: 0.53 CM/S
MV MEAN GRADIENT: 1 MMHG
MV PEAK A VEL: 0.56 M/S
MV PEAK E VEL: 0.66 M/S
MV PEAK GRADIENT: 2 MMHG
MV VALVE AREA BY CONTINUITY EQUATION: 2.14 CM2
OHS LV EJECTION FRACTION SIMPSONS BIPLANE MOD: 63 %
PISA MRMAX VEL: 3.97 M/S
PISA TR MAX VEL: 2.24 M/S
RA MAJOR: 4.5 CM
RA PRESSURE ESTIMATED: 3 MMHG
RV TB RVSP: 5 MMHG
TDI LATERAL: 0.07 M/S
TR MAX PG: 20 MMHG
TRICUSPID ANNULAR PLANE SYSTOLIC EXCURSION: 2.82 CM
TV REST PULMONARY ARTERY PRESSURE: 23 MMHG
Z-SCORE OF LEFT VENTRICULAR DIMENSION IN END DIASTOLE: -3.5
Z-SCORE OF LEFT VENTRICULAR DIMENSION IN END SYSTOLE: -2.38

## 2024-08-09 PROCEDURE — 70450 CT HEAD/BRAIN W/O DYE: CPT | Mod: TC

## 2024-08-09 PROCEDURE — 93306 TTE W/DOPPLER COMPLETE: CPT

## 2024-08-14 ENCOUNTER — TELEPHONE (OUTPATIENT)
Dept: CARDIOLOGY | Facility: CLINIC | Age: 44
End: 2024-08-14
Payer: MEDICAID

## 2024-08-14 NOTE — TELEPHONE ENCOUNTER
Left voicemail for patient regarding results of echocardiogram.  Advised patient that echocardiogram looks within the normal limits.  We will go over results in great detail at his next office visit, but encouraged him to call clinic if he has any questions before then.    Catrachita Woodard PA-C

## 2024-09-12 NOTE — PROGRESS NOTES
ROSELINE Alonso   [facility]  [address] [address]      PATIENT NAME: Fredi Ricci  : 1980  DATE: 24  MRN: [id number]      Patient PCP Information       Provider PCP Type    ROSELINE Quintanilla [facility]            Reason for Visit / Chief Complaint: Follow-up (Pt stated he is here for lab review and HTN f/u.)       History of Present Illness / Problem Focused Workflow     Fredi Ricci presents to the clinic with Follow-up (Pt stated he is here for lab review and HTN f/u.)     44 y.o. Black or  male presents to the clinic for HTN f/u. PMH HTN, ASHER on CPAP, anxiety and depression, migraines, constipation, MALLORIE, GERD and gastric ulcer (chronic NSAID use ). Followed by [facility] cardio, GI and ENT clinics.     24  Pt presents for HTN follow up and lab review. BP not at goal, pt admits he is not compliant with procardia as prescribed by cardiology due to feeling of lightheadedness and fatigue while taking medication. Educated him on risks of sustained elevated BP and increased losartan to 100mg for BP control and renal protection, f/u in 2 weeks for nurse visit. Lipid boarderline, pt ASCVD risk is 12.5% which is high. Discussed intitiation of statin with pt. Agreeable- repeat FLP in 6-8 weeks. Pt c/o headache, taking fioricet daily. Educated on medication overuse headaches and instructed him to start weaning off and take topamax daily and sumatriptan PRN. Also reviewed possibility that HTN is causing headaches.             Review of Systems     Review of Systems   Constitutional:  Negative for fatigue, fever and unexpected weight change.   HENT:  Negative for ear pain, hearing loss, trouble swallowing and voice change.    Respiratory:  Negative for cough and shortness of breath.    Cardiovascular:  Negative for chest pain and palpitations.   Gastrointestinal:  Negative for abdominal pain, diarrhea  "and vomiting.   Genitourinary:  Negative for dysuria.   Musculoskeletal:  Negative for gait problem.   Skin:  Negative for rash and wound.   Neurological:  Negative for weakness.   Psychiatric/Behavioral:  Negative for suicidal ideas.          Medications and Allergies     Medications  Current Outpatient Medications   Medication Instructions    atorvastatin (LIPITOR) 20 mg, Oral, Nightly    butalbital-acetaminophen-caffeine -40 mg (FIORICET, ESGIC) -40 mg per tablet 1 tablet, Oral, Every 6 hours PRN    ferrous sulfate 325 mg, Oral, Daily    hydrOXYzine pamoate (VISTARIL) 50 mg, Every 6 hours PRN    ibuprofen (ADVIL,MOTRIN) 800 mg, Oral, Every 8 hours PRN    losartan (COZAAR) 100 mg, Oral, Daily    meclizine (ANTIVERT) 25 mg, Oral, 3 times daily PRN    NIFEdipine (PROCARDIA-XL) 90 mg, Oral, Daily    ondansetron (ZOFRAN-ODT) 4 mg, Every 8 hours PRN    pantoprazole (PROTONIX) 40 mg, Oral, Daily    propranoloL (INDERAL) 40 mg, Oral, 2 times daily    sertraline (ZOLOFT) 25 mg, Oral, Nightly    sumatriptan (IMITREX) 25 mg, Oral, Daily PRN, May repeat in 2 hrs x 1 dose if initial dose ineffective    topiramate (TOPAMAX) 50 mg, Oral, 2 times daily         Allergies  Review of patient's allergies indicates:  No Known Allergies    Physical Examination     Visit Vitals  BP (!) 176/120 (BP Location: Right arm, Patient Position: Sitting)   Pulse 80   Temp 97.8 °F (36.6 °C) (Oral)   Resp 18   Ht 5' 9" (1.753 m)   Wt 92.4 kg (203 lb 11.2 oz)   SpO2 98%   BMI 30.08 kg/m²       Physical Exam  Vitals and nursing note reviewed.   Constitutional:       General: He is not in acute distress.     Appearance: He is not ill-appearing.   HENT:      Head: Normocephalic and atraumatic.      Mouth/Throat:      Mouth: Mucous membranes are moist.      Pharynx: Oropharynx is clear.   Eyes:      General: No scleral icterus.     Extraocular Movements: Extraocular movements intact.      Conjunctiva/sclera: Conjunctivae normal.      " Pupils: Pupils are equal, round, and reactive to light.   Neck:      Vascular: No carotid bruit.   Cardiovascular:      Rate and Rhythm: Normal rate and regular rhythm.      Heart sounds: No murmur heard.     No friction rub. No gallop.   Pulmonary:      Effort: Pulmonary effort is normal. No respiratory distress.      Breath sounds: Normal breath sounds. No wheezing, rhonchi or rales.   Abdominal:      General: Abdomen is flat. Bowel sounds are normal. There is no distension.      Palpations: Abdomen is soft. There is no mass.      Tenderness: There is no abdominal tenderness.   Musculoskeletal:         General: Normal range of motion.      Cervical back: Normal range of motion and neck supple.   Skin:     General: Skin is warm and dry.   Neurological:      General: No focal deficit present.      Mental Status: He is alert.   Psychiatric:         Mood and Affect: Mood normal.           Results     Lab Results   Component Value Date    WBC 8.73 08/09/2024    RBC 5.12 08/09/2024    HGB 15.1 08/09/2024    HCT 44.1 08/09/2024    MCV 86.1 08/09/2024    MCH 29.5 08/09/2024    MCHC 34.2 08/09/2024    RDW 12.7 08/09/2024     08/09/2024    MPV 11.8 (H) 08/09/2024    EOS 0.6 (H) 06/20/2022    BASO 0.1 06/20/2022    EOSINOPHIL 7 06/20/2022     CMP  Sodium   Date Value Ref Range Status   08/09/2024 138 136 - 145 mmol/L Final   06/19/2022 140 136 - 145 mmol/L Final     Potassium   Date Value Ref Range Status   08/09/2024 3.9 3.5 - 5.1 mmol/L Final   06/19/2022 3.8 3.5 - 5.1 mmol/L Final     Chloride   Date Value Ref Range Status   08/09/2024 105 98 - 107 mmol/L Final   06/19/2022 105 100 - 109 mmol/L Final     CO2   Date Value Ref Range Status   08/09/2024 26 22 - 29 mmol/L Final     Carbon Dioxide   Date Value Ref Range Status   06/19/2022 25 22 - 33 mmol/L Final     Blood Urea Nitrogen   Date Value Ref Range Status   08/09/2024 12.9 8.9 - 20.6 mg/dL Final   06/19/2022 25 5 - 25 mg/dL Final     Creatinine   Date Value  Ref Range Status   08/09/2024 0.84 0.73 - 1.18 mg/dL Final   06/19/2022 1.72 (H) 0.57 - 1.25 mg/dL Final     Calcium   Date Value Ref Range Status   08/09/2024 9.1 8.4 - 10.2 mg/dL Final   06/19/2022 8.8 8.8 - 10.6 mg/dL Final     Albumin   Date Value Ref Range Status   08/09/2024 4.0 3.5 - 5.0 g/dL Final     Bilirubin Total   Date Value Ref Range Status   08/09/2024 0.4 <=1.5 mg/dL Final     ALP   Date Value Ref Range Status   08/09/2024 68 40 - 150 unit/L Final     AST   Date Value Ref Range Status   08/09/2024 19 5 - 34 unit/L Final     ALT   Date Value Ref Range Status   08/09/2024 44 0 - 55 unit/L Final     Anion Gap   Date Value Ref Range Status   06/19/2022 10 8 - 16 mmol/L Final     eGFR    Date Value Ref Range Status   06/19/2022 51 mL/min/1.73mSq Final     Comment:     In accordance with NKF-ASN Task Force recommendation, calculation based on the Chronic Kidney Disease Epidemiology Collaboration (CKD-EPI) equation without adjustment for race. eGFR adjusted for gender and age and calculated in ml/min/1.73mSquared. eGFR cannot be calculated if patient is under 18 years of age.     Reference Range:   >= 60 ml/min/1.73mSquared.     Estimated GFR-Non    Date Value Ref Range Status   06/24/2021 104 >>=90 mL/min/1.73 m2 Final     Lab Results   Component Value Date    CHOL 234 (H) 08/09/2024     Lab Results   Component Value Date    HDL 36 08/09/2024     Lab Results   Component Value Date    TRIG 290 (H) 08/09/2024     Lab Results   Component Value Date    VLDL 58 08/09/2024     Lab Results   Component Value Date    .00 08/09/2024     Lab Results   Component Value Date    TSH 0.816 08/09/2024         Assessment        ICD-10-CM ICD-9-CM   1. Primary hypertension  I10 401.9   2. Chronic tension-type headache, not intractable  G44.229 339.12   3. Mixed hyperlipidemia  E78.2 272.2   4. Gastroesophageal reflux disease, unspecified whether esophagitis present  K21.9 530.81   5.  Chronic migraine with aura without status migrainosus, not intractable  G43.E09 346.00        Plan      Problem List Items Addressed This Visit          Neuro    Headache (Chronic)    Relevant Medications    topiramate (TOPAMAX) 50 MG tablet    Chronic migraine with aura without status migrainosus, not intractable (Chronic)    Current Assessment & Plan     Topamax BID   Sumatriptan PRN  Wean off fioricet             Cardiac/Vascular    Primary hypertension - Primary (Chronic)    Current Assessment & Plan     Not at goal.  Increase losartan and f/u for nurse visit in 2 weeks  Low Sodium Diet (DASH Diet - Less than 2 grams of sodium per day).  Monitor blood pressure daily and log. Report consistent numbers greater than 140/90.  Maintain healthy weight with goal BMI <30. Exercise 30 minutes per day, 5 days per week.  Smoking cessation encouraged to aid in BP reduction.           Relevant Medications    losartan (COZAAR) 100 MG tablet    propranoloL (INDERAL) 40 MG tablet    Mixed hyperlipidemia    Current Assessment & Plan     Start atorvastatin 20mg  Follow up in 8 weeks with FLP and CMP prior  Stressed importance of dietary modifications. Follow a low cholesterol, low saturated fat diet with less that 200mg of cholesterol a day.  Avoid fried foods and high saturated fats (high saturated fats less than 7% of calories).  Add Flax Seed/Fish Oil supplements to diet. Increase dietary fiber.  Regular exercise can reduce LDL and raise HDL. Stressed importance of physical activity 5 times per week for 30 minutes per day.            Relevant Medications    atorvastatin (LIPITOR) 20 MG tablet    Other Relevant Orders    Comprehensive Metabolic Panel    Lipid Panel       GI    Gastroesophageal reflux disease    Current Assessment & Plan     Protonix daily  Avoid spicy, acidic, fried foods and alcohol.  Eat 2-3 hours before going to bed.  Avoid tight clothing, chew food thoroughly.  Reduce caffeine intake, avoid soda.  Stressed  Detail Level: Generalized Detail Level: Simple importance of Smoking/Tobacco Cessation.           Relevant Medications    pantoprazole (PROTONIX) 40 MG tablet       Future Appointments   Date Time Provider Department Center   10/1/2024  8:30 AM NURSE, [facility] INTERNAL MEDIC[facility]   10/21/2024  9:45 AM Catrachita Woodard PA-C [facility]   11/5/2024  8:40 AM Marcela Ruano FNP [facility]        Follow up in about 2 weeks (around 10/1/2024), or 2nd apt in 8 weeks for HLD follow up, for BP Check, Nurse Visit.      Signature:      [facility]  [address] [id number]    Date of encounter: 9/17/24

## 2024-09-17 ENCOUNTER — OFFICE VISIT (OUTPATIENT)
Dept: INTERNAL MEDICINE | Facility: CLINIC | Age: 44
End: 2024-09-17
Payer: MEDICAID

## 2024-09-17 VITALS
OXYGEN SATURATION: 98 % | HEIGHT: 69 IN | SYSTOLIC BLOOD PRESSURE: 176 MMHG | HEART RATE: 80 BPM | TEMPERATURE: 98 F | WEIGHT: 203.69 LBS | DIASTOLIC BLOOD PRESSURE: 120 MMHG | BODY MASS INDEX: 30.17 KG/M2 | RESPIRATION RATE: 18 BRPM

## 2024-09-17 DIAGNOSIS — E78.2 MIXED HYPERLIPIDEMIA: ICD-10-CM

## 2024-09-17 DIAGNOSIS — G44.229 CHRONIC TENSION-TYPE HEADACHE, NOT INTRACTABLE: Chronic | ICD-10-CM

## 2024-09-17 DIAGNOSIS — I10 PRIMARY HYPERTENSION: Primary | Chronic | ICD-10-CM

## 2024-09-17 DIAGNOSIS — K21.9 GASTROESOPHAGEAL REFLUX DISEASE, UNSPECIFIED WHETHER ESOPHAGITIS PRESENT: ICD-10-CM

## 2024-09-17 DIAGNOSIS — G43.E09 CHRONIC MIGRAINE WITH AURA WITHOUT STATUS MIGRAINOSUS, NOT INTRACTABLE: Chronic | ICD-10-CM

## 2024-09-17 PROBLEM — R07.89 OTHER CHEST PAIN: Status: RESOLVED | Noted: 2023-03-30 | Resolved: 2024-09-17

## 2024-09-17 PROBLEM — E66.3 OVERWEIGHT WITH BODY MASS INDEX (BMI) OF 28 TO 28.9 IN ADULT: Chronic | Status: RESOLVED | Noted: 2023-01-27 | Resolved: 2024-09-17

## 2024-09-17 PROBLEM — R06.02 SHORTNESS OF BREATH: Status: RESOLVED | Noted: 2023-10-16 | Resolved: 2024-09-17

## 2024-09-17 PROBLEM — K25.9 GASTRIC ULCER DUE TO NONSTEROIDAL ANTIINFLAMMATORY DRUG (NSAID) THERAPY: Status: RESOLVED | Noted: 2022-12-19 | Resolved: 2024-09-17

## 2024-09-17 PROBLEM — T39.395A GASTRIC ULCER DUE TO NONSTEROIDAL ANTIINFLAMMATORY DRUG (NSAID) THERAPY: Status: RESOLVED | Noted: 2022-12-19 | Resolved: 2024-09-17

## 2024-09-17 PROCEDURE — 99215 OFFICE O/P EST HI 40 MIN: CPT | Mod: PBBFAC

## 2024-09-17 RX ORDER — IBUPROFEN 800 MG/1
800 TABLET ORAL EVERY 8 HOURS PRN
Qty: 20 TABLET | Refills: 0 | Status: SHIPPED | OUTPATIENT
Start: 2024-09-17

## 2024-09-17 RX ORDER — PANTOPRAZOLE SODIUM 40 MG/1
40 TABLET, DELAYED RELEASE ORAL DAILY
Qty: 30 TABLET | Refills: 3 | Status: SHIPPED | OUTPATIENT
Start: 2024-09-17

## 2024-09-17 RX ORDER — PROPRANOLOL HYDROCHLORIDE 40 MG/1
40 TABLET ORAL 2 TIMES DAILY
Qty: 180 TABLET | Refills: 1 | Status: SHIPPED | OUTPATIENT
Start: 2024-09-17

## 2024-09-17 RX ORDER — LOSARTAN POTASSIUM 100 MG/1
100 TABLET ORAL DAILY
Qty: 90 TABLET | Refills: 2 | Status: SHIPPED | OUTPATIENT
Start: 2024-09-17 | End: 2025-06-14

## 2024-09-17 RX ORDER — ATORVASTATIN CALCIUM 20 MG/1
20 TABLET, FILM COATED ORAL NIGHTLY
Qty: 90 TABLET | Refills: 0 | Status: SHIPPED | OUTPATIENT
Start: 2024-09-17 | End: 2024-12-16

## 2024-09-17 RX ORDER — TOPIRAMATE 50 MG/1
50 TABLET, FILM COATED ORAL 2 TIMES DAILY
Qty: 180 TABLET | Refills: 2 | Status: SHIPPED | OUTPATIENT
Start: 2024-09-17 | End: 2025-06-14

## 2024-09-17 NOTE — ASSESSMENT & PLAN NOTE
Protonix daily  Avoid spicy, acidic, fried foods and alcohol.  Eat 2-3 hours before going to bed.  Avoid tight clothing, chew food thoroughly.  Reduce caffeine intake, avoid soda.  Stressed importance of Smoking/Tobacco Cessation.

## 2024-09-17 NOTE — ASSESSMENT & PLAN NOTE
Not at goal.  Increase losartan and f/u for nurse visit in 2 weeks  Low Sodium Diet (DASH Diet - Less than 2 grams of sodium per day).  Monitor blood pressure daily and log. Report consistent numbers greater than 140/90.  Maintain healthy weight with goal BMI <30. Exercise 30 minutes per day, 5 days per week.  Smoking cessation encouraged to aid in BP reduction.

## 2024-09-17 NOTE — ASSESSMENT & PLAN NOTE
Start atorvastatin 20mg  Follow up in 8 weeks with FLP and CMP prior  Stressed importance of dietary modifications. Follow a low cholesterol, low saturated fat diet with less that 200mg of cholesterol a day.  Avoid fried foods and high saturated fats (high saturated fats less than 7% of calories).  Add Flax Seed/Fish Oil supplements to diet. Increase dietary fiber.  Regular exercise can reduce LDL and raise HDL. Stressed importance of physical activity 5 times per week for 30 minutes per day.

## 2024-09-26 ENCOUNTER — TELEPHONE (OUTPATIENT)
Dept: INTERNAL MEDICINE | Facility: CLINIC | Age: 44
End: 2024-09-26
Payer: MEDICAID

## 2024-10-01 ENCOUNTER — CLINICAL SUPPORT (OUTPATIENT)
Dept: INTERNAL MEDICINE | Facility: CLINIC | Age: 44
End: 2024-10-01
Payer: MEDICAID

## 2024-10-01 VITALS — SYSTOLIC BLOOD PRESSURE: 144 MMHG | DIASTOLIC BLOOD PRESSURE: 93 MMHG | HEART RATE: 60 BPM

## 2024-10-01 DIAGNOSIS — I10 PRIMARY HYPERTENSION: Primary | Chronic | ICD-10-CM

## 2024-10-01 PROCEDURE — 99213 OFFICE O/P EST LOW 20 MIN: CPT | Mod: PBBFAC

## 2024-10-01 NOTE — PROGRESS NOTES
Patient presents to clinic for blood pressure check . Medications reviewed , patient taking; losartan 100 mg daily                 Propanolol 40 mg bid             Nifidepine-not taking                                                                                                                                                                                                                                                                                                                                                                                                                                                As prescribed .   Pateint blood pressure today : 144/93                         Pulse : 60    Instructed to eat low sodium diet, exercise at least 3 x weekly for 30 minutes, drink plenty of water daily .   Routed to mat Ruano np.

## 2024-10-01 NOTE — PROGRESS NOTES
Pt BP elevated and he is unsure if he took his medication today. Reinforced risk of uncontrolled HTN including heart attack, stroke, and death. He reports understanding and will RTC in 2 weeks with BP log.

## 2024-10-01 NOTE — PROGRESS NOTES
Patient presents to clinic for blood pressure check . Medications reviewed , patient taking; losartan 100 mg daily                 Propanolol 40 mg bid             Nifidepine-not taking                                                                                                                                                                                                                                                                                                                                                                                                                                                As prescribed .   Pateint blood pressure today : 144/93                         Pulse : 60    Instructed to eat low sodium diet, exercise at least 3 x weekly for 30 minutes, drink plenty of water daily .

## 2024-10-23 ENCOUNTER — OFFICE VISIT (OUTPATIENT)
Dept: INTERNAL MEDICINE | Facility: CLINIC | Age: 44
End: 2024-10-23
Payer: MEDICAID

## 2024-10-23 VITALS
RESPIRATION RATE: 16 BRPM | SYSTOLIC BLOOD PRESSURE: 136 MMHG | BODY MASS INDEX: 30.2 KG/M2 | TEMPERATURE: 98 F | HEART RATE: 60 BPM | DIASTOLIC BLOOD PRESSURE: 88 MMHG | WEIGHT: 204.5 LBS | OXYGEN SATURATION: 100 %

## 2024-10-23 DIAGNOSIS — Z11.3 SCREENING FOR STDS (SEXUALLY TRANSMITTED DISEASES): ICD-10-CM

## 2024-10-23 DIAGNOSIS — Z11.4 SCREENING FOR HIV (HUMAN IMMUNODEFICIENCY VIRUS): ICD-10-CM

## 2024-10-23 DIAGNOSIS — Z00.00 WELL ADULT EXAM: ICD-10-CM

## 2024-10-23 DIAGNOSIS — I10 PRIMARY HYPERTENSION: Primary | Chronic | ICD-10-CM

## 2024-10-23 DIAGNOSIS — E78.2 MIXED HYPERLIPIDEMIA: ICD-10-CM

## 2024-10-23 DIAGNOSIS — Z12.5 SCREENING FOR MALIGNANT NEOPLASM OF PROSTATE: ICD-10-CM

## 2024-10-23 DIAGNOSIS — Z11.59 NEED FOR HEPATITIS C SCREENING TEST: ICD-10-CM

## 2024-10-23 PROCEDURE — 99214 OFFICE O/P EST MOD 30 MIN: CPT | Mod: S$PBB,,,

## 2024-10-23 PROCEDURE — 3079F DIAST BP 80-89 MM HG: CPT | Mod: CPTII,,,

## 2024-10-23 PROCEDURE — 99214 OFFICE O/P EST MOD 30 MIN: CPT | Mod: PBBFAC

## 2024-10-23 PROCEDURE — 3075F SYST BP GE 130 - 139MM HG: CPT | Mod: CPTII,,,

## 2024-10-23 PROCEDURE — 1160F RVW MEDS BY RX/DR IN RCRD: CPT | Mod: CPTII,,,

## 2024-10-23 PROCEDURE — 1159F MED LIST DOCD IN RCRD: CPT | Mod: CPTII,,,

## 2024-10-23 PROCEDURE — 3066F NEPHROPATHY DOC TX: CPT | Mod: CPTII,,,

## 2024-10-23 PROCEDURE — 3044F HG A1C LEVEL LT 7.0%: CPT | Mod: CPTII,,,

## 2024-10-23 PROCEDURE — 3008F BODY MASS INDEX DOCD: CPT | Mod: CPTII,,,

## 2024-10-23 PROCEDURE — 3061F NEG MICROALBUMINURIA REV: CPT | Mod: CPTII,,,

## 2024-10-23 PROCEDURE — 4010F ACE/ARB THERAPY RXD/TAKEN: CPT | Mod: CPTII,,,

## 2024-10-23 RX ORDER — NIFEDIPINE 90 MG/1
90 TABLET, EXTENDED RELEASE ORAL DAILY
Qty: 90 TABLET | Refills: 2 | Status: SHIPPED | OUTPATIENT
Start: 2024-10-23

## 2024-10-23 RX ORDER — ATORVASTATIN CALCIUM 20 MG/1
20 TABLET, FILM COATED ORAL NIGHTLY
Qty: 90 TABLET | Refills: 2 | Status: SHIPPED | OUTPATIENT
Start: 2024-10-23

## 2024-10-23 NOTE — ASSESSMENT & PLAN NOTE
at goal.  Continue losartan 100mg, nifedipine 90mg, propranolol 40mg  Low Sodium Diet (DASH Diet - Less than 2 grams of sodium per day).  Monitor blood pressure daily and log. Report consistent numbers greater than 140/90.  Maintain healthy weight with goal BMI <30. Exercise 30 minutes per day, 5 days per week.  Smoking cessation encouraged to aid in BP reduction.

## 2024-10-23 NOTE — PROGRESS NOTES
ROSELINE Alonso   [facility]  [address] [facility] [address]      PATIENT NAME: Fredi Ricci  : 1980  DATE: 10/23/24  MRN: [id number]      Patient PCP Information       Provider PCP Type    ROSELINE Alonso General            Reason for Visit / Chief Complaint: Hypertension (Taking medications as prescribed )       History of Present Illness / Problem Focused Workflow     Fredi Ricci presents to the clinic with Hypertension (Taking medications as prescribed )     44 y.o. Black or  male presents to the clinic for HTN f/u. PMH HTN, ASHER on CPAP, anxiety and depression, migraines, constipation, MALLORIE, GERD and gastric ulcer (chronic NSAID use ). Followed by [facility] cardio, GI and ENT clinics.     24  Pt presents for HTN follow up and lab review. BP not at goal, pt admits he is not compliant with procardia as prescribed by cardiology due to feeling of lightheadedness and fatigue while taking medication. Educated him on risks of sustained elevated BP and increased losartan to 100mg for BP control and renal protection, f/u in 2 weeks for nurse visit. Lipid boarderline, pt ASCVD risk is 12.5% which is high. Discussed intitiation of statin with pt. Agreeable- repeat FLP in 6-8 weeks. Pt c/o headache, taking fioricet daily. Educated on medication overuse headaches and instructed him to start weaning off and take topamax daily and sumatriptan PRN. Also reviewed possibility that HTN is causing headaches.     10/23/24  Pt presents today for blood pressure follow up. He is unsure if he has been taking nifedipine. Instructed him to take all medications as prescribed. Reviewed benefits of CCB and ARBs with pt. No acute complaints. He will continue to monitor home BP readings and report any abnormal readings. No acute complaints, declines flu shot. RTC 4 months for wellness with labs          Review of Systems     Review  of Systems   Constitutional:  Negative for fatigue, fever and unexpected weight change.   HENT:  Negative for ear pain, hearing loss, trouble swallowing and voice change.    Respiratory:  Negative for cough and shortness of breath.    Cardiovascular:  Negative for chest pain and palpitations.   Gastrointestinal:  Negative for abdominal pain, diarrhea and vomiting.   Genitourinary:  Negative for dysuria.   Musculoskeletal:  Negative for gait problem.   Skin:  Negative for rash and wound.   Neurological:  Negative for weakness.   Psychiatric/Behavioral:  Negative for suicidal ideas.          Medications and Allergies     Medications  Current Outpatient Medications   Medication Instructions    atorvastatin (LIPITOR) 20 mg, Oral, Nightly    butalbital-acetaminophen-caffeine -40 mg (FIORICET, ESGIC) -40 mg per tablet 1 tablet, Oral, Every 6 hours PRN    ferrous sulfate 325 mg, Oral, Daily    ibuprofen (ADVIL,MOTRIN) 800 mg, Oral, Every 8 hours PRN    losartan (COZAAR) 100 mg, Oral, Daily    meclizine (ANTIVERT) 25 mg, Oral, 3 times daily PRN    NIFEdipine (PROCARDIA-XL) 90 mg, Oral, Daily    pantoprazole (PROTONIX) 40 mg, Oral, Daily    propranoloL (INDERAL) 40 mg, Oral, 2 times daily    topiramate (TOPAMAX) 50 mg, Oral, 2 times daily         Allergies  Review of patient's allergies indicates:  No Known Allergies    Physical Examination     Visit Vitals  /88 (BP Location: Left arm, Patient Position: Sitting)   Pulse 60   Temp 97.8 °F (36.6 °C) (Oral)   Resp 16   Wt 92.8 kg (204 lb 8 oz)   SpO2 100%   BMI 30.20 kg/m²       Physical Exam  Vitals and nursing note reviewed.   Constitutional:       General: He is not in acute distress.     Appearance: He is not ill-appearing.   HENT:      Head: Normocephalic and atraumatic.      Mouth/Throat:      Mouth: Mucous membranes are moist.      Pharynx: Oropharynx is clear.   Eyes:      General: No scleral icterus.     Extraocular Movements: Extraocular movements  intact.      Conjunctiva/sclera: Conjunctivae normal.      Pupils: Pupils are equal, round, and reactive to light.   Neck:      Vascular: No carotid bruit.   Cardiovascular:      Rate and Rhythm: Normal rate and regular rhythm.      Heart sounds: No murmur heard.     No friction rub. No gallop.   Pulmonary:      Effort: Pulmonary effort is normal. No respiratory distress.      Breath sounds: Normal breath sounds. No wheezing, rhonchi or rales.   Abdominal:      General: Abdomen is flat. Bowel sounds are normal. There is no distension.      Palpations: Abdomen is soft. There is no mass.      Tenderness: There is no abdominal tenderness.   Musculoskeletal:         General: Normal range of motion.      Cervical back: Normal range of motion and neck supple.   Skin:     General: Skin is warm and dry.   Neurological:      General: No focal deficit present.      Mental Status: He is alert.   Psychiatric:         Mood and Affect: Mood normal.           Results     Lab Results   Component Value Date    WBC 8.73 08/09/2024    RBC 5.12 08/09/2024    HGB 15.1 08/09/2024    HCT 44.1 08/09/2024    MCV 86.1 08/09/2024    MCH 29.5 08/09/2024    MCHC 34.2 08/09/2024    RDW 12.7 08/09/2024     08/09/2024    MPV 11.8 (H) 08/09/2024    EOS 0.6 (H) 06/20/2022    BASO 0.1 06/20/2022    EOSINOPHIL 7 06/20/2022     CMP  Sodium   Date Value Ref Range Status   08/09/2024 138 136 - 145 mmol/L Final   06/19/2022 140 136 - 145 mmol/L Final     Potassium   Date Value Ref Range Status   08/09/2024 3.9 3.5 - 5.1 mmol/L Final   06/19/2022 3.8 3.5 - 5.1 mmol/L Final     Chloride   Date Value Ref Range Status   08/09/2024 105 98 - 107 mmol/L Final   06/19/2022 105 100 - 109 mmol/L Final     CO2   Date Value Ref Range Status   08/09/2024 26 22 - 29 mmol/L Final     Carbon Dioxide   Date Value Ref Range Status   06/19/2022 25 22 - 33 mmol/L Final     Blood Urea Nitrogen   Date Value Ref Range Status   08/09/2024 12.9 8.9 - 20.6 mg/dL Final    06/19/2022 25 5 - 25 mg/dL Final     Creatinine   Date Value Ref Range Status   08/09/2024 0.84 0.73 - 1.18 mg/dL Final   06/19/2022 1.72 (H) 0.57 - 1.25 mg/dL Final     Calcium   Date Value Ref Range Status   08/09/2024 9.1 8.4 - 10.2 mg/dL Final   06/19/2022 8.8 8.8 - 10.6 mg/dL Final     Albumin   Date Value Ref Range Status   08/09/2024 4.0 3.5 - 5.0 g/dL Final     Bilirubin Total   Date Value Ref Range Status   08/09/2024 0.4 <=1.5 mg/dL Final     ALP   Date Value Ref Range Status   08/09/2024 68 40 - 150 unit/L Final     AST   Date Value Ref Range Status   08/09/2024 19 5 - 34 unit/L Final     ALT   Date Value Ref Range Status   08/09/2024 44 0 - 55 unit/L Final     Anion Gap   Date Value Ref Range Status   06/19/2022 10 8 - 16 mmol/L Final     eGFR    Date Value Ref Range Status   06/19/2022 51 mL/min/1.73mSq Final     Comment:     In accordance with NKF-ASN Task Force recommendation, calculation based on the Chronic Kidney Disease Epidemiology Collaboration (CKD-EPI) equation without adjustment for race. eGFR adjusted for gender and age and calculated in ml/min/1.73mSquared. eGFR cannot be calculated if patient is under 18 years of age.     Reference Range:   >= 60 ml/min/1.73mSquared.     Estimated GFR-Non    Date Value Ref Range Status   06/24/2021 104 >>=90 mL/min/1.73 m2 Final     Lab Results   Component Value Date    CHOL 234 (H) 08/09/2024     Lab Results   Component Value Date    HDL 36 08/09/2024     Lab Results   Component Value Date    TRIG 290 (H) 08/09/2024     Lab Results   Component Value Date    VLDL 58 08/09/2024     Lab Results   Component Value Date    .00 08/09/2024     Lab Results   Component Value Date    TSH 0.816 08/09/2024         Assessment        ICD-10-CM ICD-9-CM   1. Primary hypertension  I10 401.9   2. Need for hepatitis C screening test  Z11.59 V73.89   3. Screening for HIV (human immunodeficiency virus)  Z11.4 V73.89   4. Well adult  exam  Z00.00 V70.0   5. Screening for malignant neoplasm of prostate  Z12.5 V76.44   6. Screening for STDs (sexually transmitted diseases)  Z11.3 V74.5   7. Mixed hyperlipidemia  E78.2 272.2        Plan      Problem List Items Addressed This Visit       Primary hypertension - Primary (Chronic)    Current Assessment & Plan     at goal.  Continue losartan 100mg, nifedipine 90mg, propranolol 40mg  Low Sodium Diet (DASH Diet - Less than 2 grams of sodium per day).  Monitor blood pressure daily and log. Report consistent numbers greater than 140/90.  Maintain healthy weight with goal BMI <30. Exercise 30 minutes per day, 5 days per week.  Smoking cessation encouraged to aid in BP reduction.           Relevant Medications    NIFEdipine (PROCARDIA-XL) 90 MG (OSM) 24 hr tablet    Mixed hyperlipidemia    Relevant Medications    atorvastatin (LIPITOR) 20 MG tablet     Other Visit Diagnoses       Need for hepatitis C screening test        Relevant Orders    Hepatitis Panel, Acute    Screening for HIV (human immunodeficiency virus)        Relevant Orders    HIV 1/2 Ag/Ab (4th Gen)    Well adult exam        Relevant Orders    CBC Auto Differential    Comprehensive Metabolic Panel    Hemoglobin A1C    Lipid Panel    TSH    T4, Free    Vitamin D    Urinalysis, Reflex to Urine Culture    Screening for malignant neoplasm of prostate        Relevant Orders    PSA, Screening    Screening for STDs (sexually transmitted diseases)        Relevant Orders    Chlamydia/GC, PCR    Hepatitis Panel, Acute    SYPHILIS ANTIBODY (WITH REFLEX RPR)            Future Appointments   Date Time Provider Department Center   11/5/2024  8:40 AM Marcela Ruano FNP [facility]   2/24/2025  8:40 AM Marcela Ruano FNP [facility]        Follow up in about 4 months (around 2/23/2025) for Wellness, With labwork prior to visit.      Signature:      [facility]  [address]    [place] [address]    Date of encounter: 10/23/24

## 2024-10-29 ENCOUNTER — HOSPITAL ENCOUNTER (EMERGENCY)
Facility: HOSPITAL | Age: 44
Discharge: HOME | End: 2024-10-30
Attending: SPECIALIST
Payer: MEDICAID

## 2024-10-29 DIAGNOSIS — J02.9 VIRAL PHARYNGITIS: Primary | ICD-10-CM

## 2024-10-29 PROCEDURE — 0240U COVID/FLU A&B PCR: CPT | Performed by: SPECIALIST

## 2024-10-29 PROCEDURE — 87651 STREP A DNA AMP PROBE: CPT | Performed by: SPECIALIST

## 2024-10-29 PROCEDURE — 99283 EMERGENCY DEPT VISIT LOW MDM: CPT

## 2024-10-30 VITALS
BODY MASS INDEX: 28.42 KG/M2 | RESPIRATION RATE: 18 BRPM | WEIGHT: 203 LBS | SYSTOLIC BLOOD PRESSURE: 160 MMHG | OXYGEN SATURATION: 99 % | TEMPERATURE: 98 F | HEART RATE: 85 BPM | DIASTOLIC BLOOD PRESSURE: 107 MMHG | HEIGHT: 71 IN

## 2024-10-30 LAB
FLUAV AG UPPER RESP QL IA.RAPID: NOT DETECTED
FLUBV AG UPPER RESP QL IA.RAPID: NOT DETECTED
SARS-COV-2 RNA RESP QL NAA+PROBE: NOT DETECTED
STREP A PCR (OHS): NOT DETECTED

## 2024-10-30 PROCEDURE — 25000003 PHARM REV CODE 250: Performed by: SPECIALIST

## 2024-10-30 PROCEDURE — 63600175 PHARM REV CODE 636 W HCPCS: Performed by: SPECIALIST

## 2024-10-30 RX ORDER — CHLOPHEDIANOL HCL AND PYRILAMINE MALEATE 12.5; 12.5 MG/5ML; MG/5ML
5 SOLUTION ORAL EVERY 8 HOURS PRN
Qty: 180 ML | Refills: 0 | Status: SHIPPED | OUTPATIENT
Start: 2024-10-30

## 2024-10-30 RX ORDER — PREDNISONE 20 MG/1
20 TABLET ORAL
Status: COMPLETED | OUTPATIENT
Start: 2024-10-30 | End: 2024-10-30

## 2024-10-30 RX ORDER — PREDNISONE 20 MG/1
20 TABLET ORAL 2 TIMES DAILY
Qty: 10 TABLET | Refills: 0 | Status: SHIPPED | OUTPATIENT
Start: 2024-10-30 | End: 2024-11-04

## 2024-10-30 RX ORDER — CLONIDINE HYDROCHLORIDE 0.2 MG/1
0.2 TABLET ORAL
Status: COMPLETED | OUTPATIENT
Start: 2024-10-30 | End: 2024-10-30

## 2024-10-30 RX ADMIN — PREDNISONE 20 MG: 20 TABLET ORAL at 01:10

## 2024-10-30 RX ADMIN — CLONIDINE HYDROCHLORIDE 0.2 MG: 0.2 TABLET ORAL at 12:10

## 2024-10-30 NOTE — ED PROVIDER NOTES
Encounter Date: 10/29/2024       History     Chief Complaint   Patient presents with    Sore Throat     Sore throat and cough x3 days.      44-year-old male with sore throat and a slight cough over the last 3 days; no fever; he also reports not taking his evening blood pressure medication tonight    The history is provided by the patient.     Review of patient's allergies indicates:  No Known Allergies  Past Medical History:   Diagnosis Date    Anemia     Gastric ulcer     Gastric ulcer due to nonsteroidal antiinflammatory drug (NSAID) therapy 12/19/2022    Hypertension      Past Surgical History:   Procedure Laterality Date    COLONOSCOPY  2022    UPPER GASTROINTESTINAL ENDOSCOPY       Family History   Problem Relation Name Age of Onset    Diabetes Father      Heart disease Maternal Grandfather       Social History     Tobacco Use    Smoking status: Never     Passive exposure: Never    Smokeless tobacco: Never   Substance Use Topics    Alcohol use: Yes     Alcohol/week: 14.0 standard drinks of alcohol     Types: 14 Cans of beer per week     Comment: 2 daily    Drug use: Never     Review of Systems   Constitutional: Negative.    HENT: Negative.     Respiratory: Negative.     Cardiovascular: Negative.    Gastrointestinal: Negative.    Genitourinary: Negative.    Musculoskeletal: Negative.    Neurological: Negative.        Physical Exam     Initial Vitals [10/29/24 2338]   BP Pulse Resp Temp SpO2   (!) 187/131 78 18 98.3 °F (36.8 °C) 98 %      MAP       --         Physical Exam    Nursing note and vitals reviewed.  Constitutional: He appears well-developed and well-nourished.   HENT:   Head: Normocephalic and atraumatic.   Posterior oropharynx erythema, no exudate   Eyes: EOM are normal. Pupils are equal, round, and reactive to light.   Neck: Neck supple.   Normal range of motion.  Cardiovascular:  Normal rate, regular rhythm and normal heart sounds.           Pulmonary/Chest: Breath sounds normal. He has no wheezes.    Abdominal: Abdomen is soft. There is no abdominal tenderness.   Musculoskeletal:         General: Normal range of motion.      Cervical back: Normal range of motion and neck supple.     Lymphadenopathy:     He has no cervical adenopathy.   Neurological: He is alert and oriented to person, place, and time.   Skin: Skin is warm and dry.         ED Course   Procedures  Labs Reviewed   COVID/FLU A&B PCR - Normal       Result Value    Influenza A PCR Not Detected      Influenza B PCR Not Detected      SARS-CoV-2 PCR Not Detected      Narrative:     The Xpert Xpress SARS-CoV-2/FLU/RSV plus is a rapid, multiplexed real-time PCR test intended for the simultaneous qualitative detection and differentiation of SARS-CoV-2, Influenza A, Influenza B, and respiratory syncytial virus (RSV) viral RNA in either nasopharyngeal swab or nasal swab specimens.         STREP GROUP A BY PCR - Normal    STREP A PCR ([facility]) Not Detected      Narrative:     The Xpert Xpress Strep A test is a rapid, qualitative in vitro diagnostic test for the detection of Streptococcus pyogenes (Group A ß-hemolytic Streptococcus, Strep A) in throat swab specimens from patients with signs and symptoms of pharyngitis.            Imaging Results    None          Medications   cloNIDine tablet 0.2 mg (0.2 mg Oral Given 10/30/24 0007)   predniSONE tablet 20 mg (20 mg Oral Given 10/30/24 0113)     Medical Decision Making  44-year-old male with sore throat and a slight cough over the last 3 days; no fever; he also reports not taking his evening blood pressure medication tonight    DIFFERENTIAL DIAGNOSIS- viral pharyngitis, strep pharyngitis, COVID, flu    Amount and/or Complexity of Data Reviewed  Labs: ordered. Decision-making details documented in ED Course.    Risk  OTC drugs.  Prescription drug management.  Risk Details: Patient given clonidine 0.2 mg p.o.; blood pressure improved; patient's workup unremarkable and he was given prednisone and a prescription sent  "to his pharmacy       Patient Vitals for the past 24 hrs:   BP Temp Pulse Resp SpO2 Height Weight   10/30/24 0101 (!) 160/107 -- 85 18 99 % -- --   10/29/24 2338 (!) 187/131 98.3 °F (36.8 °C) 78 18 98 % 5' 11" (1.803 m) 92.1 kg (203 lb)                   The patient is resting comfortably and in no acute distress.   He states that his symptoms have improved after treatment in Emergency Department. I personally discussed his test results and treatment plan.  Gave strict ED precautions.  Specific conditions for return to the emergency department and importance of follow up with his primary care provided or the physician listed on the discharge instructions.  Patient voices understanding and agrees to the plan discussed. All patients' questions have been answered at this time.   He has remained hemodynamically stable throughout entire stay in ED and is stable for discharge home.                 Clinical Impression:  Final diagnoses:  [J02.9] Viral pharyngitis (Primary)          ED Disposition Condition    Discharge Stable          ED Prescriptions       Medication Sig Dispense Start Date End Date Auth. Provider    predniSONE (DELTASONE) 20 MG tablet Take 1 tablet (20 mg total) by mouth 2 (two) times daily. for 5 days 10 tablet 10/30/2024 11/4/2024 Avery Collins MD    pyrilamine-chlophedianoL (NINJACOF) 12.5-12.5 mg/5 mL Liqd Take 5 mLs by mouth every 8 (eight) hours as needed (Cough). 180 mL 10/30/2024 -- Avery Collins MD          Follow-up Information       Follow up With Specialties Details Why Contact Info    Marcela Ruano, FNP Internal Medicine In 3 days As needed [address] [facility] [address] [phone]               Avery Collins MD  10/30/24 0194    "

## 2024-11-25 ENCOUNTER — HOSPITAL ENCOUNTER (EMERGENCY)
Facility: HOSPITAL | Age: 44
Discharge: HOME | End: 2024-11-25
Attending: EMERGENCY MEDICINE
Payer: MEDICAID

## 2024-11-25 VITALS
HEART RATE: 106 BPM | BODY MASS INDEX: 28.31 KG/M2 | SYSTOLIC BLOOD PRESSURE: 139 MMHG | WEIGHT: 203 LBS | DIASTOLIC BLOOD PRESSURE: 89 MMHG | RESPIRATION RATE: 20 BRPM | OXYGEN SATURATION: 95 % | TEMPERATURE: 98 F

## 2024-11-25 DIAGNOSIS — J06.9 ACUTE URI: ICD-10-CM

## 2024-11-25 DIAGNOSIS — B34.9 ACUTE VIRAL SYNDROME: Primary | ICD-10-CM

## 2024-11-25 PROCEDURE — 63600175 PHARM REV CODE 636 W HCPCS: Performed by: EMERGENCY MEDICINE

## 2024-11-25 PROCEDURE — 99284 EMERGENCY DEPT VISIT MOD MDM: CPT | Mod: 25

## 2024-11-25 PROCEDURE — 87651 STREP A DNA AMP PROBE: CPT | Performed by: EMERGENCY MEDICINE

## 2024-11-25 PROCEDURE — 96372 THER/PROPH/DIAG INJ SC/IM: CPT | Performed by: EMERGENCY MEDICINE

## 2024-11-25 PROCEDURE — 0240U COVID/FLU A&B PCR: CPT | Performed by: EMERGENCY MEDICINE

## 2024-11-25 RX ORDER — KETOROLAC TROMETHAMINE 30 MG/ML
60 INJECTION, SOLUTION INTRAMUSCULAR; INTRAVENOUS
Status: COMPLETED | OUTPATIENT
Start: 2024-11-25 | End: 2024-11-25

## 2024-11-25 RX ORDER — IBUPROFEN 600 MG/1
600 TABLET ORAL EVERY 6 HOURS PRN
Qty: 20 TABLET | Refills: 0 | Status: SHIPPED | OUTPATIENT
Start: 2024-11-25

## 2024-11-25 RX ORDER — DEXAMETHASONE SODIUM PHOSPHATE 4 MG/ML
8 INJECTION, SOLUTION INTRA-ARTICULAR; INTRALESIONAL; INTRAMUSCULAR; INTRAVENOUS; SOFT TISSUE
Status: COMPLETED | OUTPATIENT
Start: 2024-11-25 | End: 2024-11-25

## 2024-11-25 RX ORDER — BENZONATATE 100 MG/1
100 CAPSULE ORAL 3 TIMES DAILY PRN
Qty: 20 CAPSULE | Refills: 0 | Status: SHIPPED | OUTPATIENT
Start: 2024-11-25 | End: 2024-12-05

## 2024-11-25 RX ADMIN — DEXAMETHASONE SODIUM PHOSPHATE 8 MG: 4 INJECTION, SOLUTION INTRA-ARTICULAR; INTRALESIONAL; INTRAMUSCULAR; INTRAVENOUS; SOFT TISSUE at 10:11

## 2024-11-25 RX ADMIN — KETOROLAC TROMETHAMINE 60 MG: 30 INJECTION, SOLUTION INTRAMUSCULAR at 10:11

## 2024-11-25 NOTE — ED PROVIDER NOTES
Encounter Date: 11/25/2024       History     Chief Complaint   Patient presents with    fever since saturday     Last took tylenol at 1am/ cough also with generalyzed body aches     Patient is a 45-year-old male who presents with a chief complaint of cough, generalized body aches, subjective fever malaise since Saturday.  He denies any nausea or vomiting.  He denies any diarrhea.      Review of patient's allergies indicates:  No Known Allergies  Past Medical History:   Diagnosis Date    Anemia     Gastric ulcer     Gastric ulcer due to nonsteroidal antiinflammatory drug (NSAID) therapy 12/19/2022    Hypertension      Past Surgical History:   Procedure Laterality Date    COLONOSCOPY  2022    UPPER GASTROINTESTINAL ENDOSCOPY       Family History   Problem Relation Name Age of Onset    Diabetes Father      Heart disease Maternal Grandfather       Social History     Tobacco Use    Smoking status: Never     Passive exposure: Never    Smokeless tobacco: Never   Substance Use Topics    Alcohol use: Yes     Alcohol/week: 14.0 standard drinks of alcohol     Types: 14 Cans of beer per week     Comment: 2 daily    Drug use: Never     Review of Systems   Constitutional:  Positive for chills and fatigue. Negative for fever.   HENT:  Positive for rhinorrhea. Negative for congestion, sore throat and trouble swallowing.    Eyes:  Negative for discharge and visual disturbance.   Respiratory:  Positive for cough. Negative for shortness of breath.    Cardiovascular:  Negative for chest pain and palpitations.   Gastrointestinal:  Negative for abdominal pain, diarrhea and vomiting.   Endocrine: Negative.    Genitourinary:  Negative for flank pain and hematuria.   Musculoskeletal:  Positive for myalgias.   Skin:  Negative for rash.   Neurological:  Negative for dizziness and headaches.   Psychiatric/Behavioral:  Negative for hallucinations and suicidal ideas.    All other systems reviewed and are negative.      Physical Exam     Initial  Vitals [11/25/24 0848]   BP Pulse Resp Temp SpO2   139/89 106 20 98.3 °F (36.8 °C) 95 %      MAP       --         Physical Exam    Constitutional: He appears well-developed and well-nourished. No distress.   HENT:   Head: Normocephalic and atraumatic.   Eyes: EOM are normal. Pupils are equal, round, and reactive to light.   Neck: Trachea normal. Neck supple.    Full passive range of motion without pain.     Cardiovascular:  Normal rate, regular rhythm, normal heart sounds and normal pulses.           Pulmonary/Chest: Breath sounds normal.   Abdominal: Abdomen is soft. Bowel sounds are normal. There is no abdominal tenderness.   Musculoskeletal:      Cervical back: Full passive range of motion without pain and neck supple.      Comments: No deformity, Nl ROM     Lymphadenopathy:     He has no cervical adenopathy.   Neurological: He is alert and oriented to person, place, and time. He has normal strength. GCS score is 15. GCS eye subscore is 4. GCS verbal subscore is 5. GCS motor subscore is 6.   Skin: Skin is warm and intact. Capillary refill takes less than 2 seconds.   Psychiatric: He is not actively hallucinating. He expresses no homicidal and no suicidal ideation.         ED Course   Procedures  Labs Reviewed   COVID/FLU A&B PCR - Normal       Result Value    Influenza A PCR Not Detected      Influenza B PCR Not Detected      SARS-CoV-2 PCR Not Detected      Narrative:     The Xpert Xpress SARS-CoV-2/FLU/RSV plus is a rapid, multiplexed real-time PCR test intended for the simultaneous qualitative detection and differentiation of SARS-CoV-2, Influenza A, Influenza B, and respiratory syncytial virus (RSV) viral RNA in either nasopharyngeal swab or nasal swab specimens.         STREP GROUP A BY PCR - Normal    STREP A PCR ([facility]) Not Detected      Narrative:     The Xpert Xpress Strep A test is a rapid, qualitative in vitro diagnostic test for the detection of Streptococcus pyogenes (Group A ß-hemolytic Streptococcus,  Strep A) in throat swab specimens from patients with signs and symptoms of pharyngitis.            Imaging Results    None          Medications   dexAMETHasone injection 8 mg (has no administration in time range)   ketorolac injection 60 mg (has no administration in time range)     Medical Decision Making  The patient presented with flu-like or URI-like symptoms.  There was concern about possible exposure to Influenza virus as well as exposure to and infection with SARS-2-CoV-19.  Influenza infection was ruled out with a negative Influenza swab and Rapid testing in the ER has also ruled out a COVID-19 diagnosis. Other viral etiologies such as rhinovirus, parainfluenza and atypical bacterial infection such as that which occurs with Mycoplasma has not been ruled out but may be present.  Based on my assessment in the ED, I do not suspect any serious respiratory, airway, pulmonary, cardiovascular, metabolic, CNS, medical, or surgical emergency medical condition. I have discussed with the patient and/or caregiver the signs and symptoms of secondary bacterial infections, such as pneumonia.  However, a serious infection may be present in a mild, early form, and the patient may develop a worse infection over the next few days.  Instructions have been given to return to the ED immediately if there are any mental status changes, persistent vomiting, new rash, high fevers, difficulty breathing, or any other change in the patient's condition that concerns them.  I have answered the patient's basic questions and addressed any concerns.  The patient has a good understanding of the treatment plan.  The vital signs have been stable.  The patient's condition is stable and appropriate for discharge from the emergency department, and recommendations for self-quarantine have been communicated until afebrile and the condition has improved.               ED Course as of 11/25/24 0959   Mon Nov 25, 2024   0956 Influenza A, Molecular: Not  Detected [DB]   0956 Influenza B, Molecular: Not Detected [DB]   0956 SARS-CoV2 (COVID-19) Qualitative PCR: Not Detected [DB]   0956 STREP A PCR ([facility]): Not Detected [DB]      ED Course User Index  [DB] Aquilino Moseley MD                           Clinical Impression:  Final diagnoses:  [B34.9] Acute viral syndrome (Primary)  [J06.9] Acute URI          ED Disposition Condition    Discharge Stable          ED Prescriptions       Medication Sig Dispense Start Date End Date Auth. Provider    ibuprofen (ADVIL,MOTRIN) 600 MG tablet Take 1 tablet (600 mg total) by mouth every 6 (six) hours as needed for Pain. 20 tablet 11/25/2024 -- Aquilino Mosleey MD    benzonatate (TESSALON) 100 MG capsule Take 1 capsule (100 mg total) by mouth 3 (three) times daily as needed for Cough. 20 capsule 11/25/2024 12/5/2024 Aquilino Moseley MD          Follow-up Information       Follow up With Specialties Details Why Contact Info    Marcela Ruano, FNP Internal Medicine Go in 1 week If condition has not resolved [address] [facility] [id number] [phone]               Aquilino Moseley MD  11/25/24 1602

## 2025-02-21 ENCOUNTER — RESULTS FOLLOW-UP (OUTPATIENT)
Dept: INTERNAL MEDICINE | Facility: CLINIC | Age: 45
End: 2025-02-21

## 2025-02-21 ENCOUNTER — LAB VISIT (OUTPATIENT)
Dept: LAB | Facility: HOSPITAL | Age: 45
End: 2025-02-21
Payer: MEDICAID

## 2025-02-21 DIAGNOSIS — Z11.59 NEED FOR HEPATITIS C SCREENING TEST: ICD-10-CM

## 2025-02-21 DIAGNOSIS — Z00.00 WELL ADULT EXAM: ICD-10-CM

## 2025-02-21 DIAGNOSIS — Z11.4 SCREENING FOR HIV (HUMAN IMMUNODEFICIENCY VIRUS): ICD-10-CM

## 2025-02-21 DIAGNOSIS — Z11.3 SCREENING FOR STDS (SEXUALLY TRANSMITTED DISEASES): ICD-10-CM

## 2025-02-21 DIAGNOSIS — Z12.5 SCREENING FOR MALIGNANT NEOPLASM OF PROSTATE: ICD-10-CM

## 2025-02-21 LAB
25(OH)D3+25(OH)D2 SERPL-MCNC: 10 NG/ML (ref 30–80)
ALBUMIN SERPL-MCNC: 4.2 G/DL (ref 3.5–5)
ALBUMIN/GLOB SERPL: 1.1 RATIO (ref 1.1–2)
ALP SERPL-CCNC: 62 UNIT/L (ref 40–150)
ALT SERPL-CCNC: 53 UNIT/L (ref 0–55)
ANION GAP SERPL CALC-SCNC: 5 MEQ/L
AST SERPL-CCNC: 28 UNIT/L (ref 5–34)
BACTERIA #/AREA URNS AUTO: ABNORMAL /HPF
BASOPHILS # BLD AUTO: 0.03 X10(3)/MCL
BASOPHILS NFR BLD AUTO: 0.4 %
BILIRUB SERPL-MCNC: 0.3 MG/DL
BILIRUB UR QL STRIP.AUTO: NEGATIVE
BUN SERPL-MCNC: 10.9 MG/DL (ref 8.9–20.6)
C TRACH DNA SPEC QL NAA+PROBE: NOT DETECTED
CALCIUM SERPL-MCNC: 9.2 MG/DL (ref 8.4–10.2)
CHLORIDE SERPL-SCNC: 106 MMOL/L (ref 98–107)
CHOLEST SERPL-MCNC: 201 MG/DL
CHOLEST/HDLC SERPL: 5 {RATIO} (ref 0–5)
CLARITY UR: CLEAR
CO2 SERPL-SCNC: 27 MMOL/L (ref 22–29)
COLOR UR AUTO: ABNORMAL
CREAT SERPL-MCNC: 0.78 MG/DL (ref 0.72–1.25)
CREAT/UREA NIT SERPL: 14
EOSINOPHIL # BLD AUTO: 0.3 X10(3)/MCL (ref 0–0.9)
EOSINOPHIL NFR BLD AUTO: 3.6 %
ERYTHROCYTE [DISTWIDTH] IN BLOOD BY AUTOMATED COUNT: 12.8 % (ref 11.5–17)
EST. AVERAGE GLUCOSE BLD GHB EST-MCNC: 96.8 MG/DL
GFR SERPLBLD CREATININE-BSD FMLA CKD-EPI: >60 ML/MIN/1.73/M2
GLOBULIN SER-MCNC: 3.7 GM/DL (ref 2.4–3.5)
GLUCOSE SERPL-MCNC: 117 MG/DL (ref 74–100)
GLUCOSE UR QL STRIP: NORMAL
HAV IGM SERPL QL IA: NONREACTIVE
HBA1C MFR BLD: 5 %
HBV CORE IGM SERPL QL IA: NONREACTIVE
HBV SURFACE AG SERPL QL IA: NONREACTIVE
HCT VFR BLD AUTO: 45.1 % (ref 42–52)
HCV AB SERPL QL IA: NONREACTIVE
HDLC SERPL-MCNC: 39 MG/DL (ref 35–60)
HGB BLD-MCNC: 15.2 G/DL (ref 14–18)
HGB UR QL STRIP: NEGATIVE
HIV 1+2 AB+HIV1 P24 AG SERPL QL IA: NONREACTIVE
HYALINE CASTS #/AREA URNS LPF: ABNORMAL /LPF
IMM GRANULOCYTES # BLD AUTO: 0.02 X10(3)/MCL (ref 0–0.04)
IMM GRANULOCYTES NFR BLD AUTO: 0.2 %
KETONES UR QL STRIP: NEGATIVE
LDLC SERPL CALC-MCNC: 134 MG/DL (ref 50–140)
LEUKOCYTE ESTERASE UR QL STRIP: NEGATIVE
LYMPHOCYTES # BLD AUTO: 2.79 X10(3)/MCL (ref 0.6–4.6)
LYMPHOCYTES NFR BLD AUTO: 33.7 %
MCH RBC QN AUTO: 29.9 PG (ref 27–31)
MCHC RBC AUTO-ENTMCNC: 33.7 G/DL (ref 33–36)
MCV RBC AUTO: 88.8 FL (ref 80–94)
MONOCYTES # BLD AUTO: 0.4 X10(3)/MCL (ref 0.1–1.3)
MONOCYTES NFR BLD AUTO: 4.8 %
MUCOUS THREADS URNS QL MICRO: ABNORMAL /LPF
N GONORRHOEA DNA SPEC QL NAA+PROBE: NOT DETECTED
NEUTROPHILS # BLD AUTO: 4.74 X10(3)/MCL (ref 2.1–9.2)
NEUTROPHILS NFR BLD AUTO: 57.3 %
NITRITE UR QL STRIP: NEGATIVE
NRBC BLD AUTO-RTO: 0 %
PH UR STRIP: 6 [PH]
PLATELET # BLD AUTO: 287 X10(3)/MCL (ref 130–400)
PMV BLD AUTO: 11 FL (ref 7.4–10.4)
POTASSIUM SERPL-SCNC: 3.9 MMOL/L (ref 3.5–5.1)
PROT SERPL-MCNC: 7.9 GM/DL (ref 6.4–8.3)
PROT UR QL STRIP: ABNORMAL
PSA SERPL-MCNC: 0.72 NG/ML
RBC # BLD AUTO: 5.08 X10(6)/MCL (ref 4.7–6.1)
RBC #/AREA URNS AUTO: ABNORMAL /HPF
SODIUM SERPL-SCNC: 138 MMOL/L (ref 136–145)
SOURCE (OHS): NORMAL
SP GR UR STRIP.AUTO: 1.02 (ref 1–1.03)
SQUAMOUS #/AREA URNS LPF: ABNORMAL /HPF
T PALLIDUM AB SER QL: NONREACTIVE
T4 FREE SERPL-MCNC: 0.78 NG/DL (ref 0.7–1.48)
TRIGL SERPL-MCNC: 142 MG/DL (ref 34–140)
TSH SERPL-ACNC: 1.04 UIU/ML (ref 0.35–4.94)
UROBILINOGEN UR STRIP-ACNC: NORMAL
VLDLC SERPL CALC-MCNC: 28 MG/DL
WBC # BLD AUTO: 8.28 X10(3)/MCL (ref 4.5–11.5)
WBC #/AREA URNS AUTO: ABNORMAL /HPF

## 2025-02-21 PROCEDURE — 85025 COMPLETE CBC W/AUTO DIFF WBC: CPT

## 2025-02-21 PROCEDURE — 81015 MICROSCOPIC EXAM OF URINE: CPT

## 2025-02-21 PROCEDURE — 83036 HEMOGLOBIN GLYCOSYLATED A1C: CPT

## 2025-02-21 PROCEDURE — 80053 COMPREHEN METABOLIC PANEL: CPT

## 2025-02-21 PROCEDURE — 87591 N.GONORRHOEAE DNA AMP PROB: CPT

## 2025-02-21 PROCEDURE — 84443 ASSAY THYROID STIM HORMONE: CPT

## 2025-02-21 PROCEDURE — 80061 LIPID PANEL: CPT

## 2025-02-21 PROCEDURE — 84153 ASSAY OF PSA TOTAL: CPT

## 2025-02-21 PROCEDURE — 82306 VITAMIN D 25 HYDROXY: CPT

## 2025-02-21 PROCEDURE — 36415 COLL VENOUS BLD VENIPUNCTURE: CPT

## 2025-02-21 PROCEDURE — 86780 TREPONEMA PALLIDUM: CPT

## 2025-02-21 PROCEDURE — 87389 HIV-1 AG W/HIV-1&-2 AB AG IA: CPT

## 2025-02-21 PROCEDURE — 84439 ASSAY OF FREE THYROXINE: CPT

## 2025-02-21 PROCEDURE — 80074 ACUTE HEPATITIS PANEL: CPT

## 2025-02-21 NOTE — PROGRESS NOTES
Labs reviewed, will discuss results with patient at their upcoming appointment.     ROSELINE Rutherford

## 2025-02-24 ENCOUNTER — OFFICE VISIT (OUTPATIENT)
Dept: INTERNAL MEDICINE | Facility: CLINIC | Age: 45
End: 2025-02-24
Payer: MEDICAID

## 2025-02-24 VITALS
OXYGEN SATURATION: 100 % | BODY MASS INDEX: 28.3 KG/M2 | HEIGHT: 71 IN | SYSTOLIC BLOOD PRESSURE: 170 MMHG | RESPIRATION RATE: 16 BRPM | HEART RATE: 80 BPM | WEIGHT: 202.13 LBS | DIASTOLIC BLOOD PRESSURE: 100 MMHG | TEMPERATURE: 98 F

## 2025-02-24 DIAGNOSIS — Z23 NEED FOR VACCINATION: ICD-10-CM

## 2025-02-24 DIAGNOSIS — I10 PRIMARY HYPERTENSION: Chronic | ICD-10-CM

## 2025-02-24 DIAGNOSIS — E55.9 VITAMIN D DEFICIENCY: ICD-10-CM

## 2025-02-24 DIAGNOSIS — E78.2 MIXED HYPERLIPIDEMIA: ICD-10-CM

## 2025-02-24 DIAGNOSIS — Z00.00 WELL ADULT EXAM: Primary | ICD-10-CM

## 2025-02-24 DIAGNOSIS — R73.01 IFG (IMPAIRED FASTING GLUCOSE): Chronic | ICD-10-CM

## 2025-02-24 PROCEDURE — 99214 OFFICE O/P EST MOD 30 MIN: CPT | Mod: PBBFAC

## 2025-02-24 PROCEDURE — 90471 IMMUNIZATION ADMIN: CPT | Mod: PBBFAC

## 2025-02-24 PROCEDURE — 90715 TDAP VACCINE 7 YRS/> IM: CPT | Mod: PBBFAC

## 2025-02-24 RX ORDER — ERGOCALCIFEROL 1.25 MG/1
50000 CAPSULE ORAL
Qty: 4 CAPSULE | Refills: 2 | Status: SHIPPED | OUTPATIENT
Start: 2025-02-24

## 2025-02-24 RX ORDER — NIFEDIPINE 60 MG/1
60 TABLET, EXTENDED RELEASE ORAL DAILY
Qty: 30 TABLET | Refills: 6 | Status: SHIPPED | OUTPATIENT
Start: 2025-02-24

## 2025-02-24 RX ADMIN — CLOSTRIDIUM TETANI TOXOID ANTIGEN (FORMALDEHYDE INACTIVATED), CORYNEBACTERIUM DIPHTHERIAE TOXOID ANTIGEN (FORMALDEHYDE INACTIVATED), BORDETELLA PERTUSSIS TOXOID ANTIGEN (GLUTARALDEHYDE INACTIVATED), BORDETELLA PERTUSSIS FILAMENTOUS HEMAGGLUTININ ANTIGEN (FORMALDEHYDE INACTIVATED), BORDETELLA PERTUSSIS PERTACTIN ANTIGEN, AND BORDETELLA PERTUSSIS FIMBRIAE 2/3 ANTIGEN 0.5 ML: 5; 2; 2.5; 5; 3; 5 INJECTION, SUSPENSION INTRAMUSCULAR at 08:02

## 2025-02-24 NOTE — PROGRESS NOTES
Marcela Ruano, ROSELINE   [facility]  [address] [facility] [address]      PATIENT NAME: Fredi Ricci  : 1980  DATE: 25  MRN: [id number]      Reason for Visit / Chief Complaint: Follow-up (Wellness with lab work )       History of Present Illness / Problem Focused Workflow     Fredi Ricci presents to the clinic with Follow-up (Wellness with lab work )     44 y.o. Black or  male presents to the clinic for HTN f/u. PMH HTN, ASHER on CPAP, anxiety and depression, migraines, constipation, MALLORIE, GERD and gastric ulcer (chronic NSAID use ). Followed by [facility] cardio, GI and ENT clinics.     24  Pt presents for HTN follow up and lab review. BP not at goal, pt admits he is not compliant with procardia as prescribed by cardiology due to feeling of lightheadedness and fatigue while taking medication. Educated him on risks of sustained elevated BP and increased losartan to 100mg for BP control and renal protection, f/u in 2 weeks for nurse visit. Lipid boarderline, pt ASCVD risk is 12.5% which is high. Discussed intitiation of statin with pt. Agreeable- repeat FLP in 6-8 weeks. Pt c/o headache, taking fioricet daily. Educated on medication overuse headaches and instructed him to start weaning off and take topamax daily and sumatriptan PRN. Also reviewed possibility that HTN is causing headaches.     10/23/24  Pt presents today for blood pressure follow up. He is unsure if he has been taking nifedipine. Instructed him to take all medications as prescribed. Reviewed benefits of CCB and ARBs with pt. No acute complaints. He will continue to monitor home BP readings and report any abnormal readings. No acute complaints, declines flu shot. RTC 4 months for wellness with labs    25  Pt presents for wellness and lab review. Labs reviewed in detail with pt- agreeable to vitamin D rx followed by OTC. Blood pressure is elevated  significantly, pt is only taking losartan and has not filled nifedipine in months. Reviewed risk of uncontrolled HTN, he is agreeable to resume nifedipine. He was previously prescribed 90mg but will refill at 60mg as to not cause hypotension. RTC 2 weeks for BP check, he is to bring all medication with him to next visit. He would like to take less medication, reviewed low cholesterol and DASH diet and need to exercise in order to require less medication. No acute complaints.           Review of Systems     Review of Systems   Constitutional:  Negative for fatigue, fever and unexpected weight change.   HENT:  Negative for ear pain, hearing loss, trouble swallowing and voice change.    Respiratory:  Negative for cough and shortness of breath.    Cardiovascular:  Negative for chest pain and palpitations.   Gastrointestinal:  Negative for abdominal pain, diarrhea and vomiting.   Genitourinary:  Negative for dysuria.   Musculoskeletal:  Negative for gait problem.   Skin:  Negative for rash and wound.   Neurological:  Negative for weakness.   Psychiatric/Behavioral:  Negative for suicidal ideas.          Medications and Allergies     Medications  Medication List with Changes/Refills   New Medications    ERGOCALCIFEROL (ERGOCALCIFEROL) 50,000 UNIT CAP    Take 1 capsule (50,000 Units total) by mouth every 7 days. For Low Vitamin D. Start over the counter once completed.   Current Medications    ATORVASTATIN (LIPITOR) 20 MG TABLET    Take 1 tablet (20 mg total) by mouth every evening.    BUTALBITAL-ACETAMINOPHEN-CAFFEINE -40 MG (FIORICET, ESGIC) -40 MG PER TABLET    Take 1 tablet by mouth every 6 (six) hours as needed for Headaches.    FERROUS SULFATE 325 (65 FE) MG EC TABLET    Take 1 tablet (325 mg total) by mouth once daily.    IBUPROFEN (ADVIL,MOTRIN) 600 MG TABLET    Take 1 tablet (600 mg total) by mouth every 6 (six) hours as needed for Pain.    LOSARTAN (COZAAR) 100 MG TABLET    Take 1 tablet (100 mg  total) by mouth once daily.    MECLIZINE (ANTIVERT) 25 MG TABLET    Take 1 tablet (25 mg total) by mouth 3 (three) times daily as needed for Dizziness.    PANTOPRAZOLE (PROTONIX) 40 MG TABLET    Take 1 tablet (40 mg total) by mouth once daily.    PROPRANOLOL (INDERAL) 40 MG TABLET    Take 1 tablet (40 mg total) by mouth 2 (two) times daily.    TOPIRAMATE (TOPAMAX) 50 MG TABLET    Take 1 tablet (50 mg total) by mouth 2 (two) times daily.   Changed and/or Refilled Medications    Modified Medication Previous Medication    NIFEDIPINE (PROCARDIA-XL) 60 MG (OSM) 24 HR TABLET NIFEdipine (PROCARDIA-XL) 90 MG (OSM) 24 hr tablet       Take 1 tablet (60 mg total) by mouth once daily.    Take 1 tablet (90 mg total) by mouth once daily.   Discontinued Medications    PYRILAMINE-CHLOPHEDIANOL (NINJACOF) 12.5-12.5 MG/5 ML LIQD    Take 5 mLs by mouth every 8 (eight) hours as needed (Cough).         Allergies  Review of patient's allergies indicates:  No Known Allergies    Physical Examination     Vitals:    02/24/25 0816   BP: (!) 170/100   Pulse:    Resp:    Temp:      Physical Exam  Vitals and nursing note reviewed.   Constitutional:       General: He is not in acute distress.     Appearance: He is not ill-appearing.   HENT:      Head: Normocephalic and atraumatic.      Mouth/Throat:      Mouth: Mucous membranes are moist.      Pharynx: Oropharynx is clear.   Eyes:      General: No scleral icterus.     Extraocular Movements: Extraocular movements intact.      Conjunctiva/sclera: Conjunctivae normal.      Pupils: Pupils are equal, round, and reactive to light.   Neck:      Vascular: No carotid bruit.   Cardiovascular:      Rate and Rhythm: Normal rate and regular rhythm.      Heart sounds: No murmur heard.     No friction rub. No gallop.   Pulmonary:      Effort: Pulmonary effort is normal. No respiratory distress.      Breath sounds: Normal breath sounds. No wheezing, rhonchi or rales.   Abdominal:      General: Abdomen is flat.  Bowel sounds are normal. There is no distension.      Palpations: Abdomen is soft. There is no mass.      Tenderness: There is no abdominal tenderness.   Musculoskeletal:         General: Normal range of motion.      Cervical back: Normal range of motion and neck supple.   Skin:     General: Skin is warm and dry.   Neurological:      General: No focal deficit present.      Mental Status: He is alert.   Psychiatric:         Mood and Affect: Mood normal.           Results     Lab Results   Component Value Date    WBC 8.28 02/21/2025    RBC 5.08 02/21/2025    HGB 15.2 02/21/2025    HCT 45.1 02/21/2025    MCV 88.8 02/21/2025    MCH 29.9 02/21/2025    MCHC 33.7 02/21/2025    RDW 12.8 02/21/2025     02/21/2025    MPV 11.0 (H) 02/21/2025    EOS 0.6 (H) 06/20/2022    BASO 0.1 06/20/2022    EOSINOPHIL 7 06/20/2022     Sodium   Date Value Ref Range Status   02/21/2025 138 136 - 145 mmol/L Final   06/19/2022 140 136 - 145 mmol/L Final     Potassium   Date Value Ref Range Status   02/21/2025 3.9 3.5 - 5.1 mmol/L Final   06/19/2022 3.8 3.5 - 5.1 mmol/L Final     Chloride   Date Value Ref Range Status   02/21/2025 106 98 - 107 mmol/L Final   06/19/2022 105 100 - 109 mmol/L Final     CO2   Date Value Ref Range Status   02/21/2025 27 22 - 29 mmol/L Final     Carbon Dioxide   Date Value Ref Range Status   06/19/2022 25 22 - 33 mmol/L Final     Blood Urea Nitrogen   Date Value Ref Range Status   02/21/2025 10.9 8.9 - 20.6 mg/dL Final   06/19/2022 25 5 - 25 mg/dL Final     Creatinine   Date Value Ref Range Status   02/21/2025 0.78 0.72 - 1.25 mg/dL Final   06/19/2022 1.72 (H) 0.57 - 1.25 mg/dL Final     Calcium   Date Value Ref Range Status   02/21/2025 9.2 8.4 - 10.2 mg/dL Final   06/19/2022 8.8 8.8 - 10.6 mg/dL Final     Albumin   Date Value Ref Range Status   02/21/2025 4.2 3.5 - 5.0 g/dL Final     Bilirubin Total   Date Value Ref Range Status   02/21/2025 0.3 <=1.5 mg/dL Final     ALP   Date Value Ref Range Status    02/21/2025 62 40 - 150 unit/L Final     AST   Date Value Ref Range Status   02/21/2025 28 5 - 34 unit/L Final     ALT   Date Value Ref Range Status   02/21/2025 53 0 - 55 unit/L Final     Anion Gap   Date Value Ref Range Status   06/19/2022 10 8 - 16 mmol/L Final     eGFR    Date Value Ref Range Status   06/19/2022 51 mL/min/1.73mSq Final     Comment:     In accordance with NKF-ASN Task Force recommendation, calculation based on the Chronic Kidney Disease Epidemiology Collaboration (CKD-EPI) equation without adjustment for race. eGFR adjusted for gender and age and calculated in ml/min/1.73mSquared. eGFR cannot be calculated if patient is under 18 years of age.     Reference Range:   >= 60 ml/min/1.73mSquared.     Estimated GFR-Non    Date Value Ref Range Status   06/24/2021 104 >>=90 mL/min/1.73 m2 Final     Lab Results   Component Value Date    CHOL 201 (H) 02/21/2025     Lab Results   Component Value Date    HDL 39 02/21/2025     Lab Results   Component Value Date    TRIG 142 (H) 02/21/2025     Lab Results   Component Value Date    VLDL 28 02/21/2025     Lab Results   Component Value Date    .00 02/21/2025     Lab Results   Component Value Date    TSH 1.045 02/21/2025     Lab Results   Component Value Date    PHUR 6.0 02/21/2025    PROTEINUA Trace (A) 02/21/2025    GLUCUA Normal 02/21/2025    KETONESU Negative 06/21/2021    OCCULTUA Negative 02/21/2025    NITRITE Negative 02/21/2025    LEUKOCYTESUR Negative 02/21/2025     Lab Results   Component Value Date    HGBA1C 5.0 02/21/2025    HGBA1C 5.3 08/09/2024    HGBA1C 5.1 01/27/2023           Assessment         ICD-10-CM ICD-9-CM   1. Well adult exam  Z00.00 V70.0   2. Need for vaccination  Z23 V05.9   3. Primary hypertension  I10 401.9   4. Vitamin D deficiency  E55.9 268.9   5. Mixed hyperlipidemia  E78.2 272.2   6. IFG (impaired fasting glucose)  R73.01 790.21       Plan      Problem List Items Addressed This Visit        Well adult exam - Primary    Current Assessment & Plan   Pt wellness visit completed today with appropriate lab work.   HM Topics Reviewed / Updated  Immunizations Discussed  Dicussed Healthy Diet &   Encouraged to exercise 3 x weekly  Increase Water Intake  Eat more fruits and vegetables  Avoid soda & alcohol           Primary hypertension (Chronic)    Current Assessment & Plan   Elevated   Continue losartan 100mg, resume nifedipine 60mg  Low Sodium Diet (DASH Diet - Less than 2 grams of sodium per day).  Monitor blood pressure daily and log. Report consistent numbers greater than 140/90.  Maintain healthy weight with goal BMI <30. Exercise 30 minutes per day, 5 days per week.  Smoking cessation encouraged to aid in BP reduction.           Relevant Medications    NIFEdipine (PROCARDIA-XL) 60 MG (OSM) 24 hr tablet    IFG (impaired fasting glucose) (Chronic)    Current Assessment & Plan   A1C 5.0  Follow ADA Diet. Avoid soda, simple sweets, and limit rice/pasta/breads/starches (no more than 45-50 grams per meal).  Maintain healthy weight with goal BMI <30.  Exercise 5 times per week for 30 minutes per day.           Mixed hyperlipidemia    Current Assessment & Plan   FLP stable  Cotinue atorvastatin 20mg  Stressed importance of dietary modifications. Follow a low cholesterol, low saturated fat diet with less that 200mg of cholesterol a day.  Avoid fried foods and high saturated fats (high saturated fats less than 7% of calories).  Add Flax Seed/Fish Oil supplements to diet. Increase dietary fiber.  Regular exercise can reduce LDL and raise HDL. Stressed importance of physical activity 5 times per week for 30 minutes per day.            Vitamin D deficiency    Current Assessment & Plan   Educated on increasing foods high in Vitamin D such as fish oil, cod liver oil, salmon, milk fortified with vitamin D.  RX Vitamin D3 42647 IU weekly x 12 weeks.  Complete entire 12 weeks of Vitamin D prescription.  After completion  of prescription (12 weeks/3 months), begin taking Vitamin D 2000 I.U. tablets daily (purchase over the counter).  Repeat Vitamin D level as ordered.    Lab Results   Component Value Date    [id number] 10 (L) 02/21/2025              Relevant Medications    ergocalciferol (ERGOCALCIFEROL) 50,000 unit Cap     Other Visit Diagnoses         Need for vaccination        Relevant Medications    Tdap vaccine injection 0.5 mL (Completed) (Start on 2/24/2025  9:12 AM)            Future Appointments   Date Time Provider Department Center   2/24/2025  8:40 AM Marcela Ruano, MAGDALENEP [facility]            Signature:      [facility]  [address] [facility] [address]    Date of encounter: 2/24/25

## 2025-02-24 NOTE — ASSESSMENT & PLAN NOTE
Elevated   Continue losartan 100mg, resume nifedipine 60mg  Low Sodium Diet (DASH Diet - Less than 2 grams of sodium per day).  Monitor blood pressure daily and log. Report consistent numbers greater than 140/90.  Maintain healthy weight with goal BMI <30. Exercise 30 minutes per day, 5 days per week.  Smoking cessation encouraged to aid in BP reduction.     HISTORY OF PRESENT ILLNESS  Tushar Bauer is a 41 y.o. female here to follow-up. Reported nasal congestion and postnasal drip and cough for past 5 days. She had tested for COVID which came back negative. No fever. Cough is with yellow-green sputum. No shortness of breath or wheezing. She is diabetic, on multiple medications including Trulicity. Did not do lab work for long time. I have advised her is very important to do lab work. Eye checkup up-to-date. On hyperlipidemia, on simvastatin. No myalgia. Need mammogram.  Need colonoscopy. Diabetes    Obesity    Hypertension     Cholesterol Problem    Follow Up Chronic Condition    Cough      Review of Systems   HENT: Negative. Respiratory:  Positive for cough. Physical Exam  Constitutional:       General: She is not in acute distress. Appearance: Normal appearance. She is well-developed. She is obese. HENT:      Right Ear: Tympanic membrane normal.      Left Ear: Tympanic membrane normal.      Nose: Congestion present. Comments: Nasal turbinates:red inflamed,NT    Cobble stoning present. Mouth/Throat:      Mouth: Mucous membranes are moist.   Eyes:      Extraocular Movements: Extraocular movements intact. Conjunctiva/sclera: Conjunctivae normal.      Pupils: Pupils are equal, round, and reactive to light. Neck:      Thyroid: No thyromegaly. Vascular: No JVD. Cardiovascular:      Rate and Rhythm: Normal rate and regular rhythm. Pulses: Normal pulses. Heart sounds: Normal heart sounds. Pulmonary:      Effort: Pulmonary effort is normal. No respiratory distress. Breath sounds: Normal breath sounds. No wheezing or rales. Abdominal:      General: Abdomen is flat. Bowel sounds are normal.      Palpations: Abdomen is soft. Musculoskeletal:         General: No tenderness. Normal range of motion. Cervical back: Normal range of motion and neck supple.       Comments:      Lymphadenopathy: Cervical: No cervical adenopathy. Neurological:      General: No focal deficit present. Mental Status: She is alert and oriented to person, place, and time. Mental status is at baseline. Psychiatric:         Mood and Affect: Mood normal.         Behavior: Behavior normal.       ASSESSMENT and PLAN  Diagnoses and all orders for this visit:    1. Primary hypertension  Stable blood pressure. On amlodipine. Not able to tolerate Diovan hydrochlorothiazide, caused rash. We will check,    -     CBC WITH AUTOMATED DIFF    2. Seasonal allergic rhinitis due to pollen    She is taking Allegra every day. We will also add Flonase.  -     fluticasone propionate (FLONASE) 50 mcg/actuation nasal spray; 2 Sprays by Both Nostrils route daily.  -     CBC WITH AUTOMATED DIFF    3. Type 2 diabetes mellitus without complication, without long-term current use of insulin (HCC)    On multiple medications including metformin, glipizide and Trulicity. Will check,  -     METABOLIC PANEL, COMPREHENSIVE  -     HEMOGLOBIN A1C WITH EAG  -     MICROALBUMIN, UR, RAND W/ MICROALB/CREAT RATIO    4. Elevated lipids    On Zocor. We will check,  -     METABOLIC PANEL, COMPREHENSIVE  -     LIPID PANEL    5. Screen for colon cancer  -     REFERRAL TO GASTROENTEROLOGY    6. Encounter for screening mammogram for malignant neoplasm of breast    We will order,  -     BOOM MAMMO BI SCREENING INCL CAD; Future    7. URTI (acute upper respiratory infection)    Rest and fluids. We will check,  -     doxycycline (ADOXA) 100 mg tablet; Take 1 Tablet by mouth two (2) times a day. Discussed expected course/resolution/complications of diagnosis in detail with patient. Medication risks/benefits/costs/interactions/alternatives discussed with patient. Discussed COVID-19 infection precaution with patient. Pt was given an after visit summary which includes diagnoses, current medications & vitals.    Pt expressed understanding with the diagnosis and plan.

## 2025-02-24 NOTE — ASSESSMENT & PLAN NOTE
A1C 5.0  Follow ADA Diet. Avoid soda, simple sweets, and limit rice/pasta/breads/starches (no more than 45-50 grams per meal).  Maintain healthy weight with goal BMI <30.  Exercise 5 times per week for 30 minutes per day.

## 2025-02-24 NOTE — ASSESSMENT & PLAN NOTE
Educated on increasing foods high in Vitamin D such as fish oil, cod liver oil, salmon, milk fortified with vitamin D.  RX Vitamin D3 16637 IU weekly x 12 weeks.  Complete entire 12 weeks of Vitamin D prescription.  After completion of prescription (12 weeks/3 months), begin taking Vitamin D 2000 I.U. tablets daily (purchase over the counter).  Repeat Vitamin D level as ordered.    Lab Results   Component Value Date    [id number] 10 (L) 02/21/2025

## 2025-02-24 NOTE — ASSESSMENT & PLAN NOTE
FLP stable  Cotinue atorvastatin 20mg  Stressed importance of dietary modifications. Follow a low cholesterol, low saturated fat diet with less that 200mg of cholesterol a day.  Avoid fried foods and high saturated fats (high saturated fats less than 7% of calories).  Add Flax Seed/Fish Oil supplements to diet. Increase dietary fiber.  Regular exercise can reduce LDL and raise HDL. Stressed importance of physical activity 5 times per week for 30 minutes per day.

## 2025-04-08 ENCOUNTER — HOSPITAL ENCOUNTER (EMERGENCY)
Facility: HOSPITAL | Age: 45
Discharge: HOME | End: 2025-04-08
Attending: EMERGENCY MEDICINE
Payer: MEDICAID

## 2025-04-08 VITALS
SYSTOLIC BLOOD PRESSURE: 158 MMHG | WEIGHT: 198.44 LBS | TEMPERATURE: 98 F | BODY MASS INDEX: 28.41 KG/M2 | RESPIRATION RATE: 16 BRPM | HEART RATE: 95 BPM | OXYGEN SATURATION: 97 % | DIASTOLIC BLOOD PRESSURE: 98 MMHG | HEIGHT: 70 IN

## 2025-04-08 DIAGNOSIS — J10.1 INFLUENZA B: Primary | ICD-10-CM

## 2025-04-08 LAB
ALBUMIN SERPL-MCNC: 4.4 G/DL (ref 3.5–5)
ALBUMIN/GLOB SERPL: 1 RATIO (ref 1.1–2)
ALP SERPL-CCNC: 86 UNIT/L (ref 40–150)
ALT SERPL-CCNC: 30 UNIT/L (ref 0–55)
ANION GAP SERPL CALC-SCNC: 5 MEQ/L
AST SERPL-CCNC: 23 UNIT/L (ref 11–45)
BACTERIA #/AREA URNS AUTO: ABNORMAL /HPF
BASOPHILS # BLD AUTO: 0.01 X10(3)/MCL
BASOPHILS NFR BLD AUTO: 0.1 %
BILIRUB SERPL-MCNC: 0.4 MG/DL
BILIRUB UR QL STRIP.AUTO: NEGATIVE
BUN SERPL-MCNC: 8.4 MG/DL (ref 8.9–20.6)
CALCIUM SERPL-MCNC: 9.4 MG/DL (ref 8.4–10.2)
CHLORIDE SERPL-SCNC: 105 MMOL/L (ref 98–107)
CLARITY UR: CLEAR
CO2 SERPL-SCNC: 29 MMOL/L (ref 22–29)
COLOR UR AUTO: COLORLESS
CREAT SERPL-MCNC: 0.87 MG/DL (ref 0.72–1.25)
CREAT/UREA NIT SERPL: 10
EOSINOPHIL # BLD AUTO: 0.06 X10(3)/MCL (ref 0–0.9)
EOSINOPHIL NFR BLD AUTO: 0.7 %
ERYTHROCYTE [DISTWIDTH] IN BLOOD BY AUTOMATED COUNT: 12.7 % (ref 11.5–17)
FLUAV AG UPPER RESP QL IA.RAPID: NOT DETECTED
FLUBV AG UPPER RESP QL IA.RAPID: DETECTED
GFR SERPLBLD CREATININE-BSD FMLA CKD-EPI: >60 ML/MIN/1.73/M2
GLOBULIN SER-MCNC: 4.3 GM/DL (ref 2.4–3.5)
GLUCOSE SERPL-MCNC: 98 MG/DL (ref 74–100)
GLUCOSE UR QL STRIP: NORMAL
HCT VFR BLD AUTO: 46.5 % (ref 42–52)
HGB BLD-MCNC: 15.5 G/DL (ref 14–18)
HGB UR QL STRIP: NEGATIVE
HOLD SPECIMEN: NORMAL
HOLD SPECIMEN: NORMAL
HYALINE CASTS #/AREA URNS LPF: ABNORMAL /LPF
IMM GRANULOCYTES # BLD AUTO: 0.03 X10(3)/MCL (ref 0–0.04)
IMM GRANULOCYTES NFR BLD AUTO: 0.3 %
KETONES UR QL STRIP: NEGATIVE
LEUKOCYTE ESTERASE UR QL STRIP: NEGATIVE
LYMPHOCYTES # BLD AUTO: 1.72 X10(3)/MCL (ref 0.6–4.6)
LYMPHOCYTES NFR BLD AUTO: 19.4 %
MCH RBC QN AUTO: 29.2 PG (ref 27–31)
MCHC RBC AUTO-ENTMCNC: 33.3 G/DL (ref 33–36)
MCV RBC AUTO: 87.7 FL (ref 80–94)
MONOCYTES # BLD AUTO: 0.81 X10(3)/MCL (ref 0.1–1.3)
MONOCYTES NFR BLD AUTO: 9.2 %
NEUTROPHILS # BLD AUTO: 6.22 X10(3)/MCL (ref 2.1–9.2)
NEUTROPHILS NFR BLD AUTO: 70.3 %
NITRITE UR QL STRIP: NEGATIVE
NRBC BLD AUTO-RTO: 0 %
PH UR STRIP: 7 [PH]
PLATELET # BLD AUTO: 215 X10(3)/MCL (ref 130–400)
PMV BLD AUTO: 11.3 FL (ref 7.4–10.4)
POTASSIUM SERPL-SCNC: 4 MMOL/L (ref 3.5–5.1)
PROT SERPL-MCNC: 8.7 GM/DL (ref 6.4–8.3)
PROT UR QL STRIP: ABNORMAL
RBC # BLD AUTO: 5.3 X10(6)/MCL (ref 4.7–6.1)
RBC #/AREA URNS AUTO: ABNORMAL /HPF
SARS-COV-2 RNA RESP QL NAA+PROBE: NOT DETECTED
SODIUM SERPL-SCNC: 139 MMOL/L (ref 136–145)
SP GR UR STRIP.AUTO: 1.01 (ref 1–1.03)
SQUAMOUS #/AREA URNS LPF: ABNORMAL /HPF
UROBILINOGEN UR STRIP-ACNC: NORMAL
WBC # BLD AUTO: 8.85 X10(3)/MCL (ref 4.5–11.5)
WBC #/AREA URNS AUTO: ABNORMAL /HPF

## 2025-04-08 PROCEDURE — 0240U COVID/FLU A&B PCR: CPT | Performed by: PHYSICIAN ASSISTANT

## 2025-04-08 PROCEDURE — 96372 THER/PROPH/DIAG INJ SC/IM: CPT | Performed by: PHYSICIAN ASSISTANT

## 2025-04-08 PROCEDURE — 81015 MICROSCOPIC EXAM OF URINE: CPT | Performed by: PHYSICIAN ASSISTANT

## 2025-04-08 PROCEDURE — 85025 COMPLETE CBC W/AUTO DIFF WBC: CPT | Performed by: PHYSICIAN ASSISTANT

## 2025-04-08 PROCEDURE — 80053 COMPREHEN METABOLIC PANEL: CPT | Performed by: PHYSICIAN ASSISTANT

## 2025-04-08 PROCEDURE — 99284 EMERGENCY DEPT VISIT MOD MDM: CPT | Mod: 25

## 2025-04-08 PROCEDURE — 63600175 PHARM REV CODE 636 W HCPCS: Performed by: PHYSICIAN ASSISTANT

## 2025-04-08 RX ORDER — PROMETHAZINE HYDROCHLORIDE AND DEXTROMETHORPHAN HYDROBROMIDE 6.25; 15 MG/5ML; MG/5ML
5 SYRUP ORAL EVERY 6 HOURS PRN
Qty: 100 ML | Refills: 0 | Status: SHIPPED | OUTPATIENT
Start: 2025-04-08 | End: 2025-04-13

## 2025-04-08 RX ORDER — DEXAMETHASONE SODIUM PHOSPHATE 4 MG/ML
8 INJECTION, SOLUTION INTRA-ARTICULAR; INTRALESIONAL; INTRAMUSCULAR; INTRAVENOUS; SOFT TISSUE
Status: COMPLETED | OUTPATIENT
Start: 2025-04-08 | End: 2025-04-08

## 2025-04-08 RX ADMIN — DEXAMETHASONE SODIUM PHOSPHATE 8 MG: 4 INJECTION, SOLUTION INTRA-ARTICULAR; INTRALESIONAL; INTRAMUSCULAR; INTRAVENOUS; SOFT TISSUE at 10:04

## 2025-04-08 NOTE — Clinical Note
"Fredi Panbabs Ricci was seen and treated in our emergency department on 4/8/2025.  He may return to work on 04/11/2025.       If you have any questions or concerns, please don't hesitate to call.      Isa Bellamy PA-C"

## 2025-04-08 NOTE — ED PROVIDER NOTES
Encounter Date: 4/8/2025       History     Chief Complaint   Patient presents with    Generalized Body Aches     Pt reports generalized body aches and fatigues x 4 days.      Fredi Ricci is a 45 y.o. male with a Pmhx of HTN and gastric ulcer who presents to the ED with complaints of body aches, fatigue, nasal congestion, and cough that started 4 day(s) ago. Reports his son had the flu last week.        The history is provided by the patient. No  was used.     Review of patient's allergies indicates:  No Known Allergies  Past Medical History:   Diagnosis Date    Anemia     Gastric ulcer     Gastric ulcer due to nonsteroidal antiinflammatory drug (NSAID) therapy 12/19/2022    Hypertension      Past Surgical History:   Procedure Laterality Date    COLONOSCOPY  2022    UPPER GASTROINTESTINAL ENDOSCOPY       Family History   Problem Relation Name Age of Onset    Diabetes Father      Heart disease Maternal Grandfather       Social History[1]  Review of Systems   Constitutional:  Positive for chills. Negative for fatigue and fever.   HENT:  Positive for congestion, rhinorrhea and sinus pain. Negative for ear pain and sore throat.    Eyes:  Negative for pain.   Respiratory:  Positive for cough. Negative for chest tightness and shortness of breath.    Cardiovascular:  Negative for chest pain.   Gastrointestinal:  Negative for abdominal pain, constipation, diarrhea, nausea and vomiting.   Genitourinary:  Negative for dysuria.   Musculoskeletal:  Positive for myalgias. Negative for back pain and joint swelling.   Skin:  Negative for color change and rash.   Neurological:  Positive for headaches. Negative for dizziness and weakness.   Psychiatric/Behavioral:  Negative for behavioral problems and confusion.        Physical Exam     Initial Vitals [04/08/25 0928]   BP Pulse Resp Temp SpO2   (!) 152/108 88 16 98.2 °F (36.8 °C) 99 %      MAP       --         Physical Exam    Nursing note and vitals  reviewed.  Constitutional: He appears well-developed and well-nourished.   HENT:   Head: Normocephalic and atraumatic.   Right Ear: External ear normal.   Left Ear: External ear normal.   Nose: Nose normal.   Eyes: Conjunctivae and EOM are normal.   Neck: Neck supple. No thyromegaly present. No tracheal deviation present.   Cardiovascular:  Normal rate, regular rhythm and normal heart sounds.     Exam reveals no gallop and no friction rub.       No murmur heard.  Pulmonary/Chest: Breath sounds normal. No respiratory distress. He has no wheezes. He has no rhonchi. He has no rales. He exhibits no tenderness.   Abdominal: Abdomen is soft. He exhibits no distension.   Musculoskeletal:      Cervical back: Neck supple.     Neurological: He is alert and oriented to person, place, and time. GCS score is 15. GCS eye subscore is 4. GCS verbal subscore is 5. GCS motor subscore is 6.   Skin: Skin is warm. Capillary refill takes less than 2 seconds. No rash and no abscess noted. No erythema. No pallor.   Psychiatric: He has a normal mood and affect.         ED Course   Procedures  Labs Reviewed   COVID/FLU A&B PCR - Abnormal       Result Value    Influenza A PCR Not Detected      Influenza B PCR Detected (*)     SARS-CoV-2 PCR Not Detected      Narrative:     The Xpert Xpress SARS-CoV-2/FLU/RSV plus is a rapid, multiplexed real-time PCR test intended for the simultaneous qualitative detection and differentiation of SARS-CoV-2, Influenza A, Influenza B, and respiratory syncytial virus (RSV) viral RNA in either nasopharyngeal swab or nasal swab specimens.         COMPREHENSIVE METABOLIC PANEL - Abnormal    Sodium 139      Potassium 4.0      Chloride 105      CO2 29      Glucose 98      Blood Urea Nitrogen 8.4 (*)     Creatinine 0.87      Calcium 9.4      Protein Total 8.7 (*)     Albumin 4.4      Globulin 4.3 (*)     Albumin/Globulin Ratio 1.0 (*)     Bilirubin Total 0.4      ALP 86      ALT 30      AST 23      eGFR >60      Anion  Gap 5.0      BUN/Creatinine Ratio 10     URINALYSIS, REFLEX TO URINE CULTURE - Abnormal    Color, UA Colorless (*)     Appearance, UA Clear      Specific Gravity, UA 1.007      pH, UA 7.0      Protein, UA Trace (*)     Glucose, UA Normal      Ketones, UA Negative      Blood, UA Negative      Bilirubin, UA Negative      Urobilinogen, UA Normal      Nitrites, UA Negative      Leukocyte Esterase, UA Negative      RBC, UA 0-5      WBC, UA 0-5      Bacteria, UA None Seen      Squamous Epithelial Cells, UA None Seen      Hyaline Casts, UA None Seen     CBC WITH DIFFERENTIAL - Abnormal    WBC 8.85      RBC 5.30      Hgb 15.5      Hct 46.5      MCV 87.7      MCH 29.2      MCHC 33.3      RDW 12.7      Platelet 215      MPV 11.3 (*)     Neut % 70.3      Lymph % 19.4      Mono % 9.2      Eos % 0.7      Basophil % 0.1      Imm Grans % 0.3      Neut # 6.22      Lymph # 1.72      Mono # 0.81      Eos # 0.06      Baso # 0.01      Imm Gran # 0.03      NRBC% 0.0     CBC W/ AUTO DIFFERENTIAL    Narrative:     The following orders were created for panel order CBC auto differential.  Procedure                               Abnormality         Status                     ---------                               -----------         ------                     CBC with Differential[[id number]]       Abnormal            Final result                 Please view results for these tests on the individual orders.   EXTRA TUBES    Narrative:     The following orders were created for panel order EXTRA TUBES.  Procedure                               Abnormality         Status                     ---------                               -----------         ------                     Light Blue Top Hold[[id number]]                             In process                 Gold Top Hold[[id number]]                                   In process                   Please view results for these tests on the individual orders.   LIGHT BLUE TOP HOLD   GOLD TOP  HOLD          Imaging Results    None          Medications   dexAMETHasone injection 8 mg (has no administration in time range)     Medical Decision Making  DDX: covid, flu, dehydration, electrolyte abnormality, among others    Fredi Ricci is a 45 y.o. male with a Pmhx of HTN and gastric ulcer who presents to the ED with complaints of body aches, fatigue, nasal congestion, and cough that started 4 day(s) ago. Reports his son had the flu last week.      Hospital Course: CBC, CMP, and Viral swabs obtained. CBC and CMP wnl. Flu B+. Out of window for tamiflu. Decadron 8 mg IM given in the ED. Will DC home with Promethazine DM and instructed him to drink plenty of fluids and get plenty of rest. Strict return precautions given. Stable for discharge.     Amount and/or Complexity of Data Reviewed  Labs: ordered. Decision-making details documented in ED Course.    Risk  Prescription drug management.  Risk Details:                  ED Course as of 04/08/25 1050   Tue Apr 08, 2025   1048 Influenza B, Molecular(!): Detected [VH]      ED Course User Index  [VH] Isa Bellamy PA-C                           Clinical Impression:  Final diagnoses:  [J10.1] Influenza B (Primary)          ED Disposition Condition    Discharge Stable          ED Prescriptions       Medication Sig Dispense Start Date End Date Auth. Provider    promethazine-dextromethorphan (PROMETHAZINE-DM) 6.25-15 mg/5 mL Syrp Take 5 mLs by mouth every 6 (six) hours as needed. 100 mL 4/8/2025 4/13/2025 Isa Bellamy PA-C          Follow-up Information       Follow up With Specialties Details Why Contact Info    Marcela Ruano, FNP Internal Medicine   [address] [facility] [id number] [phone]      [facility] Emergency Medicine In 3 days As needed, If symptoms worsen [address] [facility] [place] [address] [phone]               [1]   Social History  Tobacco Use    Smoking status: Never      Passive exposure: Never    Smokeless tobacco: Never   Substance Use Topics    Alcohol use: Yes     Alcohol/week: 14.0 standard drinks of alcohol     Types: 14 Cans of beer per week     Comment: 2 daily    Drug use: Never        Isa Bellamy PA-C  04/08/25 1056

## 2025-04-17 ENCOUNTER — TELEPHONE (OUTPATIENT)
Dept: INTERNAL MEDICINE | Facility: CLINIC | Age: 45
End: 2025-04-17
Payer: MEDICAID

## 2025-04-17 NOTE — TELEPHONE ENCOUNTER
Returned call to pt. Pt stated he, along with is kids all have the flu and he is still running fever, last cheeked last night on 4/16/25 and temp was 101.2 with productive cough. R/s pt's appt for safety precautions.

## 2025-04-17 NOTE — TELEPHONE ENCOUNTER
----- Message from Charly sent at 4/17/2025 10:32 AM CDT -----  .Who Called: Fredi Weems is requesting assistance/information from provider's office.Symptoms (please be specific): n/a How long has patient had these symptoms:  n/aList of preferred pharmacies on file (remove unneeded): [unfilled]If different, enter pharmacy into here including location and phone number: n/aPreferred Method of Contact: Phone CallPatient's Preferred Phone Number on File: [phone] Best Call Back Number, if different:Additional Information: pt called to speak with nurse in regards to upcoming appt, pt states he is just getting over the flu and is asking if he can still come to appt

## 2025-05-29 ENCOUNTER — OFFICE VISIT (OUTPATIENT)
Dept: INTERNAL MEDICINE | Facility: CLINIC | Age: 45
End: 2025-05-29
Payer: MEDICAID

## 2025-05-29 VITALS
RESPIRATION RATE: 16 BRPM | OXYGEN SATURATION: 99 % | WEIGHT: 196 LBS | DIASTOLIC BLOOD PRESSURE: 110 MMHG | SYSTOLIC BLOOD PRESSURE: 170 MMHG | BODY MASS INDEX: 28.06 KG/M2 | HEART RATE: 71 BPM | HEIGHT: 70 IN

## 2025-05-29 DIAGNOSIS — I10 PRIMARY HYPERTENSION: Primary | Chronic | ICD-10-CM

## 2025-05-29 PROCEDURE — 3080F DIAST BP >= 90 MM HG: CPT | Mod: CPTII,,,

## 2025-05-29 PROCEDURE — 4010F ACE/ARB THERAPY RXD/TAKEN: CPT | Mod: CPTII,,,

## 2025-05-29 PROCEDURE — 1160F RVW MEDS BY RX/DR IN RCRD: CPT | Mod: CPTII,,,

## 2025-05-29 PROCEDURE — 3008F BODY MASS INDEX DOCD: CPT | Mod: CPTII,,,

## 2025-05-29 PROCEDURE — 99214 OFFICE O/P EST MOD 30 MIN: CPT | Mod: PBBFAC

## 2025-05-29 PROCEDURE — 3077F SYST BP >= 140 MM HG: CPT | Mod: CPTII,,,

## 2025-05-29 PROCEDURE — 1159F MED LIST DOCD IN RCRD: CPT | Mod: CPTII,,,

## 2025-05-29 PROCEDURE — 3044F HG A1C LEVEL LT 7.0%: CPT | Mod: CPTII,,,

## 2025-05-29 PROCEDURE — 99214 OFFICE O/P EST MOD 30 MIN: CPT | Mod: S$PBB,,,

## 2025-05-29 RX ORDER — AMLODIPINE BESYLATE 10 MG/1
10 TABLET ORAL DAILY
Qty: 90 TABLET | Refills: 2 | Status: SHIPPED | OUTPATIENT
Start: 2025-05-29 | End: 2026-02-23

## 2025-05-29 NOTE — PROGRESS NOTES
Marcela Ruano, ROSELINE   [facility]  [address] [facility] [address]      PATIENT NAME: Fredi Ricci  : 1980  DATE: 25  MRN: [id number]      Reason for Visit / Chief Complaint: Hypertension (Takes medications as prescribed )       History of Present Illness / Problem Focused Workflow     Fredi Ricci presents to the clinic with Hypertension (Takes medications as prescribed )     45 y.o. Black or  male presents to the clinic for HTN f/u. PMH HTN, ASHER on CPAP, anxiety and depression, migraines, constipation, MALLORIE, GERD and gastric ulcer (chronic NSAID use ). Followed by [facility] cardio, GI and ENT clinics.     24  Pt presents for HTN follow up and lab review. BP not at goal, pt admits he is not compliant with procardia as prescribed by cardiology due to feeling of lightheadedness and fatigue while taking medication. Educated him on risks of sustained elevated BP and increased losartan to 100mg for BP control and renal protection, f/u in 2 weeks for nurse visit. Lipid boarderline, pt ASCVD risk is 12.5% which is high. Discussed intitiation of statin with pt. Agreeable- repeat FLP in 6-8 weeks. Pt c/o headache, taking fioricet daily. Educated on medication overuse headaches and instructed him to start weaning off and take topamax daily and sumatriptan PRN. Also reviewed possibility that HTN is causing headaches.     10/23/24  Pt presents today for blood pressure follow up. He is unsure if he has been taking nifedipine. Instructed him to take all medications as prescribed. Reviewed benefits of CCB and ARBs with pt. No acute complaints. He will continue to monitor home BP readings and report any abnormal readings. No acute complaints, declines flu shot. RTC 4 months for wellness with labs    25  Pt presents for wellness and lab review. Labs reviewed in detail with pt- agreeable to vitamin D rx followed by OTC.  Blood pressure is elevated significantly, pt is only taking losartan and has not filled nifedipine in months. Reviewed risk of uncontrolled HTN, he is agreeable to resume nifedipine. He was previously prescribed 90mg but will refill at 60mg as to not cause hypotension. RTC 2 weeks for BP check, he is to bring all medication with him to next visit. He would like to take less medication, reviewed low cholesterol and DASH diet and need to exercise in order to require less medication. No acute complaints.     5/29/25  Pt presents for over due HTN follow up. Blood pressure is very elevated. He reports he is taking all medications but upon reviewing refill records, he reports he is not taking nifedipine due to feeling weird when he took it. He is undergoing a lot of stress with  from long time partner and living in a hotel. Declines counseling/psychiatric discussion. Plan to stop nifedipine, start amlodipine. RTC tomorrow for nurse visit for med rec and follow up for virtual BP log review next week.           Review of Systems     Review of Systems   Constitutional:  Negative for fatigue, fever and unexpected weight change.   HENT:  Negative for ear pain, hearing loss, trouble swallowing and voice change.    Respiratory:  Negative for cough and shortness of breath.    Cardiovascular:  Negative for chest pain and palpitations.   Gastrointestinal:  Negative for abdominal pain, diarrhea and vomiting.   Genitourinary:  Negative for dysuria.   Musculoskeletal:  Negative for gait problem.   Skin:  Negative for rash and wound.   Neurological:  Negative for weakness.   Psychiatric/Behavioral:  Negative for suicidal ideas.          Medications and Allergies     Medications  Medication List with Changes/Refills   New Medications    AMLODIPINE (NORVASC) 10 MG TABLET    Take 1 tablet (10 mg total) by mouth once daily.   Current Medications    ATORVASTATIN (LIPITOR) 20 MG TABLET    Take 1 tablet (20 mg total) by mouth every  evening.    BUTALBITAL-ACETAMINOPHEN-CAFFEINE -40 MG (FIORICET, ESGIC) -40 MG PER TABLET    Take 1 tablet by mouth every 6 (six) hours as needed for Headaches.    ERGOCALCIFEROL (ERGOCALCIFEROL) 50,000 UNIT CAP    Take 1 capsule (50,000 Units total) by mouth every 7 days. For Low Vitamin D. Start over the counter once completed.    FERROUS SULFATE 325 (65 FE) MG EC TABLET    Take 1 tablet (325 mg total) by mouth once daily.    IBUPROFEN (ADVIL,MOTRIN) 600 MG TABLET    Take 1 tablet (600 mg total) by mouth every 6 (six) hours as needed for Pain.    LOSARTAN (COZAAR) 100 MG TABLET    Take 1 tablet (100 mg total) by mouth once daily.    MECLIZINE (ANTIVERT) 25 MG TABLET    Take 1 tablet (25 mg total) by mouth 3 (three) times daily as needed for Dizziness.    PANTOPRAZOLE (PROTONIX) 40 MG TABLET    Take 1 tablet (40 mg total) by mouth once daily.    PROPRANOLOL (INDERAL) 40 MG TABLET    Take 1 tablet (40 mg total) by mouth 2 (two) times daily.    TOPIRAMATE (TOPAMAX) 50 MG TABLET    Take 1 tablet (50 mg total) by mouth 2 (two) times daily.   Discontinued Medications    NIFEDIPINE (PROCARDIA-XL) 60 MG (OSM) 24 HR TABLET    Take 1 tablet (60 mg total) by mouth once daily.         Allergies  Review of patient's allergies indicates:  No Known Allergies    Physical Examination     Vitals:    05/29/25 1237   BP: (!) 170/110   Pulse:    Resp:      Physical Exam  Vitals and nursing note reviewed.   Constitutional:       General: He is not in acute distress.     Appearance: He is not ill-appearing.   HENT:      Head: Normocephalic and atraumatic.      Mouth/Throat:      Mouth: Mucous membranes are moist.      Pharynx: Oropharynx is clear.   Eyes:      General: No scleral icterus.     Extraocular Movements: Extraocular movements intact.      Conjunctiva/sclera: Conjunctivae normal.      Pupils: Pupils are equal, round, and reactive to light.   Neck:      Vascular: No carotid bruit.   Cardiovascular:      Rate and  Rhythm: Normal rate and regular rhythm.      Heart sounds: No murmur heard.     No friction rub. No gallop.   Pulmonary:      Effort: Pulmonary effort is normal. No respiratory distress.      Breath sounds: Normal breath sounds. No wheezing, rhonchi or rales.   Abdominal:      General: Abdomen is flat. Bowel sounds are normal. There is no distension.      Palpations: Abdomen is soft. There is no mass.      Tenderness: There is no abdominal tenderness.   Musculoskeletal:         General: Normal range of motion.      Cervical back: Normal range of motion and neck supple.   Skin:     General: Skin is warm and dry.   Neurological:      General: No focal deficit present.      Mental Status: He is alert.   Psychiatric:         Mood and Affect: Mood normal.           Results     Lab Results   Component Value Date    WBC 8.85 04/08/2025    RBC 5.30 04/08/2025    HGB 15.5 04/08/2025    HCT 46.5 04/08/2025    MCV 87.7 04/08/2025    MCH 29.2 04/08/2025    MCHC 33.3 04/08/2025    RDW 12.7 04/08/2025     04/08/2025    MPV 11.3 (H) 04/08/2025    EOS 0.6 (H) 06/20/2022    BASO 0.1 06/20/2022    EOSINOPHIL 7 06/20/2022     Sodium   Date Value Ref Range Status   04/08/2025 139 136 - 145 mmol/L Final   06/19/2022 140 136 - 145 mmol/L Final     Potassium   Date Value Ref Range Status   04/08/2025 4.0 3.5 - 5.1 mmol/L Final   06/19/2022 3.8 3.5 - 5.1 mmol/L Final     Chloride   Date Value Ref Range Status   04/08/2025 105 98 - 107 mmol/L Final   06/19/2022 105 100 - 109 mmol/L Final     CO2   Date Value Ref Range Status   04/08/2025 29 22 - 29 mmol/L Final     Carbon Dioxide   Date Value Ref Range Status   06/19/2022 25 22 - 33 mmol/L Final     Glucose   Date Value Ref Range Status   04/08/2025 98 74 - 100 mg/dL Final     Blood Urea Nitrogen   Date Value Ref Range Status   04/08/2025 8.4 (L) 8.9 - 20.6 mg/dL Final   06/19/2022 25 5 - 25 mg/dL Final     Creatinine   Date Value Ref Range Status   04/08/2025 0.87 0.72 - 1.25 mg/dL  Final   06/19/2022 1.72 (H) 0.57 - 1.25 mg/dL Final     Calcium   Date Value Ref Range Status   04/08/2025 9.4 8.4 - 10.2 mg/dL Final   06/19/2022 8.8 8.8 - 10.6 mg/dL Final     Protein Total   Date Value Ref Range Status   04/08/2025 8.7 (H) 6.4 - 8.3 gm/dL Final     Albumin   Date Value Ref Range Status   04/08/2025 4.4 3.5 - 5.0 g/dL Final     Bilirubin Total   Date Value Ref Range Status   04/08/2025 0.4 <=1.5 mg/dL Final     ALP   Date Value Ref Range Status   04/08/2025 86 40 - 150 unit/L Final     AST   Date Value Ref Range Status   04/08/2025 23 11 - 45 unit/L Final     ALT   Date Value Ref Range Status   04/08/2025 30 0 - 55 unit/L Final     Anion Gap   Date Value Ref Range Status   06/19/2022 10 8 - 16 mmol/L Final     eGFR    Date Value Ref Range Status   06/19/2022 51 mL/min/1.73mSq Final     Comment:     In accordance with NKF-ASN Task Force recommendation, calculation based on the Chronic Kidney Disease Epidemiology Collaboration (CKD-EPI) equation without adjustment for race. eGFR adjusted for gender and age and calculated in ml/min/1.73mSquared. eGFR cannot be calculated if patient is under 18 years of age.     Reference Range:   >= 60 ml/min/1.73mSquared.     Estimated GFR-Non    Date Value Ref Range Status   06/24/2021 104 >>=90 mL/min/1.73 m2 Final     Lab Results   Component Value Date    CHOL 201 (H) 02/21/2025     Lab Results   Component Value Date    HDL 39 02/21/2025     Lab Results   Component Value Date    TRIG 142 (H) 02/21/2025     Lab Results   Component Value Date    VLDL 28 02/21/2025     Lab Results   Component Value Date    .00 02/21/2025     Lab Results   Component Value Date    TSH 1.045 02/21/2025     Lab Results   Component Value Date    PHUR 7.0 04/08/2025    PROTEINUA Trace (A) 04/08/2025    GLUCUA Normal 04/08/2025    KETONESU Negative 06/21/2021    OCCULTUA Negative 04/08/2025    NITRITE Negative 04/08/2025    LEUKOCYTESUR Negative  04/08/2025     Lab Results   Component Value Date    HGBA1C 5.0 02/21/2025    HGBA1C 5.3 08/09/2024    HGBA1C 5.1 01/27/2023           Assessment         ICD-10-CM ICD-9-CM   1. Primary hypertension  I10 401.9       Plan      Problem List Items Addressed This Visit       Primary hypertension - Primary (Chronic)    Current Assessment & Plan   Elevated   Continue losartan 100mg, propranolol 40mg, start amlodipine  RTC tomorrow for med rec, follow up with BP log review next week  Low Sodium Diet (DASH Diet - Less than 2 grams of sodium per day).  Monitor blood pressure daily and log. Report consistent numbers greater than 140/90.  Maintain healthy weight with goal BMI <30. Exercise 30 minutes per day, 5 days per week.  Smoking cessation encouraged to aid in BP reduction.           Relevant Medications    amLODIPine (NORVASC) 10 MG tablet       Future Appointments   Date Time Provider Department Center   5/29/2025  2:20 PM Marcela Ruano FNP [facility] [place]   5/30/2025 10:00 AM NURSE, [facility] INTERNAL MEDICINE [facility] [place]   6/6/2025  8:20 AM Marcela Ruano FNP [facility] [place]   10/23/2025  1:40 PM Marcela Ruano FNP [facility] [place]            Signature:      [facility]  [address] [facility] [address]    Date of encounter: 5/29/25

## 2025-05-29 NOTE — ASSESSMENT & PLAN NOTE
Elevated   Continue losartan 100mg, propranolol 40mg, start amlodipine  RTC tomorrow for med rec, follow up with BP log review next week  Low Sodium Diet (DASH Diet - Less than 2 grams of sodium per day).  Monitor blood pressure daily and log. Report consistent numbers greater than 140/90.  Maintain healthy weight with goal BMI <30. Exercise 30 minutes per day, 5 days per week.  Smoking cessation encouraged to aid in BP reduction.

## 2025-05-30 ENCOUNTER — CLINICAL SUPPORT (OUTPATIENT)
Dept: INTERNAL MEDICINE | Facility: CLINIC | Age: 45
End: 2025-05-30
Payer: MEDICAID

## 2025-05-30 VITALS — DIASTOLIC BLOOD PRESSURE: 110 MMHG | SYSTOLIC BLOOD PRESSURE: 164 MMHG

## 2025-05-30 DIAGNOSIS — I10 ESSENTIAL HYPERTENSION, BENIGN: ICD-10-CM

## 2025-05-30 DIAGNOSIS — I10 PRIMARY HYPERTENSION: Primary | Chronic | ICD-10-CM

## 2025-05-30 PROCEDURE — 99213 OFFICE O/P EST LOW 20 MIN: CPT | Mod: PBBFAC

## 2025-05-30 NOTE — PROGRESS NOTES
Patient presents to clinic for blood pressure check and med reconciliation . Medications reviewed , patient taking: Losartan as prescribed                                                                                                                                                                                                                                                                                                                                                                                                                                                                                                                                                                                            Pateint blood pressure today :     164/110                     Pulse : 71    Instructed to :  Take medications as prescribed   eat low sodium diet,   exercise at least 3 x weekly for 30 minutes,   drink plenty of water daily .   Note sent to provider ROSELINE Rutherford

## 2025-05-30 NOTE — PROGRESS NOTES
ROSELINE Alonso   [facility]  [address] [facility] [address]      PATIENT NAME: Fredi Ricci  : 1980  DATE: 25  MRN: [id number]      Patient PCP Information       Provider PCP Type    ROSELINE Alonso General            Reason for Visit / Chief Complaint: Appointment (Nurse visit and med reconciliation )       History of Present Illness / Problem Focused Workflow     Fredi Ricci presents to the clinic with Appointment (Nurse visit and med reconciliation )     45 y.o. Black or  male presents to the clinic for HTN f/u. PMH HTN, ASHER on CPAP, anxiety and depression, migraines, constipation, MALLORIE, GERD and gastric ulcer (chronic NSAID use ). Followed by [facility] cardio, GI and ENT clinics.     25  Pt presents for over due HTN follow up. Blood pressure is very elevated. He reports he is taking all medications but upon reviewing refill records, he reports he is not taking nifedipine due to feeling weird when he took it. He is undergoing a lot of stress with  from long time partner and living in a hotel. Declines counseling/psychiatric discussion. Plan to stop nifedipine, start amlodipine. RTC tomorrow for nurse visit for med rec and follow up for virtual BP log review next week.     25  Pt presents for HTN/med rec. Reviewed plan of care and med regimen in detail with pt. He is to  amlodipine and start taking daily, keep BP log over next week and follow up as scheduled virtual next week          Review of Systems     Review of Systems   Constitutional:  Negative for fatigue, fever and unexpected weight change.   HENT:  Negative for ear pain, hearing loss, trouble swallowing and voice change.    Respiratory:  Negative for cough and shortness of breath.    Cardiovascular:  Negative for chest pain and palpitations.   Gastrointestinal:  Negative for abdominal pain, diarrhea and vomiting.    Genitourinary:  Negative for dysuria.   Musculoskeletal:  Negative for gait problem.   Skin:  Negative for rash and wound.   Neurological:  Negative for weakness.   Psychiatric/Behavioral:  Negative for suicidal ideas.          Medications and Allergies     Medications  Current Outpatient Medications   Medication Instructions    amLODIPine (NORVASC) 10 mg, Oral, Daily    atorvastatin (LIPITOR) 20 mg, Oral, Nightly    butalbital-acetaminophen-caffeine -40 mg (FIORICET, ESGIC) -40 mg per tablet 1 tablet, Oral, Every 6 hours PRN    ergocalciferol (ERGOCALCIFEROL) 50,000 Units, Oral, Every 7 days, For Low Vitamin D. Start over the counter once completed.    ferrous sulfate 325 mg, Oral, Daily    ibuprofen (ADVIL,MOTRIN) 600 mg, Oral, Every 6 hours PRN    losartan (COZAAR) 100 mg, Oral, Daily    meclizine (ANTIVERT) 25 mg, Oral, 3 times daily PRN    pantoprazole (PROTONIX) 40 mg, Oral, Daily    propranoloL (INDERAL) 40 mg, Oral, 2 times daily    topiramate (TOPAMAX) 50 mg, Oral, 2 times daily         Allergies  Review of patient's allergies indicates:  No Known Allergies    Physical Examination     Visit Vitals  BP (!) 164/110 (BP Location: Right arm, Patient Position: Sitting)       Physical Exam  Vitals and nursing note reviewed.   Constitutional:       General: He is not in acute distress.     Appearance: He is not ill-appearing.   HENT:      Head: Normocephalic and atraumatic.      Mouth/Throat:      Mouth: Mucous membranes are moist.      Pharynx: Oropharynx is clear.   Eyes:      General: No scleral icterus.     Extraocular Movements: Extraocular movements intact.      Conjunctiva/sclera: Conjunctivae normal.      Pupils: Pupils are equal, round, and reactive to light.   Neck:      Vascular: No carotid bruit.   Cardiovascular:      Rate and Rhythm: Normal rate and regular rhythm.      Heart sounds: No murmur heard.     No friction rub. No gallop.   Pulmonary:      Effort: Pulmonary effort is normal.  No respiratory distress.      Breath sounds: Normal breath sounds. No wheezing, rhonchi or rales.   Abdominal:      General: Abdomen is flat. Bowel sounds are normal. There is no distension.      Palpations: Abdomen is soft. There is no mass.      Tenderness: There is no abdominal tenderness.   Musculoskeletal:         General: Normal range of motion.      Cervical back: Normal range of motion and neck supple.   Skin:     General: Skin is warm and dry.   Neurological:      General: No focal deficit present.      Mental Status: He is alert.   Psychiatric:         Mood and Affect: Mood normal.           Results     Lab Results   Component Value Date    WBC 8.85 04/08/2025    RBC 5.30 04/08/2025    HGB 15.5 04/08/2025    HCT 46.5 04/08/2025    MCV 87.7 04/08/2025    MCH 29.2 04/08/2025    MCHC 33.3 04/08/2025    RDW 12.7 04/08/2025     04/08/2025    MPV 11.3 (H) 04/08/2025    EOS 0.6 (H) 06/20/2022    BASO 0.1 06/20/2022    EOSINOPHIL 7 06/20/2022     CMP  Sodium   Date Value Ref Range Status   04/08/2025 139 136 - 145 mmol/L Final   06/19/2022 140 136 - 145 mmol/L Final     Potassium   Date Value Ref Range Status   04/08/2025 4.0 3.5 - 5.1 mmol/L Final   06/19/2022 3.8 3.5 - 5.1 mmol/L Final     Chloride   Date Value Ref Range Status   04/08/2025 105 98 - 107 mmol/L Final   06/19/2022 105 100 - 109 mmol/L Final     CO2   Date Value Ref Range Status   04/08/2025 29 22 - 29 mmol/L Final     Carbon Dioxide   Date Value Ref Range Status   06/19/2022 25 22 - 33 mmol/L Final     Glucose   Date Value Ref Range Status   04/08/2025 98 74 - 100 mg/dL Final     Blood Urea Nitrogen   Date Value Ref Range Status   04/08/2025 8.4 (L) 8.9 - 20.6 mg/dL Final   06/19/2022 25 5 - 25 mg/dL Final     Creatinine   Date Value Ref Range Status   04/08/2025 0.87 0.72 - 1.25 mg/dL Final   06/19/2022 1.72 (H) 0.57 - 1.25 mg/dL Final     Calcium   Date Value Ref Range Status   04/08/2025 9.4 8.4 - 10.2 mg/dL Final   06/19/2022 8.8 8.8 -  10.6 mg/dL Final     Protein Total   Date Value Ref Range Status   04/08/2025 8.7 (H) 6.4 - 8.3 gm/dL Final     Albumin   Date Value Ref Range Status   04/08/2025 4.4 3.5 - 5.0 g/dL Final     Bilirubin Total   Date Value Ref Range Status   04/08/2025 0.4 <=1.5 mg/dL Final     ALP   Date Value Ref Range Status   04/08/2025 86 40 - 150 unit/L Final     AST   Date Value Ref Range Status   04/08/2025 23 11 - 45 unit/L Final     ALT   Date Value Ref Range Status   04/08/2025 30 0 - 55 unit/L Final     Anion Gap   Date Value Ref Range Status   06/19/2022 10 8 - 16 mmol/L Final     eGFR    Date Value Ref Range Status   06/19/2022 51 mL/min/1.73mSq Final     Comment:     In accordance with NKF-ASN Task Force recommendation, calculation based on the Chronic Kidney Disease Epidemiology Collaboration (CKD-EPI) equation without adjustment for race. eGFR adjusted for gender and age and calculated in ml/min/1.73mSquared. eGFR cannot be calculated if patient is under 18 years of age.     Reference Range:   >= 60 ml/min/1.73mSquared.     Estimated GFR-Non    Date Value Ref Range Status   06/24/2021 104 >>=90 mL/min/1.73 m2 Final     Lab Results   Component Value Date    CHOL 201 (H) 02/21/2025     Lab Results   Component Value Date    HDL 39 02/21/2025     Lab Results   Component Value Date    TRIG 142 (H) 02/21/2025     Lab Results   Component Value Date    VLDL 28 02/21/2025     Lab Results   Component Value Date    .00 02/21/2025     Lab Results   Component Value Date    TSH 1.045 02/21/2025         Assessment        ICD-10-CM ICD-9-CM   1. Primary hypertension  I10 401.9        Plan      Problem List Items Addressed This Visit       Primary hypertension - Primary (Chronic)    Current Assessment & Plan   Elevated- med rec performed and reviewed extensively with pt  Continue losartan 100mg, propranolol 40mg, start amlodipine  Monitor BP daily and keep log, follow up for BP log next week  virtual  Low Sodium Diet (DASH Diet - Less than 2 grams of sodium per day).  Monitor blood pressure daily and log. Report consistent numbers greater than 140/90.  Maintain healthy weight with goal BMI <30. Exercise 30 minutes per day, 5 days per week.  Smoking cessation encouraged to aid in BP reduction.              Future Appointments   Date Time Provider Department Center   6/6/2025  8:20 AM Marcela Ruano FNP [facility]   10/23/2025  1:40 PM Marcela Ruano FNP [facility]        No follow-ups on file.      Signature:      [facility]  [address] [facility] [address]    Date of encounter: 5/30/25

## 2025-05-30 NOTE — ASSESSMENT & PLAN NOTE
Elevated- med rec performed and reviewed extensively with pt  Continue losartan 100mg, propranolol 40mg, start amlodipine  Monitor BP daily and keep log, follow up for BP log next week virtual  Low Sodium Diet (DASH Diet - Less than 2 grams of sodium per day).  Monitor blood pressure daily and log. Report consistent numbers greater than 140/90.  Maintain healthy weight with goal BMI <30. Exercise 30 minutes per day, 5 days per week.  Smoking cessation encouraged to aid in BP reduction.

## 2025-06-12 ENCOUNTER — TELEPHONE (OUTPATIENT)
Dept: INTERNAL MEDICINE | Facility: CLINIC | Age: 45
End: 2025-06-12
Payer: MEDICAID

## 2025-06-12 NOTE — TELEPHONE ENCOUNTER
Please let her know PCP is out. I would recommend ER please for any acute concerns. Advise can call [facility] and/or walk in for an appointment.     Thank you

## 2025-06-12 NOTE — TELEPHONE ENCOUNTER
Pt sister called concerned about pt anxiety/blood pressure. She lives out of town and is currently going to check on him because she knows something is off. She is asking if their is something that can be prescribed for anxiety or if their is somewhere he can go. And if an appt is needed she ensure that he comes to the appt.

## 2025-06-12 NOTE — TELEPHONE ENCOUNTER
Spoke with pt sister and advised she take him to the er or do a walk in visit to [facility]. She verbalized understanding.